# Patient Record
Sex: FEMALE | Race: WHITE | NOT HISPANIC OR LATINO | Employment: PART TIME | URBAN - METROPOLITAN AREA
[De-identification: names, ages, dates, MRNs, and addresses within clinical notes are randomized per-mention and may not be internally consistent; named-entity substitution may affect disease eponyms.]

---
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
## 2023-01-11 ENCOUNTER — VIRTUAL VISIT (OUTPATIENT)
Dept: BEHAVIORAL HEALTH | Facility: CLINIC | Age: 18
End: 2023-01-11
Payer: COMMERCIAL

## 2023-01-11 DIAGNOSIS — F41.9 ANXIETY DISORDER, UNSPECIFIED TYPE: Primary | ICD-10-CM

## 2023-01-11 DIAGNOSIS — F50.819 BINGE EATING DISORDER: ICD-10-CM

## 2023-01-11 DIAGNOSIS — F33.40 RECURRENT MAJOR DEPRESSIVE DISORDER, IN REMISSION (H): ICD-10-CM

## 2023-01-11 PROCEDURE — 90834 PSYTX W PT 45 MINUTES: CPT | Mod: GT | Performed by: SOCIAL WORKER

## 2023-01-11 NOTE — PROGRESS NOTES
[facility] Primary Care: Integrated Behavioral Health  January 11, 2023        Behavioral Health Clinician Progress Note    Patient Name: Mirna Carl           Service Type:  Individual      Service Location:   Face to Face in Clinic     Session Start Time: 12:31p  Session End Time: 1:15p      Session Length: 38 - 52      Attendees: Client     Service Modality:  Video Visit:      Provider verified identity through the following two step process.  Patient provided:  Patient is known previously to provider    Telemedicine Visit: The patient's condition can be safely assessed and treated via synchronous audio and visual telemedicine encounter.      Reason for Telemedicine Visit: Patient has requested telehealth visit    Originating Site (Patient Location): Patient's home    Distant Site (Provider Location): Provider Remote Setting- Home Office    Consent:  The patient/guardian has verbally consented to: the potential risks and benefits of telemedicine (video visit) versus in person care; bill my insurance or make self-payment for services provided; and responsibility for payment of non-covered services.     Patient would like the video invitation sent by:  My Chart    Mode of Communication:  Video Conference via [employer]well    Distant Location (Provider):  Off-site    As the provider I attest to compliance with applicable laws and regulations related to telemedicine.    Visit Activities (Refresh list every visit): [facility] Only    Diagnostic Assessment Date: 12/14/22  Treatment Plan Review Date: 4/11/23  See Flowsheets for today's PHQ-9 and MATT-7 results  Previous PHQ-9:   PHQ-9 SCORE 10/28/2022 11/29/2022 12/14/2022   PHQ-9 Total Score MyChart - 0 0   PHQ-9 Total Score 13 0 0     Previous MATT-7:   MATT-7 SCORE 10/28/2022 12/14/2022   Total Score - 0 (minimal anxiety)   Total Score 13 0       NIRAV LEVEL:  No flowsheet data found.    DATA  Extended Session (60+ minutes): No  Interactive  Complexity: No  Crisis: No  [facility] Patient: No    Treatment Objective(s) Addressed in This Session:  Target Behavior(s): anxiety    Anxiety: will experience a reduction in anxiety    Current Stressors / Issues:  Is on J term currently and starts her next semester in 2 weeks.    Is working quite a bit over break.  Finances are a bit tight with spring semester coming next month.  Has worked through a budget plan and is feeling better and has financial support from family if needed.  Had interview for the summer position and reports that it went really well although it came with some situational anxiety.   Likes being on vyvanse and feels it has been helpful with curbing her appetite.  Has been using the [employer] equipment after her work shifts as she prefers working out without so many people.          Progress on Treatment Objective(s) / Homework:  Stable - ACTION (Actively working towards change); Intervened by reinforcing change plan / affirming steps taken    Motivational Interviewing    MI Intervention: Expressed Empathy/Understanding, Permission to raise concern or advise, Open-ended questions and Reflections: simple and complex     Change Talk Expressed by the Patient: Desire to change Committment to change    Provider Response to Change Talk: E - Evoked more info from patient about behavior change, A - Affirmed patient's thoughts, decisions, or attempts at behavior change, R - Reflected patient's change talk and S - Summarized patient's change talk statements      Care Plan review completed: No    Medication Review:  Changes to psychiatric medications, see updated Medication List in EPIC.  Recently started back on vyvanse.     Medication Compliance:  Yes    Changes in Health Issues:   None reported    Chemical Use Review:   Substance Use: Chemical use reviewed, no active concerns identified      Tobacco Use: No current tobacco use.      Assessment: Current Emotional / Mental Status (status of significant  symptoms):  Risk status (Self / Other harm or suicidal ideation)  Patient denies a history of suicidal ideation, suicide attempts, self-injurious behavior, homicidal ideation, homicidal behavior and and other safety concerns  Patient denies current fears or concerns for personal safety.  Patient denies current or recent suicidal ideation or behaviors.  Patient denies current or recent homicidal ideation or behaviors.  Patient denies current or recent self injurious behavior or ideation.  Patient denies other safety concerns.  A safety and risk management plan has not been developed at this time, however patient was encouraged to call [place] [phone] should there be a change in any of these risk factors.    Appearance:   Appropriate   Eye Contact:   Good   Psychomotor Behavior: Normal   Attitude:   Cooperative   Orientation:   All  Speech   Rate / Production: Normal/ Responsive Normal    Volume:  Normal   Mood:    Normal  Affect:    Appropriate   Thought Content:  Clear   Thought Form:  Coherent  Logical   Insight:    Good     Diagnoses:  1. Anxiety disorder, unspecified type    2. Recurrent major depressive disorder, in remission (H)    3. Binge eating disorder        Collateral Reports Completed:  Not Applicable    Plan: (Homework, other):  Patient was given information about behavioral services and encouraged to schedule a follow up appointment with the clinic [facility] in 3 weeks.  She was also given information about mental health symptoms and treatment options .  CD Recommendations: No indications of CD issues.  Ligia Garcia, [facility], [facility]                                              Individual Treatment Plan    Patient's Name: Mirna Carl  YOB: 2005    Date of Creation: 1/11/23   Date Treatment Plan Last Reviewed/Revised: 1/11/23     DSM5 Diagnoses: 296.30 (F33.9) Major Depressive Disorder, Recurrent Episode, Unspecified _ and With anxious distress or 300.00 (F41.9) Unspecified Anxiety  Disorder  Psychosocial / Contextual Factors: school stress,  PROMIS (reviewed every 90 days): 40    Referral / Collaboration:  Referral to another professional/service is not indicated at this time..    Anticipated number of session for this episode of care: 6-9 sessions  Anticipation frequency of session: Monthly  Anticipated Duration of each session: 38-52 minutes  Treatment plan will be reviewed in 90 days or when goals have been changed.       MeasurableTreatment Goal(s) related to diagnosis / functional impairment(s)  Goal 1: Patient will experience a reduction in anxious symptoms, along with a corresponding increase in relaxed emotional state.    I will know I've met my goal when anxious worries/fears decrease.      Objective #A (Patient Action)    Patient will use cognitive strategies identified in therapy to challenge anxious thoughts.  Status: New - Date: 1/11/23     Intervention(s)  Therapist will utilize CBT and ACT to help pt challenge anxious thoughts and reduce intensity/duration of anxious distress.      Patient has reviewed and agreed to the above plan.        Ligia Garcia, ESVIN, LICSW, [facility]  January 11, 2023

## 2023-02-01 ENCOUNTER — VIRTUAL VISIT (OUTPATIENT)
Dept: BEHAVIORAL HEALTH | Facility: CLINIC | Age: 18
End: 2023-02-01
Payer: COMMERCIAL

## 2023-02-01 DIAGNOSIS — F33.40 RECURRENT MAJOR DEPRESSIVE DISORDER, IN REMISSION (H): ICD-10-CM

## 2023-02-01 DIAGNOSIS — F50.819 BINGE EATING DISORDER: ICD-10-CM

## 2023-02-01 DIAGNOSIS — F41.9 ANXIETY DISORDER, UNSPECIFIED TYPE: Primary | ICD-10-CM

## 2023-02-01 PROCEDURE — 90834 PSYTX W PT 45 MINUTES: CPT | Mod: GT | Performed by: SOCIAL WORKER

## 2023-02-01 ASSESSMENT — PATIENT HEALTH QUESTIONNAIRE - PHQ9
SUM OF ALL RESPONSES TO PHQ QUESTIONS 1-9: 0
10. IF YOU CHECKED OFF ANY PROBLEMS, HOW DIFFICULT HAVE THESE PROBLEMS MADE IT FOR YOU TO DO YOUR WORK, TAKE CARE OF THINGS AT HOME, OR GET ALONG WITH OTHER PEOPLE: NOT DIFFICULT AT ALL
SUM OF ALL RESPONSES TO PHQ QUESTIONS 1-9: 0

## 2023-02-01 NOTE — PROGRESS NOTES
[facility] Primary Care: Integrated Behavioral Health  February 1, 2023      Behavioral Health Clinician Progress Note    Patient Name: Mirna Carl           Service Type:  Individual      Service Location:   Face to Face in Clinic     Session Start Time: 12:31p  Session End Time: 1:15p      Session Length: 38 - 52      Attendees: Client     Service Modality:  Video Visit:      Provider verified identity through the following two step process.  Patient provided:  Patient is known previously to provider    Telemedicine Visit: The patient's condition can be safely assessed and treated via synchronous audio and visual telemedicine encounter.      Reason for Telemedicine Visit: Patient has requested telehealth visit    Originating Site (Patient Location): Patient's home    Distant Site (Provider Location): Provider Remote Setting- Home Office    Consent:  The patient/guardian has verbally consented to: the potential risks and benefits of telemedicine (video visit) versus in person care; bill my insurance or make self-payment for services provided; and responsibility for payment of non-covered services.     Patient would like the video invitation sent by:  My Chart    Mode of Communication:  Video Conference via Amwell    Distant Location (Provider):  Off-site    As the provider I attest to compliance with applicable laws and regulations related to telemedicine.    Visit Activities (Refresh list every visit): [facility] Only    Diagnostic Assessment Date: 12/14/22  Treatment Plan Review Date: 4/11/23  See Flowsheets for today's PHQ-9 and MATT-7 results  Previous PHQ-9:   PHQ-9 SCORE 11/29/2022 12/14/2022 2/1/2023   PHQ-9 Total Score MyChart 0 0 0   PHQ-9 Total Score 0 0 0     Previous MATT-7:   MATT-7 SCORE 10/28/2022 12/14/2022   Total Score - 0 (minimal anxiety)   Total Score 13 0       NIRAV LEVEL:  No flowsheet data found.    DATA  Extended Session (60+ minutes): No  Interactive Complexity:  No  Crisis: No  [facility] Patient: No    Treatment Objective(s) Addressed in This Session:  Target Behavior(s): anxiety    Anxiety: will experience a reduction in anxiety    Current Stressors / Issues:  Pt shares that 2nd semester of school started last week and is going well so far.    Is moving out of her parents house next week and will live at [facility] housing for [employer] employees. Pt reports that she is excited about moving out and that this will likely be temporary until Summer and will then decide if she wants to move back home or find other options.  Reports that she has been feeling ready to move out for a while.  Biggest stressor at home is that her father projects his anger/frustration towards family and pt feels especially on her.  Has been working out doing [employer] videos from home.  Feels good about her increased activity.        Progress on Treatment Objective(s) / Homework:  Stable - ACTION (Actively working towards change); Intervened by reinforcing change plan / affirming steps taken    Motivational Interviewing    MI Intervention: Expressed Empathy/Understanding, Permission to raise concern or advise, Open-ended questions and Reflections: simple and complex     Change Talk Expressed by the Patient: Desire to change Committment to change    Provider Response to Change Talk: E - Evoked more info from patient about behavior change, A - Affirmed patient's thoughts, decisions, or attempts at behavior change, R - Reflected patient's change talk and S - Summarized patient's change talk statements      Care Plan review completed: No    Medication Review:  Changes to psychiatric medications, see updated Medication List in EPIC.  Recently started back on vyvanse.     Medication Compliance:  Yes    Changes in Health Issues:   None reported    Chemical Use Review:   Substance Use: Chemical use reviewed, no active concerns identified      Tobacco Use: No current tobacco use.      Assessment: Current Emotional /  Mental Status (status of significant symptoms):  Risk status (Self / Other harm or suicidal ideation)  Patient denies a history of suicidal ideation, suicide attempts, self-injurious behavior, homicidal ideation, homicidal behavior and and other safety concerns  Patient denies current fears or concerns for personal safety.  Patient denies current or recent suicidal ideation or behaviors.  Patient denies current or recent homicidal ideation or behaviors.  Patient denies current or recent self injurious behavior or ideation.  Patient denies other safety concerns.  A safety and risk management plan has not been developed at this time, however patient was encouraged to call [place] [phone] should there be a change in any of these risk factors.    Appearance:   Appropriate   Eye Contact:   Good   Psychomotor Behavior: Normal   Attitude:   Cooperative   Orientation:   All  Speech   Rate / Production: Normal/ Responsive Normal    Volume:  Normal   Mood:    Normal  Affect:    Appropriate   Thought Content:  Clear   Thought Form:  Coherent  Logical   Insight:    Good     Diagnoses:  1. Anxiety disorder, unspecified type    2. Recurrent major depressive disorder, in remission (H)    3. Binge eating disorder        Collateral Reports Completed:  Not Applicable    Plan: (Homework, other):  Patient was given information about behavioral services and encouraged to schedule a follow up appointment with the clinic [facility] in 2 weeks.  She was also given information about mental health symptoms and treatment options .  CD Recommendations: No indications of CD issues.  KVNG Torres, [facility]                                              Individual Treatment Plan    Patient's Name: Mirna Carl  YOB: 2005    Date of Creation: 1/11/23   Date Treatment Plan Last Reviewed/Revised: 1/11/23     DSM5 Diagnoses: 296.30 (F33.9) Major Depressive Disorder, Recurrent Episode, Unspecified _ and With anxious distress  or 300.00 (F41.9) Unspecified Anxiety Disorder  Psychosocial / Contextual Factors: school stress, moving out  PROMIS (reviewed every 90 days): 40    Referral / Collaboration:  Referral to another professional/service is not indicated at this time.    Anticipated number of session for this episode of care: 6-9 sessions  Anticipation frequency of session: Monthly  Anticipated Duration of each session: 38-52 minutes  Treatment plan will be reviewed in 90 days or when goals have been changed.       MeasurableTreatment Goal(s) related to diagnosis / functional impairment(s)  Goal 1: Patient will experience a reduction in anxious symptoms, along with a corresponding increase in relaxed emotional state.    I will know I've met my goal when anxious worries/fears decrease.      Objective #A (Patient Action)    Patient will use cognitive strategies identified in therapy to challenge anxious thoughts.  Status: New - Date: 1/11/23     Intervention(s)  Therapist will utilize CBT and ACT to help pt challenge anxious thoughts and reduce intensity/duration of anxious distress.      Patient has reviewed and agreed to the above plan.        Ligia Garcia, ESVIN, LICSW, [facility], [place]  February 1, 2023

## 2023-02-10 ENCOUNTER — VIRTUAL VISIT (OUTPATIENT)
Dept: SURGERY | Facility: CLINIC | Age: 18
End: 2023-02-10
Payer: COMMERCIAL

## 2023-02-10 DIAGNOSIS — F50.819 BINGE EATING DISORDER: ICD-10-CM

## 2023-02-10 DIAGNOSIS — E66.01 CLASS 3 SEVERE OBESITY DUE TO EXCESS CALORIES WITHOUT SERIOUS COMORBIDITY WITH BODY MASS INDEX (BMI) OF 40.0 TO 44.9 IN ADULT (H): Primary | ICD-10-CM

## 2023-02-10 DIAGNOSIS — E66.813 CLASS 3 SEVERE OBESITY DUE TO EXCESS CALORIES WITHOUT SERIOUS COMORBIDITY WITH BODY MASS INDEX (BMI) OF 40.0 TO 44.9 IN ADULT (H): Primary | ICD-10-CM

## 2023-02-10 DIAGNOSIS — Z71.3 NUTRITIONAL COUNSELING: ICD-10-CM

## 2023-02-10 PROCEDURE — 97803 MED NUTRITION INDIV SUBSEQ: CPT | Mod: VID | Performed by: DIETITIAN, REGISTERED

## 2023-02-10 NOTE — PROGRESS NOTES
Mirna Carl is a 17 year old who is being evaluated via a billable video visit.      How would you like to obtain your AVS? MyChart  If the video visit is dropped, the invitation should be resent by: Send to e-mail at: [email or web address]  Will anyone else be joining your video visit? No        Medical  Weight Loss Follow-Up Diet Evaluation  Assessment:  Mirna is presenting today for a follow up weight management nutrition consultation. Pt has had an initial appointment with Dr. Crowe.  Weight loss medication: Vyvanse.   Pt's weight is 223 lbs  Initial weight: 236.6 lbs   Weight change: 13.6 lbs (down)    No flowsheet data found.  BMI: There is no height or weight on file to calculate BMI.  Patient weight not recorded    Estimated RMR (Oxford-St Jeor equation):   1725 kcals x 1.3 (light active) = 2243 kcals (for weight maintenance)     Recommended Protein Intake: 60-80 grams of protein/day  Patient Active Problem List:  Patient Active Problem List   Diagnosis     Anxiety disorder     Hashimoto's thyroiditis     Moderate episode of recurrent major depressive disorder (H)     Class 3 severe obesity due to excess calories without serious comorbidity with body mass index (BMI) of 40.0 to 44.9 in adult (H)     Seasonal allergic rhinitis     Binge eating disorder     Progress on goals from last visit: Has been doing more Dima Bars for a grab and go meal option.  Patient reports that she continues to work on incorporating the carrots and broccoli that is raw into her diet and feels that it is going well.     Dietary Recall:  Breakfast: [place] Oatmeal or Scrambled Eggs  Lunch:Dima Bar   Dinner:Chicken or Beef with with raw carrots or raw broccoli  Typical snacks: Kiwi Fruit  Beverages: water-feels that it is going well   Exercise: Pilates-most days of the week, Swimming 3 times/week    Nutrition Diagnosis:    Overweight/Obesity (NC 3.3) related to overeating and poor lifestyle habits as evidenced by  patient's subjective statements (h/o excessive energy intake) and BMI of 42.3 kg/m2.     Intervention:  1. Food and/or nutrient delivery: balanced meals, adequate protein  2. Nutrition education: none  3. Nutrition counseling: provided support and encouragement    Monitoring/Evaluation:    Goals:  1. Continue to focus on protein first at each meal, aiming for 60-80 grams of protein/day.   2. Continue to work on increased vegetable consumption at meal times.   3. Continue to exercise most days of the week.     Patient to follow up in 2 month(s) with [facility]    Video-Visit Details    Type of service:  Video Visit    Video Start Time (time video started): 1:28 pm    Video End Time (time video stopped): 1:36 pm    Originating Location (pt. Location): Home        Distant Location (provider location):  Off-site    Mode of Communication:  Video Conference via [facility]    Physician has received verbal consent for a Video Visit from the patient? Yes      Lucero Cannon RD

## 2023-02-10 NOTE — LETTER
2/10/2023         RE: Mirna Carl  [address] [address]        Dear Colleague,    Thank you for referring your patient, Mirna Carl, to the [facility] [place]. Please see a copy of my visit note below.    Mirna Carl is a 17 year old who is being evaluated via a billable video visit.      How would you like to obtain your AVS? MyChart  If the video visit is dropped, the invitation should be resent by: Send to e-mail at: [email or web address]  Will anyone else be joining your video visit? No        Medical  Weight Loss Follow-Up Diet Evaluation  Assessment:  Mirna is presenting today for a follow up weight management nutrition consultation. Pt has had an initial appointment with Dr. Crowe.  Weight loss medication: Vyvanse.   Pt's weight is 223 lbs  Initial weight: 236.6 lbs   Weight change: 13.6 lbs (down)    No flowsheet data found.  BMI: There is no height or weight on file to calculate BMI.  Patient weight not recorded    Estimated RMR ([place]-St Jeor equation):   1725 kcals x 1.3 (light active) = 2243 kcals (for weight maintenance)     Recommended Protein Intake: 60-80 grams of protein/day  Patient Active Problem List:  Patient Active Problem List   Diagnosis     Anxiety disorder     Hashimoto's thyroiditis     Moderate episode of recurrent major depressive disorder (H)     Class 3 severe obesity due to excess calories without serious comorbidity with body mass index (BMI) of 40.0 to 44.9 in adult (H)     Seasonal allergic rhinitis     Binge eating disorder     Progress on goals from last visit: Has been doing more Dima Bars for a grab and go meal option.  Patient reports that she continues to work on incorporating the carrots and broccoli that is raw into her diet and feels that it is going well.     Dietary Recall:  Breakfast: [place] Oatmeal or Scrambled Eggs  Lunch:Dima Bar   Dinner:Chicken or Beef with  Documentation was been inputted in patient's chart.     with raw carrots or raw broccoli  Typical snacks: Kiwi Fruit  Beverages: water-feels that it is going well   Exercise: Pilates-most days of the week, Swimming 3 times/week    Nutrition Diagnosis:    Overweight/Obesity (NC 3.3) related to overeating and poor lifestyle habits as evidenced by patient's subjective statements (h/o excessive energy intake) and BMI of 42.3 kg/m2.     Intervention:  1. Food and/or nutrient delivery: balanced meals, adequate protein  2. Nutrition education: none  3. Nutrition counseling: provided support and encouragement    Monitoring/Evaluation:    Goals:  1. Continue to focus on protein first at each meal, aiming for 60-80 grams of protein/day.   2. Continue to work on increased vegetable consumption at meal times.   3. Continue to exercise most days of the week.     Patient to follow up in 2 month(s) with [facility]O    Video-Visit Details    Type of service:  Video Visit    Video Start Time (time video started): 1:28 pm    Video End Time (time video stopped): 1:36 pm    Originating Location (pt. Location): Home        Distant Location (provider location):  Off-site    Mode of Communication:  Video Conference via [facility]    Physician has received verbal consent for a Video Visit from the patient? Yes      Lucero Cannon RD            Again, thank you for allowing me to participate in the care of your patient.        Sincerely,        Lucero Cannon RD

## 2023-02-14 ENCOUNTER — VIRTUAL VISIT (OUTPATIENT)
Dept: BEHAVIORAL HEALTH | Facility: CLINIC | Age: 18
End: 2023-02-14
Payer: COMMERCIAL

## 2023-02-14 DIAGNOSIS — F41.9 ANXIETY DISORDER, UNSPECIFIED TYPE: Primary | ICD-10-CM

## 2023-02-14 DIAGNOSIS — F33.40 RECURRENT MAJOR DEPRESSIVE DISORDER, IN REMISSION (H): ICD-10-CM

## 2023-02-14 PROCEDURE — 90832 PSYTX W PT 30 MINUTES: CPT | Mod: VID | Performed by: SOCIAL WORKER

## 2023-02-14 ASSESSMENT — ENCOUNTER SYMPTOMS
PARESTHESIAS: 0
CONSTIPATION: 0
SORE THROAT: 0
ARTHRALGIAS: 0
EYE PAIN: 0
HEADACHES: 0
DYSURIA: 0
COUGH: 0
FREQUENCY: 0
HEARTBURN: 0
NERVOUS/ANXIOUS: 0
MYALGIAS: 0
PALPITATIONS: 0
NAUSEA: 0
ABDOMINAL PAIN: 0
FEVER: 0
DIARRHEA: 0
HEMATOCHEZIA: 0
HEMATURIA: 0
BREAST MASS: 0
JOINT SWELLING: 0
SHORTNESS OF BREATH: 0
CHILLS: 0
WEAKNESS: 0
DIZZINESS: 0

## 2023-02-14 NOTE — PROGRESS NOTES
[facility] Primary Care: Integrated Behavioral Health  February 14, 2023      Behavioral Health Clinician Progress Note    Patient Name: Mirna Carl           Service Type:  Individual      Service Location:   [facility] / Email (patient reached)     Session Start Time: 1:00p  Session End Time: 1:34p      Session Length: 16 - 37      Attendees: Client     Service Modality:  Video Visit:      Provider verified identity through the following two step process.  Patient provided:  Patient is known previously to provider    Telemedicine Visit: The patient's condition can be safely assessed and treated via synchronous audio and visual telemedicine encounter.      Reason for Telemedicine Visit: Patient has requested telehealth visit    Originating Site (Patient Location): Patient's home    Distant Site (Provider Location): Provider Remote Setting- Home Office    Consent:  The patient/guardian has verbally consented to: the potential risks and benefits of telemedicine (video visit) versus in person care; bill my insurance or make self-payment for services provided; and responsibility for payment of non-covered services.     Patient would like the video invitation sent by:  My Chart    Mode of Communication:  Video Conference via [facility]    Distant Location (Provider):  On-site    As the provider I attest to compliance with applicable laws and regulations related to telemedicine.    Visit Activities (Refresh list every visit): [facility] Only    Diagnostic Assessment Date: 12/14/22  Treatment Plan Review Date: 4/11/23  See Flowsheets for today's PHQ-9 and MATT-7 results  Previous PHQ-9:   PHQ-9 SCORE 11/29/2022 12/14/2022 2/1/2023   PHQ-9 Total Score MyChart 0 0 0   PHQ-9 Total Score 0 0 0     Previous MATT-7:   MATT-7 SCORE 10/28/2022 12/14/2022   Total Score - 0 (minimal anxiety)   Total Score 13 0       NIRAV LEVEL:  No flowsheet data found.    DATA  Extended Session (60+ minutes): No  Interactive  Complexity: No  Crisis: No  [facility] Patient: No    Treatment Objective(s) Addressed in This Session:  Target Behavior(s): anxiety    Anxiety: will experience a reduction in anxiety    Current Stressors / Issues:  Pt shares that she has been living with roommates.  Currently doesn't have WIFI at her place but they are working on getting it set up. For her on-line classes has been coming to her parents house to use WIFI or is going the [employer].    Finds self getting more comfortable   Meets with a dietician regularly to discuss her diet and some food issues she has.    Needing to get caught up in school with the move.  A little behind in reading for some classes, isn't worried about getting caught up. Feels she has a good organization system in place.    Had some discussions about a class she is really enjoying.       Progress on Treatment Objective(s) / Homework:  Stable - ACTION (Actively working towards change); Intervened by reinforcing change plan / affirming steps taken    Motivational Interviewing    MI Intervention: Expressed Empathy/Understanding, Permission to raise concern or advise, Open-ended questions and Reflections: simple and complex     Change Talk Expressed by the Patient: Desire to change Committment to change    Provider Response to Change Talk: E - Evoked more info from patient about behavior change, A - Affirmed patient's thoughts, decisions, or attempts at behavior change, R - Reflected patient's change talk and S - Summarized patient's change talk statements      Care Plan review completed: No    Medication Review:  Changes to psychiatric medications, see updated Medication List in EPIC.      Medication Compliance:  Yes    Changes in Health Issues:   None reported    Chemical Use Review:   Substance Use: Chemical use reviewed, no active concerns identified      Tobacco Use: No current tobacco use.      Assessment: Current Emotional / Mental Status (status of significant  symptoms):  Risk status (Self / Other harm or suicidal ideation)  Patient denies a history of suicidal ideation, suicide attempts, self-injurious behavior, homicidal ideation, homicidal behavior and and other safety concerns  Patient denies current fears or concerns for personal safety.  Patient denies current or recent suicidal ideation or behaviors.  Patient denies current or recent homicidal ideation or behaviors.  Patient denies current or recent self injurious behavior or ideation.  Patient denies other safety concerns.  A safety and risk management plan has not been developed at this time, however patient was encouraged to call [place] [phone] should there be a change in any of these risk factors.    Appearance:   Appropriate   Eye Contact:   Good   Psychomotor Behavior: Normal   Attitude:   Cooperative   Orientation:   All  Speech   Rate / Production: Normal/ Responsive Normal    Volume:  Normal   Mood:    Normal  Affect:    Appropriate   Thought Content:  Clear   Thought Form:  Coherent  Logical   Insight:    Good     Diagnoses:  1. Anxiety disorder, unspecified type    2. Recurrent major depressive disorder, in remission (H)        Collateral Reports Completed:  Not Applicable    Plan: (Homework, other):  Patient was given information about behavioral services and encouraged to schedule a follow up appointment with the clinic [facility] [place] in 3 weeks.  She was also given information about mental health symptoms and treatment options .  CD Recommendations: No indications of CD issues.  Ligia Garcia, [facility], [facility] [place]                                              Individual Treatment Plan    Patient's Name: Mirna Carl  YOB: 2005    Date of Creation: 1/11/23   Date Treatment Plan Last Reviewed/Revised: 1/11/23     DSM5 Diagnoses: 296.30 (F33.9) Major Depressive Disorder, Recurrent Episode, Unspecified _ and With anxious distress or 300.00 (F41.9) Unspecified Anxiety Disorder  Psychosocial /  Contextual Factors: school stress, moving out  PROMIS (reviewed every 90 days): 40    Referral / Collaboration:  Referral to another professional/service is not indicated at this time.    Anticipated number of session for this episode of care: 6-9 sessions  Anticipation frequency of session: Monthly  Anticipated Duration of each session: 38-52 minutes  Treatment plan will be reviewed in 90 days or when goals have been changed.       MeasurableTreatment Goal(s) related to diagnosis / functional impairment(s)  Goal 1: Patient will experience a reduction in anxious symptoms, along with a corresponding increase in relaxed emotional state.    I will know I've met my goal when anxious worries/fears decrease.      Objective #A (Patient Action)    Patient will use cognitive strategies identified in therapy to challenge anxious thoughts.  Status: New - Date: 1/11/23     Intervention(s)  Therapist will utilize CBT and ACT to help pt challenge anxious thoughts and reduce intensity/duration of anxious distress.      Patient has reviewed and agreed to the above plan.        Ligia Garcia, ESVIN, LICSW, [facility]  February 14, 2023

## 2023-02-21 ENCOUNTER — OFFICE VISIT (OUTPATIENT)
Dept: FAMILY MEDICINE | Facility: CLINIC | Age: 18
End: 2023-02-21
Payer: COMMERCIAL

## 2023-02-21 VITALS
RESPIRATION RATE: 16 BRPM | SYSTOLIC BLOOD PRESSURE: 104 MMHG | HEART RATE: 83 BPM | DIASTOLIC BLOOD PRESSURE: 70 MMHG | OXYGEN SATURATION: 98 % | HEIGHT: 62 IN | WEIGHT: 217 LBS | BODY MASS INDEX: 39.93 KG/M2

## 2023-02-21 DIAGNOSIS — Z00.00 ROUTINE GENERAL MEDICAL EXAMINATION AT A HEALTH CARE FACILITY: ICD-10-CM

## 2023-02-21 DIAGNOSIS — Z11.3 SCREENING FOR STDS (SEXUALLY TRANSMITTED DISEASES): ICD-10-CM

## 2023-02-21 DIAGNOSIS — Z13.1 SCREENING FOR DIABETES MELLITUS: ICD-10-CM

## 2023-02-21 DIAGNOSIS — F50.819 BINGE EATING DISORDER: ICD-10-CM

## 2023-02-21 DIAGNOSIS — F41.1 GENERALIZED ANXIETY DISORDER: ICD-10-CM

## 2023-02-21 DIAGNOSIS — Z11.4 SCREENING FOR HIV (HUMAN IMMUNODEFICIENCY VIRUS): ICD-10-CM

## 2023-02-21 DIAGNOSIS — F90.0 ATTENTION DEFICIT HYPERACTIVITY DISORDER (ADHD), PREDOMINANTLY INATTENTIVE TYPE: ICD-10-CM

## 2023-02-21 DIAGNOSIS — Z11.59 NEED FOR HEPATITIS C SCREENING TEST: ICD-10-CM

## 2023-02-21 DIAGNOSIS — F33.1 MODERATE EPISODE OF RECURRENT MAJOR DEPRESSIVE DISORDER (H): ICD-10-CM

## 2023-02-21 DIAGNOSIS — E06.3 HASHIMOTO'S THYROIDITIS: ICD-10-CM

## 2023-02-21 DIAGNOSIS — E78.5 HYPERLIPIDEMIA LDL GOAL <100: Primary | ICD-10-CM

## 2023-02-21 PROBLEM — E66.813 CLASS 3 SEVERE OBESITY DUE TO EXCESS CALORIES WITHOUT SERIOUS COMORBIDITY WITH BODY MASS INDEX (BMI) OF 40.0 TO 44.9 IN ADULT (H): Status: RESOLVED | Noted: 2022-03-28 | Resolved: 2023-02-21

## 2023-02-21 PROBLEM — E66.01 CLASS 3 SEVERE OBESITY DUE TO EXCESS CALORIES WITHOUT SERIOUS COMORBIDITY WITH BODY MASS INDEX (BMI) OF 40.0 TO 44.9 IN ADULT (H): Status: RESOLVED | Noted: 2022-03-28 | Resolved: 2023-02-21

## 2023-02-21 LAB
CHOLEST SERPL-MCNC: 325 MG/DL
HBA1C MFR BLD: 5.2 % (ref 0–5.6)
HDLC SERPL-MCNC: 43 MG/DL
LDLC SERPL CALC-MCNC: 269 MG/DL
NONHDLC SERPL-MCNC: 282 MG/DL
TRIGL SERPL-MCNC: 65 MG/DL
TSH SERPL DL<=0.005 MIU/L-ACNC: 1.45 UIU/ML (ref 0.5–4.3)

## 2023-02-21 PROCEDURE — 83036 HEMOGLOBIN GLYCOSYLATED A1C: CPT | Performed by: FAMILY MEDICINE

## 2023-02-21 PROCEDURE — 87591 N.GONORRHOEAE DNA AMP PROB: CPT | Performed by: FAMILY MEDICINE

## 2023-02-21 PROCEDURE — 87389 HIV-1 AG W/HIV-1&-2 AB AG IA: CPT | Performed by: FAMILY MEDICINE

## 2023-02-21 PROCEDURE — 99214 OFFICE O/P EST MOD 30 MIN: CPT | Mod: 25 | Performed by: FAMILY MEDICINE

## 2023-02-21 PROCEDURE — 86800 THYROGLOBULIN ANTIBODY: CPT | Performed by: FAMILY MEDICINE

## 2023-02-21 PROCEDURE — 84432 ASSAY OF THYROGLOBULIN: CPT | Mod: 59 | Performed by: FAMILY MEDICINE

## 2023-02-21 PROCEDURE — 86376 MICROSOMAL ANTIBODY EACH: CPT | Performed by: FAMILY MEDICINE

## 2023-02-21 PROCEDURE — 84443 ASSAY THYROID STIM HORMONE: CPT | Performed by: FAMILY MEDICINE

## 2023-02-21 PROCEDURE — 99395 PREV VISIT EST AGE 18-39: CPT | Performed by: FAMILY MEDICINE

## 2023-02-21 PROCEDURE — 86803 HEPATITIS C AB TEST: CPT | Performed by: FAMILY MEDICINE

## 2023-02-21 PROCEDURE — 80061 LIPID PANEL: CPT | Performed by: FAMILY MEDICINE

## 2023-02-21 PROCEDURE — 87491 CHLMYD TRACH DNA AMP PROBE: CPT | Performed by: FAMILY MEDICINE

## 2023-02-21 PROCEDURE — 36415 COLL VENOUS BLD VENIPUNCTURE: CPT | Performed by: FAMILY MEDICINE

## 2023-02-21 RX ORDER — FLUOXETINE 20 MG/1
40 TABLET, FILM COATED ORAL DAILY
Qty: 180 TABLET | Refills: 1 | Status: SHIPPED | OUTPATIENT
Start: 2023-02-21 | End: 2023-04-17

## 2023-02-21 RX ORDER — LISDEXAMFETAMINE DIMESYLATE 40 MG/1
40 CAPSULE ORAL EVERY MORNING
Qty: 90 CAPSULE | Refills: 0 | Status: SHIPPED | OUTPATIENT
Start: 2023-02-21 | End: 2023-03-17

## 2023-02-21 ASSESSMENT — ENCOUNTER SYMPTOMS
FEVER: 0
DIARRHEA: 0
HEARTBURN: 0
WEAKNESS: 0
JOINT SWELLING: 0
NAUSEA: 0
PALPITATIONS: 0
CHILLS: 0
FREQUENCY: 0
ARTHRALGIAS: 0
COUGH: 0
HEMATOCHEZIA: 0
DIZZINESS: 0
SORE THROAT: 0
HEMATURIA: 0
EYE PAIN: 0
BREAST MASS: 0
ABDOMINAL PAIN: 0
PARESTHESIAS: 0
SHORTNESS OF BREATH: 0
NERVOUS/ANXIOUS: 0
DYSURIA: 0
CONSTIPATION: 0
HEADACHES: 0
MYALGIAS: 0

## 2023-02-21 NOTE — LETTER
My Depression Action Plan  Name: Mirna Carl   Date of Birth 2005  Date: 2/21/2023    My doctor: Kaylynn Ferraro   My clinic: [address] [address] [phone]          GREEN    ZONE   Good Control    What it looks like:     Things are going generally well. You have normal ups and downs. You may even feel depressed from time to time, but bad moods usually last less than a day.   What you need to do:  1. Continue to care for yourself (see self care plan)  2. Check your depression survival kit and update it as needed  3. Follow your physician s recommendations including any medication.  4. Do not stop taking medication unless you consult with your physician first.           YELLOW         ZONE Getting Worse    What it looks like:     Depression is starting to interfere with your life.     It may be hard to get out of bed; you may be starting to isolate yourself from others.    Symptoms of depression are starting to last most all day and this has happened for several days.     You may have suicidal thoughts but they are not constant.   What you need to do:     1. Call your care team. Your response to treatment will improve if you keep your care team informed of your progress. Yellow periods are signs an adjustment may need to be made.     2. Continue your self-care.  Just get dressed and ready for the day.  Don't give yourself time to talk yourself out of it.    3. Talk to someone in your support network.    4. Open up your Depression Self-Care Plan/Wellness Kit.           RED    ZONE Medical Alert - Get Help    What it looks like:     Depression is seriously interfering with your life.     You may experience these or other symptoms: You can t get out of bed most days, can t work or engage in other necessary activities, you have trouble taking care of basic hygiene, or basic responsibilities, thoughts of suicide or death  that will not go away, self-injurious behavior.     What you need to do:  1. Call your care team and request a same-day appointment. If they are not available (weekends or after hours) call your local crisis line, emergency room or 911.          Depression Self-Care Plan / Wellness Kit    Many people find that medication and therapy are helpful treatments for managing depression. In addition, making small changes to your everyday life can help to boost your mood and improve your wellbeing. Below are some tips for you to consider. Be sure to talk with your medical provider and/or behavioral health consultant if your symptoms are worsening or not improving.     Sleep   Sleep hygiene  means all of the habits that support good, restful sleep. It includes maintaining a consistent bedtime and wake time, using your bedroom only for sleeping or sex, and keeping the bedroom dark and free of distractions like a computer, smartphone, or television.     Develop a Healthy Routine  Maintain good hygiene. Get out of bed in the morning, make your bed, brush your teeth, take a shower, and get dressed. Don t spend too much time viewing media that makes you feel stressed. Find time to relax each day.    Exercise  Get some form of exercise every day. This will help reduce pain and release endorphins, the  feel good  chemicals in your brain. It can be as simple as just going for a walk or doing some gardening, anything that will get you moving.      Diet  Strive to eat healthy foods, including fruits and vegetables. Drink plenty of water. Avoid excessive sugar, caffeine, alcohol, and other mood-altering substances.     Stay Connected with Others  Stay in touch with friends and family members.    Manage Your Mood  Try deep breathing, massage therapy, biofeedback, or meditation. Take part in fun activities when you can. Try to find something to smile about each day.     Psychotherapy  Be open to working with a therapist if your provider  recommends it.     Medication  Be sure to take your medication as prescribed. Most anti-depressants need to be taken every day. It usually takes several weeks for medications to work. Not all medicines work for all people. It is important to follow-up with your provider to make sure you have a treatment plan that is working for you. Do not stop your medication abruptly without first discussing it with your provider.    Crisis Resources   These hotlines are for both adults and children. They and are open 24 hours a day, 7 days a week unless noted otherwise.      National Suicide Prevention Lifeline   988 or 4-003-339-AYJW (4908)      Crisis Text Line    www.crisistextline.org  Text HOME to 413461 from anywhere in the [place], anytime, about any type of crisis. A live, trained crisis counselor will receive the text and respond quickly.      [place] Lifeline for LGBTQ Youth  A national crisis intervention and suicide lifeline for LGBTQ youth under 25. Provides a safe place to talk without judgement. Call 1-985.486.6023; text START to 602844 or visit www.thetrevorproject.org to talk to a trained counselor.      For [facility] crisis numbers, visit the [facility] website at:  https://[place].gov/dhs/people-we-serve/adults/health-care/mental-health/resources/crisis-contacts.jsp

## 2023-02-21 NOTE — PROGRESS NOTES
SUBJECTIVE:   CC: Paolo is an 17 year old who presents for preventive health visit.     Patient has been advised of split billing requirements and indicates understanding: Yes  Healthy Habits:     Getting at least 3 servings of Calcium per day:  Yes    Bi-annual eye exam:  Yes    Dental care twice a year:  Yes    Sleep apnea or symptoms of sleep apnea:  None    Diet:  Regular (no restrictions)    Frequency of exercise:  4-5 days/week    Duration of exercise:  30-45 minutes    Taking medications regularly:  Yes    Medication side effects:  None    PHQ-2 Total Score: 0    Additional concerns today:  No              Today's PHQ-2 Score:   PHQ-2 ( 1999 Pfizer) 2/14/2023   Q1: Little interest or pleasure in doing things 0   Q2: Feeling down, depressed or hopeless 0   PHQ-2 Score 0   Q1: Little interest or pleasure in doing things Not at all   Q2: Feeling down, depressed or hopeless Not at all   PHQ-2 Score 0           Social History     Tobacco Use     Smoking status: Never     Smokeless tobacco: Never   Substance Use Topics     Alcohol use: Never     If you drink alcohol do you typically have >3 drinks per day or >7 drinks per week? No    Alcohol Use 2/21/2023   Prescreen: >3 drinks/day or >7 drinks/week? -   Prescreen: >3 drinks/day or >7 drinks/week? No       Reviewed orders with patient.  Reviewed health maintenance and updated orders accordingly - Yes      Breast Cancer Screening:        History of abnormal Pap smear: NO - under age 21, PAP not appropriate for age     Reviewed and updated as needed this visit by clinical staff   Tobacco  Allergies  Meds              Reviewed and updated as needed this visit by Provider                 Past Medical History:   Diagnosis Date     Anxiety disorder 03/28/2022     Depressive disorder      Hashimoto's thyroiditis 03/28/2022     Moderate episode of recurrent major depressive disorder (H) 03/28/2022     Seasonal allergic rhinitis 03/28/2022     Uncomplicated asthma      "  No past surgical history on file.    Review of Systems   Constitutional: Negative for chills and fever.   HENT: Negative for congestion, ear pain, hearing loss and sore throat.    Eyes: Negative for pain and visual disturbance.   Respiratory: Negative for cough and shortness of breath.    Cardiovascular: Negative for chest pain, palpitations and peripheral edema.   Gastrointestinal: Negative for abdominal pain, constipation, diarrhea, heartburn, hematochezia and nausea.   Breasts:  Negative for tenderness, breast mass and discharge.   Genitourinary: Negative for dysuria, frequency, genital sores, hematuria, pelvic pain, urgency, vaginal bleeding and vaginal discharge.   Musculoskeletal: Negative for arthralgias, joint swelling and myalgias.   Skin: Negative for rash.   Neurological: Negative for dizziness, weakness, headaches and paresthesias.   Psychiatric/Behavioral: Negative for mood changes. The patient is not nervous/anxious.           OBJECTIVE:   /70 (BP Location: Right arm, Patient Position: Sitting)   Pulse 83   Resp 16   Ht 1.575 m (5' 2\")   Wt 98.4 kg (217 lb)   LMP 02/21/2023 (Exact Date)   SpO2 98%   BMI 39.69 kg/m    Physical Exam    General: alert and oriented ×3 no acute distress.    HEENT: pupils are equal round and reactive to light extraocular motion is intact. Normocephalic and atraumatic.     Hearing is grossly normal and there is no otorrhea.     Chest: has bilateral rise with no increased work of breathing.    Cardiovascular: normal perfusion and brisk capillary refill.    Musculoskeletal: no gross focal abnormalities and normal gait.    Neuro: no gross focal abnormalities and memory seems intact.    Psychiatric: speech is clear and coherent and fluent. Patient dressed appropriately for the weather. Mood is appropriate and affect is full.        ASSESSMENT/PLAN:       ICD-10-CM    1. Hyperlipidemia LDL goal <100  E78.5 Lipid panel reflex to direct LDL Fasting      2. Screening " for STDs (sexually transmitted diseases)  Z11.3 NEISSERIA GONORRHOEA PCR     CHLAMYDIA TRACHOMATIS PCR      3. Screening for HIV (human immunodeficiency virus)  Z11.4 HIV Antigen Antibody Combo      4. Need for hepatitis C screening test  Z11.59 Hepatitis C Screen Reflex to HCV RNA Quant and Genotype      5. Screening for diabetes mellitus  Z13.1 Hemoglobin A1c      6. Hashimoto's thyroiditis  E06.3 TSH with free T4 reflex     Thyroglobulin and Antibody Reflex     Thyroid peroxidase antibody      7. Routine general medical examination at a health care facility  Z00.00       8. Moderate episode of recurrent major depressive disorder (H)  F33.1 FLUoxetine 20 MG tablet      9. Generalized anxiety disorder  F41.1 FLUoxetine 20 MG tablet        Patient has had major depression and generalized anxiety disorder that seem to be in remission at this time.  She has never had an opportunity to come off of her fluoxetine 40 mg daily to see if she can have success with a lower dose or coming off of the medication.  She is going to plan to finish up her 40 mg capsules and then switch to 220 mg tablets.  She can then try 20 mg and if she finds that this works as well for her that is great we can update her med list if she finds that she needs to go back up to the 40 mg that is okay to she will have enough medications.  We will plan to follow-up with her in 3 months so that we can make sure that her mood is still well controlled.    Hashimoto's patient has an appointment coming up with endocrinology.  Will order some labs now so that they can review these.    Hyperlipidemia with a family history of hyperlipidemia patient can find out from her mom if she is gotten any coronary artery disease if so then patient may have familial hyperlipidemia.  In the meantime she has been working on therapeutic lifestyle changes and we will get an updated lipid panel today.    ADHD and binge eating disorder patient has been on Vyvanse 40 mg daily.   "She feels this has been an effective dose.  She would like to change her prescriber to our office because of the difficulty getting over to [place] for follow-up.  No red flags found on PDMP.  Looks like she was able to get a 90-day supply filled so we will send a prescription for 90 days.  She will let me know if she is only able to  a partial refill.             COUNSELING:  Reviewed preventive health counseling, as reflected in patient instructions      BMI:   Estimated body mass index is 39.69 kg/m  as calculated from the following:    Height as of this encounter: 1.575 m (5' 2\").    Weight as of this encounter: 98.4 kg (217 lb).   Weight management plan: Being treated for binge eating disorder and her thyroid.      She reports that she has never smoked. She has never used smokeless tobacco.          Tanya Barrios MD  [facility]  "

## 2023-02-21 NOTE — PATIENT INSTRUCTIONS
Overcoming Binge Eating   Preventive Health Recommendations  Female Ages 18 to 20     Yearly exam:     See your health care provider every year in order to  o Review health changes.   o Discuss preventive care.    o Review your medicines if your doctor has prescribed any.      You should be tested each year for STDs (sexually transmitted diseases).       After age 20, talk to your provider about how often you should have cholesterol testing.      If you are at risk for diabetes, you should have a diabetes test (fasting glucose).     Shots:     Get a flu shot each year.     Get a tetanus shot every 10 years.     Consider getting the shot (vaccine) that prevents cervical cancer (Gardasil).    Nutrition:     Eat at least 5 servings of fruits and vegetables each day.    Eat whole-grain bread, whole-wheat pasta and brown rice instead of white grains and rice.    Get adequate Calcium and Vitamin D.     Lifestyle    Exercise at least 150 minutes a week each week (30 minutes a day, 5 days a week). This will help you control your weight and prevent disease.    No smoking.     Wear sunscreen to prevent skin cancer.    See your dentist every six months for an exam and cleaning.

## 2023-02-22 LAB
C TRACH DNA SPEC QL NAA+PROBE: NEGATIVE
HCV AB SERPL QL IA: NONREACTIVE
HIV 1+2 AB+HIV1 P24 AG SERPL QL IA: NONREACTIVE
N GONORRHOEA DNA SPEC QL NAA+PROBE: NEGATIVE
THYROGLOB AB SERPL IA-ACNC: <20 IU/ML
THYROPEROXIDASE AB SERPL-ACNC: 27 IU/ML

## 2023-02-23 LAB — THYROGLOB SERPL-MCNC: 59 NG/ML

## 2023-03-02 DIAGNOSIS — J30.2 SEASONAL ALLERGIC RHINITIS, UNSPECIFIED TRIGGER: Primary | ICD-10-CM

## 2023-03-06 RX ORDER — CETIRIZINE HYDROCHLORIDE 10 MG/1
10 TABLET ORAL DAILY
Qty: 90 TABLET | Refills: 0 | Status: SHIPPED | OUTPATIENT
Start: 2023-03-06 | End: 2023-05-30

## 2023-03-06 NOTE — TELEPHONE ENCOUNTER
"    Last office visit: 2/21/2023     Future Appointments 3/6/2023 - 9/2/2023      Date Visit Type Length Department Provider     3/7/2023 12:30 PM C RETURN 60 min [facility] Ligia Garcia MSW    Location Instructions:     [address] [place] [id number]              3/17/2023 11:30 AM RETURN ENDOCRINE 30 min [facility] Kay Crawley MD    Location Instructions:     We are located in the [facility] at [address] [place] [address], [address].&nbsp; Our building is located across the street from [facility] and offers free parking in our on-site lot. Please take the stairs or elevator to the second floor of the [facility] and check in at the  located in the Specialty Clinic, [address].              4/7/2023  2:00 PM VIDEO VISIT RETURN 30 min [facility] Luc[address]    Location Instructions:     Our clinic is located in the [facility] located at [address] [place] [address], [address], [place], across the street from [facility].                   Requested Prescriptions   Pending Prescriptions Disp Refills     cetirizine (ZYRTEC) 10 MG tablet 90 tablet 0     Sig: Take 1 tablet (10 mg) by mouth daily       Antihistamines Protocol Passed - 3/6/2023 11:36 AM        Passed - Patient is 3-64 years of age     Apply weight-based dosing for peds patients age 3 - 12 years of age.    Forward request to provider for patients under the age of 3 or over the age of 64.          Passed - Recent (12 mo) or future (30 days) visit within the authorizing provider's specialty     Patient has had an office visit with the authorizing provider or a provider within the authorizing providers department within the previous 12 mos or has a future within next 30 days. See \"Patient Info\" tab in inbasket, or \"Choose Columns\" in Meds & Orders section of the " refill encounter.              Passed - Medication is active on med list

## 2023-03-06 NOTE — TELEPHONE ENCOUNTER
3-6-23    General Call    Reason for Call:  Checking status on meds    What are your questions or concerns:   Pt called checking status on meds, pt states shes out  andreas

## 2023-03-07 ENCOUNTER — VIRTUAL VISIT (OUTPATIENT)
Dept: BEHAVIORAL HEALTH | Facility: CLINIC | Age: 18
End: 2023-03-07
Payer: COMMERCIAL

## 2023-03-07 DIAGNOSIS — F41.9 ANXIETY DISORDER, UNSPECIFIED TYPE: Primary | ICD-10-CM

## 2023-03-07 DIAGNOSIS — F50.819 BINGE EATING DISORDER: ICD-10-CM

## 2023-03-07 DIAGNOSIS — F33.40 RECURRENT MAJOR DEPRESSIVE DISORDER, IN REMISSION (H): ICD-10-CM

## 2023-03-07 PROCEDURE — 90834 PSYTX W PT 45 MINUTES: CPT | Mod: VID | Performed by: SOCIAL WORKER

## 2023-03-07 ASSESSMENT — PATIENT HEALTH QUESTIONNAIRE - PHQ9
10. IF YOU CHECKED OFF ANY PROBLEMS, HOW DIFFICULT HAVE THESE PROBLEMS MADE IT FOR YOU TO DO YOUR WORK, TAKE CARE OF THINGS AT HOME, OR GET ALONG WITH OTHER PEOPLE: NOT DIFFICULT AT ALL
SUM OF ALL RESPONSES TO PHQ QUESTIONS 1-9: 0
SUM OF ALL RESPONSES TO PHQ QUESTIONS 1-9: 0

## 2023-03-07 NOTE — PROGRESS NOTES
[facility]: Integrated Behavioral Health  March 7, 2023        Behavioral Health Clinician Progress Note    Patient Name: Mirna Carl           Service Type:  Individual      Service Location:   [facility] / Email (patient reached)     Session Start Time: 12:30p  Session End Time: 1:17p      Session Length: 38 - 52      Attendees: Client     Service Modality:  Video Visit:      Provider verified identity through the following two step process.  Patient provided:  Patient is known previously to provider    Telemedicine Visit: The patient's condition can be safely assessed and treated via synchronous audio and visual telemedicine encounter.      Reason for Telemedicine Visit: Patient has requested telehealth visit    Originating Site (Patient Location): Patient's home    Distant Site (Provider Location): Provider Remote Setting- Home Office    Consent:  The patient/guardian has verbally consented to: the potential risks and benefits of telemedicine (video visit) versus in person care; bill my insurance or make self-payment for services provided; and responsibility for payment of non-covered services.     Patient would like the video invitation sent by:  My Chart    Mode of Communication:  Video Conference via Amwell    Distant Location (Provider):  On-site    As the provider I attest to compliance with applicable laws and regulations related to telemedicine.    Visit Activities (Refresh list every visit): [facility] Only    Diagnostic Assessment Date: 12/14/22  Treatment Plan Review Date: 4/11/23  See Flowsheets for today's PHQ-9 and MATT-7 results  Previous PHQ-9:   PHQ-9 SCORE 12/14/2022 2/1/2023 3/7/2023   PHQ-9 Total Score MyChart 0 0 0   PHQ-9 Total Score 0 0 0     Previous MATT-7:   MATT-7 SCORE 10/28/2022 12/14/2022   Total Score - 0 (minimal anxiety)   Total Score 13 0       NIRAV LEVEL:  No flowsheet data found.    DATA  Extended Session (60+ minutes): No  Interactive  Complexity: No  Crisis: No  [facility] Patient: No    Treatment Objective(s) Addressed in This Session:  Target Behavior(s): anxiety    Anxiety: will experience a reduction in anxiety    Current Stressors / Issues:  Last couple of weeks have not been great due to having to put their dog down a few days ago.  Has had this dog in her life since she was 5 years old.  Pt was able to talk about her loss and shares that she feels it was helpful that she was able to make the decision that it was time to put the dog down and that the entire family as well as their other dog was able to be present.  Reports that school and work are going well.  Shares that she is falling behind a little bit in school given having to put her dog down.  Does have Spring Break next week and isn't worried about not being able to get caught up.  Is going to watch their other family dog while family goes on a short spring break getaway.        Progress on Treatment Objective(s) / Homework:  Stable - ACTION (Actively working towards change); Intervened by reinforcing change plan / affirming steps taken    Motivational Interviewing    MI Intervention: Expressed Empathy/Understanding, Permission to raise concern or advise, Open-ended questions and Reflections: simple and complex     Change Talk Expressed by the Patient: Desire to change Committment to change    Provider Response to Change Talk: E - Evoked more info from patient about behavior change, A - Affirmed patient's thoughts, decisions, or attempts at behavior change, R - Reflected patient's change talk and S - Summarized patient's change talk statements      Care Plan review completed: No    Medication Review:  Changes to psychiatric medications, see updated Medication List in EPIC.      Medication Compliance:  Yes    Changes in Health Issues:   None reported    Chemical Use Review:   Substance Use: Chemical use reviewed, no active concerns identified      Tobacco Use: No current tobacco use.       Assessment: Current Emotional / Mental Status (status of significant symptoms):  Risk status (Self / Other harm or suicidal ideation)  Patient denies a history of suicidal ideation, suicide attempts, self-injurious behavior, homicidal ideation, homicidal behavior and and other safety concerns  Patient denies current fears or concerns for personal safety.  Patient denies current or recent suicidal ideation or behaviors.  Patient denies current or recent homicidal ideation or behaviors.  Patient denies current or recent self injurious behavior or ideation.  Patient denies other safety concerns.  A safety and risk management plan has not been developed at this time, however patient was encouraged to call [place] [phone] should there be a change in any of these risk factors.    Appearance:   Appropriate   Eye Contact:   Good   Psychomotor Behavior: Normal   Attitude:   Cooperative   Orientation:   All  Speech   Rate / Production: Normal/ Responsive Normal    Volume:  Normal   Mood:    Normal Sad -sad when talking about dog  Affect:    Appropriate  Tearful- tearful when talking about dog  Thought Content:  Clear   Thought Form:  Coherent  Logical   Insight:    Good     Diagnoses:  1. Anxiety disorder, unspecified type    2. Recurrent major depressive disorder, in remission (H)    3. Binge eating disorder        Collateral Reports Completed:  Not Applicable    Plan: (Homework, other):  Patient was given information about behavioral services and encouraged to schedule a follow up appointment with the clinic [facility] in 3 weeks.  She was also given information about mental health symptoms and treatment options .  CD Recommendations: No indications of CD issues.  KVNG Torres, [facility]                                              Individual Treatment Plan    Patient's Name: Mirna Carl  YOB: 2005    Date of Creation: 1/11/23   Date Treatment Plan Last Reviewed/Revised: 1/11/23     DSM5  Diagnoses: 296.30 (F33.9) Major Depressive Disorder, Recurrent Episode, Unspecified _ and With anxious distress or 300.00 (F41.9) Unspecified Anxiety Disorder  Psychosocial / Contextual Factors: school stress, moving out, dog passed away  PROMIS (reviewed every 90 days): 40    Referral / Collaboration:  Referral to another professional/service is not indicated at this time.    Anticipated number of session for this episode of care: 6-9 sessions  Anticipation frequency of session: Monthly  Anticipated Duration of each session: 38-52 minutes  Treatment plan will be reviewed in 90 days or when goals have been changed.       MeasurableTreatment Goal(s) related to diagnosis / functional impairment(s)  Goal 1: Patient will experience a reduction in anxious symptoms, along with a corresponding increase in relaxed emotional state.    I will know I've met my goal when anxious worries/fears decrease.      Objective #A (Patient Action)    Patient will use cognitive strategies identified in therapy to challenge anxious thoughts.  Status: New - Date: 1/11/23     Intervention(s)  Therapist will utilize CBT and ACT to help pt challenge anxious thoughts and reduce intensity/duration of anxious distress.      Patient has reviewed and agreed to the above plan.        Ligia Garcia, MSTODD, LICSW, [facility]  March 7, 2023

## 2023-03-16 ASSESSMENT — ENCOUNTER SYMPTOMS
STIFFNESS: 1
JAUNDICE: 0
HEARTBURN: 1
NECK PAIN: 1
BACK PAIN: 0
JOINT SWELLING: 0
NAUSEA: 1
MYALGIAS: 0
POOR WOUND HEALING: 0
RECTAL PAIN: 0
ABDOMINAL PAIN: 0
MUSCLE WEAKNESS: 0
SKIN CHANGES: 0
ARTHRALGIAS: 0
MUSCLE CRAMPS: 0
DIARRHEA: 0
CONSTIPATION: 0
BLOOD IN STOOL: 1
VOMITING: 0
BLOATING: 0
BOWEL INCONTINENCE: 0
NAIL CHANGES: 0

## 2023-03-17 ENCOUNTER — OFFICE VISIT (OUTPATIENT)
Dept: ENDOCRINOLOGY | Facility: CLINIC | Age: 18
End: 2023-03-17
Payer: COMMERCIAL

## 2023-03-17 VITALS
SYSTOLIC BLOOD PRESSURE: 114 MMHG | BODY MASS INDEX: 40.06 KG/M2 | WEIGHT: 219 LBS | HEART RATE: 88 BPM | DIASTOLIC BLOOD PRESSURE: 72 MMHG

## 2023-03-17 DIAGNOSIS — E04.2 MULTIPLE THYROID NODULES: ICD-10-CM

## 2023-03-17 DIAGNOSIS — E06.3 HASHIMOTO'S THYROIDITIS: Primary | ICD-10-CM

## 2023-03-17 PROCEDURE — 99214 OFFICE O/P EST MOD 30 MIN: CPT | Performed by: INTERNAL MEDICINE

## 2023-03-17 NOTE — LETTER
3/17/2023         RE: Mirna Carl  [address] [id number]        Dear Colleague,    Thank you for referring your patient, Mirna Carl, to the [facility]. Please see a copy of my visit note below.      ENDOCRINOLOGY FOLLOW-UP        HISTORY OF PRESENT ILLNESS    Mirna (prefers to be called Paolo) SALOMÓN Carl is seen in follow-up.    Has been feeling well since last visit.  Energy is good.  Menstrual cycles are occurring regularly every month.    Only new event is that she has noted trigger fingers: Waking with flexed fifth fingers, right more than left, the past few mornings.    She has not noted a change in the feel of her neck.    Has not followed up with Dr. Crowe in weight management: Has felt that she has been doing fairly well.  Saw RD in 2/2023.    Pertinent endocrine and related history:  1.  Hashimoto's thyroiditis.  Positive thyroid peroxidase antibody in 2020.  Normal thyroid function tests.  2.  History of thyroid nodules.  Discovered on physical exam while she was in eighth grade, for which she was referred to endocrinology. Previous thyroid ultrasounds revealed small nodules and heterogenous echotexture of the thyroid.  -9/28/2018, [facility]: Moderately heterogenous gland. 6 x 6 x 4 mm hypoechoic nodular area in the inferior right lobe, no vascularity or calcifications. 7 x 3 x 4 mm hypoechoic nodular area in the left mid thyroid, no vascularity or calcifications.  -10/16/2020, [facility]: Heterogenous echotexture of thyroid. 6 x 6 x 3 mm right mid thyroid nodule, hypoechoic, solid.  Previously seen left thyroid nodule was not present on this exam.  3.  Obesity.  History of consistent weight gain since early childhood, for which she has been referred to weight management clinic. She notes she had a 15lb weight gain since 01/2022 despite having an active job as a  and making dietary changes. Patient diagnosed with  binge eating disorder and started on Vyvanse in 08/2022, following which she experienced a 4 lb weight loss. She was referred to the [facility] and underwent an intake, and was recommended to join their intensive day program. This is not compatible with her school schedule, so she has not followed up further. Patient has been on combined OCP since age 12 for heavy menstrual cycles. Notes her menstrual cycles have been regular, except one missed period 2 months ago. She had a normal cycle the following month.     Pertinent Social History: Patient is a college student at  [employer]. She works as a  at the [employer]. Patient does not consume alcohol or smoke cigarettes.     PAST MEDICAL HISTORY  Past Medical History:   Diagnosis Date     Anxiety disorder 03/28/2022     Depressive disorder      Hashimoto's thyroiditis 03/28/2022     Moderate episode of recurrent major depressive disorder (H) 03/28/2022     Seasonal allergic rhinitis 03/28/2022     Uncomplicated asthma        MEDICATIONS  Current Outpatient Medications   Medication Sig Dispense Refill     cetirizine (ZYRTEC) 10 MG tablet Take 1 tablet (10 mg) by mouth daily 90 tablet 0     cetirizine (ZYRTEC) 10 MG tablet        ferrous sulfate (FEROSUL) 325 (65 Fe) MG tablet Take 1 tablet (325 mg) by mouth daily (with breakfast) 30 tablet 0     FLUoxetine (PROZAC) 40 MG capsule TAKE 1 CAPSULE(40 MG) BY MOUTH DAILY 90 capsule 1     FLUoxetine 20 MG tablet Take 2 tablets (40 mg) by mouth daily for 180 days 180 tablet 1     levonorgestrel-ethinyl estradiol (AVIANE) 0.1-20 MG-MCG tablet , TAKE 1 TABLET BY MOUTH DAILY 84 tablet 4     lisdexamfetamine (VYVANSE) 40 MG capsule Take 1 capsule (40 mg) by mouth every morning 90 capsule 0     lisdexamfetamine (VYVANSE) 40 MG capsule Take 1 capsule (40 mg) by mouth every morning 90 capsule 0     ondansetron (ZOFRAN ODT) 4 MG ODT tab Take 1 tablet (4 mg) by mouth every 8 hours as needed for nausea 30  tablet 1       Allergies, family, and social history were reviewed and documented as needed in EHR.     REVIEW OF SYSTEMS  A focused ROS was performed, with pertinent positives and negatives as noted in the HPI.      PHYSICAL EXAM  LMP 02/21/2023 (Exact Date)   There is no height or weight on file to calculate BMI.  Constitutional: Vital signs reviewed, as recorded above. Patient is alert, oriented and appears in no acute distress.  Eyes: PER, EOMI, no stare, lid lag, or retraction; no conjunctival injection.  Neck: Neck supple, thyroid is palpable, no nodules on exam but not markedly enlarged perhaps upper limit of normal and left lobe more prominent, no tenderness on exam.  Lymphatic: No cervical or supraclavicular LAD.  Cardiovascular: RRR, normal S1/S2, no audible murmurs, rubs or gallops; No LE edema.  Respiratory: CTAB, without wheezes, crackles or rhonchi; normal chest wall motion and respiratory effort.  GI: Positive bowel sounds, soft, NT/ND.  MSK: No clubbing or cyanosis; normal muscle bulk and tone.  Skin: Normal skin color, temperature, turgor and texture, narrow pink stretch marks visible on lower abdomen.   Neurological: Alert and oriented times 3. Fine tremor.    DATA REVIEW  Each of the following laboratory and/or imaging studies were reviewed.    Component      Latest Ref Rng & Units 9/16/2022 2/21/2023   Calcitonin      0.0 - 5.1 pg/mL <2.0    TSH      0.50 - 4.30 uIU/mL  1.45   Thyroglobulin Antibody      <40 IU/mL  <20   Thyroid Peroxidase Antibody      <35 IU/mL  27   Hemoglobin A1C      0.0 - 5.6 %  5.2   Thyroglobulin by Immunoassay      <55.0 ng/mL  59.0 (H)       ASSESSMENT  1.  Hashimoto's thyroiditis.  Based on positive TPO antibody.  Clinically and biochemically euthyroid.  We discussed recent labs drawn in primary care: Limited utility to checking antithyroid antibodies given known autoimmune disease and expected fluctuation in titers.  Elevated thyroglobulin is likely the result of  autoimmune thyroid disease.  We discussed natural history of autoimmune thyroid disease and potential for higher risk of development of hypothyroidism: Since thyroid function tests checked in 2/2023 were normal, we will anticipate annual monitoring of thyroid function (closer follow-up if symptoms).      2.  History of thyroid nodules.  Normal calcitonin in 9/2022.  I received and reviewed images of prior thyroid ultrasounds from [facility]: Thyroid gland appears heterogenous and there are subcentimeter nodules, stable on the right and no left-sided nodule on follow-up ultrasound completed most recently in 2020.  Left-sided nodule may have been a pseudonodule related to autoimmune thyroid disease.  We will check follow-up ultrasound in the coming weeks.  If stable, anticipate follow-up for exam and check of thyroid function tests in 1 year.    3.  Binge eating disorder and obesity.  Following in weight management clinic.    4.  Trigger fingers.  Recommend follow-up in primary care.    PLAN  -Thyroid ultrasound at next convenience  -If ultrasound findings are stable/acceptable, we will plan for return visit in one year--sooner if new symptoms or concerns  -We will communicate results via [employer], or if needed by phone      I personally spent a total of 38 minutes on the date of encounter reviewing medical records, evaluating the patient, coordinating care and documenting in the EHR, as detailed above.    Bozena Crawley MD   Division of Diabetes, Endocrinology and Metabolism  Department of Medicine      cc: Kaylynn Ferraro MD               Again, thank you for allowing me to participate in the care of your patient.        Sincerely,        BOZENA Crawley MD

## 2023-03-17 NOTE — PATIENT INSTRUCTIONS
-Thyroid ultrasound at next convenience  -If ultrasound findings are stable/acceptable, we will plan for return visit in one year--sooner if new symptoms or concerns  -We will communicate results via [employer], or if needed by phone

## 2023-03-17 NOTE — PROGRESS NOTES
ENDOCRINOLOGY FOLLOW-UP        HISTORY OF PRESENT ILLNESS    Mirna (prefers to be called Paolo) SALOMÓN Carl is seen in follow-up.    Has been feeling well since last visit.  Energy is good.  Menstrual cycles are occurring regularly every month.    Only new event is that she has noted trigger fingers: Waking with flexed fifth fingers, right more than left, the past few mornings.    She has not noted a change in the feel of her neck.    Has not followed up with Dr. Crowe in weight management: Has felt that she has been doing fairly well.  Saw RD in 2/2023.    Pertinent endocrine and related history:  1.  Hashimoto's thyroiditis.  Positive thyroid peroxidase antibody in 2020.  Normal thyroid function tests.  2.  History of thyroid nodules.  Discovered on physical exam while she was in eighth grade, for which she was referred to endocrinology. Previous thyroid ultrasounds revealed small nodules and heterogenous echotexture of the thyroid.  -9/28/2018, [facility]: Moderately heterogenous gland. 6 x 6 x 4 mm hypoechoic nodular area in the inferior right lobe, no vascularity or calcifications. 7 x 3 x 4 mm hypoechoic nodular area in the left mid thyroid, no vascularity or calcifications.  -10/16/2020, [facility]: Heterogenous echotexture of thyroid. 6 x 6 x 3 mm right mid thyroid nodule, hypoechoic, solid.  Previously seen left thyroid nodule was not present on this exam.  3.  Obesity.  History of consistent weight gain since early childhood, for which she has been referred to weight management clinic. She notes she had a 15lb weight gain since 01/2022 despite having an active job as a  and making dietary changes. Patient diagnosed with binge eating disorder and started on Vyvanse in 08/2022, following which she experienced a 4 lb weight loss. She was referred to [facility] and underwent an intake, and was recommended to join their intensive day program. This is not compatible with her  school schedule, so she has not followed up further. Patient has been on combined OCP since age 12 for heavy menstrual cycles. Notes her menstrual cycles have been regular, except one missed period 2 months ago. She had a normal cycle the following month.     Pertinent Social History: Patient is a college student at [employer]. She works as a  at the [employer] and [employer]. Patient does not consume alcohol or smoke cigarettes.     PAST MEDICAL HISTORY  Past Medical History:   Diagnosis Date     Anxiety disorder 03/28/2022     Depressive disorder      Hashimoto's thyroiditis 03/28/2022     Moderate episode of recurrent major depressive disorder (H) 03/28/2022     Seasonal allergic rhinitis 03/28/2022     Uncomplicated asthma        MEDICATIONS  Current Outpatient Medications   Medication Sig Dispense Refill     cetirizine (ZYRTEC) 10 MG tablet Take 1 tablet (10 mg) by mouth daily 90 tablet 0     cetirizine (ZYRTEC) 10 MG tablet        ferrous sulfate (FEROSUL) 325 (65 Fe) MG tablet Take 1 tablet (325 mg) by mouth daily (with breakfast) 30 tablet 0     FLUoxetine (PROZAC) 40 MG capsule TAKE 1 CAPSULE(40 MG) BY MOUTH DAILY 90 capsule 1     FLUoxetine 20 MG tablet Take 2 tablets (40 mg) by mouth daily for 180 days 180 tablet 1     levonorgestrel-ethinyl estradiol (AVIANE) 0.1-20 MG-MCG tablet , TAKE 1 TABLET BY MOUTH DAILY 84 tablet 4     lisdexamfetamine (VYVANSE) 40 MG capsule Take 1 capsule (40 mg) by mouth every morning 90 capsule 0     lisdexamfetamine (VYVANSE) 40 MG capsule Take 1 capsule (40 mg) by mouth every morning 90 capsule 0     ondansetron (ZOFRAN ODT) 4 MG ODT tab Take 1 tablet (4 mg) by mouth every 8 hours as needed for nausea 30 tablet 1       Allergies, family, and social history were reviewed and documented as needed in EHR.     REVIEW OF SYSTEMS  A focused ROS was performed, with pertinent positives and negatives as noted in the HPI.      PHYSICAL EXAM  LMP 02/21/2023 (Exact Date)    There is no height or weight on file to calculate BMI.  Constitutional: Vital signs reviewed, as recorded above. Patient is alert, oriented and appears in no acute distress.  Eyes: PER, EOMI, no stare, lid lag, or retraction; no conjunctival injection.  Neck: Neck supple, thyroid is palpable, no nodules on exam but not markedly enlarged perhaps upper limit of normal and left lobe more prominent, no tenderness on exam.  Lymphatic: No cervical or supraclavicular LAD.  Cardiovascular: RRR, normal S1/S2, no audible murmurs, rubs or gallops; No LE edema.  Respiratory: CTAB, without wheezes, crackles or rhonchi; normal chest wall motion and respiratory effort.  GI: Positive bowel sounds, soft, NT/ND.  MSK: No clubbing or cyanosis; normal muscle bulk and tone.  Skin: Normal skin color, temperature, turgor and texture, narrow pink stretch marks visible on lower abdomen.   Neurological: Alert and oriented times 3. Fine tremor.    DATA REVIEW  Each of the following laboratory and/or imaging studies were reviewed.    Component      Latest Ref Rng & Units 9/16/2022 2/21/2023   Calcitonin      0.0 - 5.1 pg/mL <2.0    TSH      0.50 - 4.30 uIU/mL  1.45   Thyroglobulin Antibody      <40 IU/mL  <20   Thyroid Peroxidase Antibody      <35 IU/mL  27   Hemoglobin A1C      0.0 - 5.6 %  5.2   Thyroglobulin by Immunoassay      <55.0 ng/mL  59.0 (H)       ASSESSMENT  1.  Hashimoto's thyroiditis.  Based on positive TPO antibody.  Clinically and biochemically euthyroid.  We discussed recent labs drawn in primary care: Limited utility to checking antithyroid antibodies given known autoimmune disease and expected fluctuation in titers.  Elevated thyroglobulin is likely the result of autoimmune thyroid disease.  We discussed natural history of autoimmune thyroid disease and potential for higher risk of development of hypothyroidism: Since thyroid function tests checked in 2/2023 were normal, we will anticipate annual monitoring of thyroid  function (closer follow-up if symptoms).      2.  History of thyroid nodules.  Normal calcitonin in 9/2022.  I received and reviewed images of prior thyroid ultrasounds from [facility]: Thyroid gland appears heterogenous and there are subcentimeter nodules, stable on the right and no left-sided nodule on follow-up ultrasound completed most recently in 2020.  Left-sided nodule may have been a pseudonodule related to autoimmune thyroid disease.  We will check follow-up ultrasound in the coming weeks.  If stable, anticipate follow-up for exam and check of thyroid function tests in 1 year.    3.  Binge eating disorder and obesity.  Following in weight management clinic.    4.  Trigger fingers.  Recommend follow-up in primary care.    PLAN  -Thyroid ultrasound at next convenience  -If ultrasound findings are stable/acceptable, we will plan for return visit in one year--sooner if new symptoms or concerns  -We will communicate results via [place], or if needed by phone      I personally spent a total of 38 minutes on the date of encounter reviewing medical records, evaluating the patient, coordinating care and documenting in the EHR, as detailed above.    Kay Crawley MD   Division of Diabetes, Endocrinology and Metabolism  Department of Medicine      cc: Kaylynn Ferraro MD

## 2023-03-28 ENCOUNTER — VIRTUAL VISIT (OUTPATIENT)
Dept: BEHAVIORAL HEALTH | Facility: CLINIC | Age: 18
End: 2023-03-28
Payer: COMMERCIAL

## 2023-03-28 DIAGNOSIS — F41.9 ANXIETY DISORDER, UNSPECIFIED TYPE: Primary | ICD-10-CM

## 2023-03-28 DIAGNOSIS — F33.40 RECURRENT MAJOR DEPRESSIVE DISORDER, IN REMISSION (H): ICD-10-CM

## 2023-03-28 PROCEDURE — 90834 PSYTX W PT 45 MINUTES: CPT | Mod: VID | Performed by: SOCIAL WORKER

## 2023-03-28 NOTE — PROGRESS NOTES
[facility]: Integrated Behavioral Health  March 28, 2023        Behavioral Health Clinician Progress Note    Patient Name: Mirna Carl           Service Type:  Individual      [place] [facility]   [facility] [place] (patient reached)     Session Start Time: 12:32p  Session End Time: 1:14p      Session Length: 38 - 52      Attendees: Client     Service Modality:  Video Visit:      Provider verified identity through the following two step process.  Patient provided:  Patient is known previously to provider    Telemedicine Visit: The patient's condition can be safely assessed and treated via synchronous audio and visual telemedicine encounter.      Reason for Telemedicine Visit: Patient has requested telehealth visit    Originating Site (Patient Location): Patient's home    Distant Site (Provider Location): Provider Remote Setting- Home Office    Consent:  The patient/guardian has verbally consented to: the potential risks and benefits of telemedicine (video visit) versus in person care; bill my insurance or make self-payment for services provided; and responsibility for payment of non-covered services.     Patient would like the video invitation sent by:  My Chart    Mode of Communication:  Video Conference via [facility]    Distant Location (Provider):  On-site    As the provider I attest to compliance with applicable laws and regulations related to telemedicine.    Visit Activities (Refresh list every visit): [facility] Only    Diagnostic Assessment Date: 12/14/22  Treatment Plan Review Date: 4/11/23  See Flowsheets for today's PHQ-9 and MATT-7 results  Previous PHQ-9:   PHQ-9 SCORE 12/14/2022 2/1/2023 3/7/2023   PHQ-9 Total Score MyChart 0 0 0   PHQ-9 Total Score 0 0 0     Previous MATT-7:   MATT-7 SCORE 10/28/2022 12/14/2022   Total Score - 0 (minimal anxiety)   Total Score 13 0       NIRAV LEVEL:  No flowsheet data found.    DATA  Extended Session (60+ minutes): No  Interactive  Complexity: No  Crisis: No  [facility] Patient: No    Treatment Objective(s) Addressed in This Session:  Target Behavior(s): anxiety    Anxiety: will experience a reduction in anxiety    Current Stressors / Issues:  Reports doing well since dog passed.  Shares that their other family dog is thriving which she was happy to see given their remaining dog has never been without the dog that passed.    Feels is caught up a bit better in classes but is struggling in a couple of classes with grades.  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    Living situation continues to be going well.  In summer all roommates will move out and pt will get 1 new one initially and possibly move over summer.  Isn't keeping up on piliates but shares that is active with work and walking around the camp.  Reports has lost some weight recently.      Progress on Treatment Objective(s) / Homework:  Stable - ACTION (Actively working towards change); Intervened by reinforcing change plan / affirming steps taken    Motivational Interviewing    MI Intervention: Expressed Empathy/Understanding, Permission to raise concern or advise, Open-ended questions and Reflections: simple and complex     Change Talk Expressed by the Patient: Desire to change Committment to change    Provider Response to Change Talk: E - Evoked more info from patient about behavior change, A - Affirmed patient's thoughts, decisions, or attempts at behavior change, R - Reflected patient's change talk and S - Summarized patient's change talk statements      Care Plan review completed: No    Medication Review:  Changes to psychiatric medications, see updated Medication List in EPIC.      Medication Compliance:  Yes    Changes in Health Issues:   None reported    Chemical Use Review:   Substance Use: Chemical use reviewed, no active concerns identified      Tobacco Use: No current tobacco use.      Assessment: Current Emotional / Mental Status (status  of significant symptoms):  Risk status (Self / Other harm or suicidal ideation)  Patient denies a history of suicidal ideation, suicide attempts, self-injurious behavior, homicidal ideation, homicidal behavior and and other safety concerns  Patient denies current fears or concerns for personal safety.  Patient denies current or recent suicidal ideation or behaviors.  Patient denies current or recent homicidal ideation or behaviors.  Patient denies current or recent self injurious behavior or ideation.  Patient denies other safety concerns.  A safety and risk management plan has not been developed at this time, however patient was encouraged to call [place] [phone] should there be a change in any of these risk factors.    Appearance:   Appropriate   Eye Contact:   Good   Psychomotor Behavior: Normal   Attitude:   Cooperative   Orientation:   All  Speech   Rate / Production: Normal/ Responsive Normal    Volume:  Normal   Mood:    Normal  Affect:    Appropriate   Thought Content:  Clear   Thought Form:  Coherent  Logical   Insight:    Good     Diagnoses:  1. Anxiety disorder, unspecified type    2. Recurrent major depressive disorder, in remission (H)        Collateral Reports Completed:  Not Applicable    Plan: (Homework, other):  Patient was given information about behavioral services and encouraged to schedule a follow up appointment with the clinic [facility] in 2 weeks.  She was also given information about mental health symptoms and treatment options .  CD Recommendations: No indications of CD issues.  Ligia Garcia, RODRI, [facility]                                              Individual Treatment Plan    Patient's Name: Mirna Carl  YOB: 2005    Date of Creation: 1/11/23   Date Treatment Plan Last Reviewed/Revised: 1/11/23     DSM5 Diagnoses: 296.30 (F33.9) Major Depressive Disorder, Recurrent Episode, Unspecified _ and With anxious distress or 300.00 (F41.9) Unspecified Anxiety  Disorder  Psychosocial / Contextual Factors: school stress, moving out, dog passed away  PROMIS (reviewed every 90 days): 40    Referral / Collaboration:  Referral to another professional/service is not indicated at this time.    Anticipated number of session for this episode of care: 6-9 sessions  Anticipation frequency of session: Monthly  Anticipated Duration of each session: 38-52 minutes  Treatment plan will be reviewed in 90 days or when goals have been changed.       MeasurableTreatment Goal(s) related to diagnosis / functional impairment(s)  Goal 1: Patient will experience a reduction in anxious symptoms, along with a corresponding increase in relaxed emotional state.    I will know I've met my goal when anxious worries/fears decrease.      Objective #A (Patient Action)    Patient will use cognitive strategies identified in therapy to challenge anxious thoughts.  Status: New - Date: 1/11/23     Intervention(s)  Therapist will utilize CBT and ACT to help pt challenge anxious thoughts and reduce intensity/duration of anxious distress.      Patient has reviewed and agreed to the above plan.        Ligia Garcia, ESVIN, LICSW, [facility]  March 28, 2023

## 2023-04-07 ENCOUNTER — VIRTUAL VISIT (OUTPATIENT)
Dept: SURGERY | Facility: CLINIC | Age: 18
End: 2023-04-07
Payer: COMMERCIAL

## 2023-04-07 DIAGNOSIS — E66.09 CLASS 2 OBESITY DUE TO EXCESS CALORIES WITHOUT SERIOUS COMORBIDITY WITH BODY MASS INDEX (BMI) OF 39.0 TO 39.9 IN ADULT: Primary | ICD-10-CM

## 2023-04-07 DIAGNOSIS — Z71.3 NUTRITIONAL COUNSELING: ICD-10-CM

## 2023-04-07 DIAGNOSIS — F50.819 BINGE EATING DISORDER: ICD-10-CM

## 2023-04-07 DIAGNOSIS — E66.812 CLASS 2 OBESITY DUE TO EXCESS CALORIES WITHOUT SERIOUS COMORBIDITY WITH BODY MASS INDEX (BMI) OF 39.0 TO 39.9 IN ADULT: Primary | ICD-10-CM

## 2023-04-07 PROCEDURE — 97803 MED NUTRITION INDIV SUBSEQ: CPT | Mod: VID | Performed by: DIETITIAN, REGISTERED

## 2023-04-07 NOTE — LETTER
4/7/2023         RE: Mirna Carl  [address] [id number]        Dear Colleague,    Thank you for referring your patient, Mirna Carl, to the [facility] [place]. Please see a copy of my visit note below.    Mirna Carl is a 17 year old who is being evaluated via a billable video visit.      How would you like to obtain your AVS? MyChart  If the video visit is dropped, the invitation should be resent by: Send to e-mail at: [email or web address]  Will anyone else be joining your video visit? No        Medical  Weight Loss Follow-Up Diet Evaluation  Assessment:  Mirna is presenting today for a follow up weight management nutrition consultation.  This patient has had an initial appointment and was referred by Dr. Crowe for MNT as treatment for Obesity.    Weight loss medication: Vyvanse.  Pt's weight is 215 lbs  Initial weight: 236.6 lbs  Weight change: 21.6 lbs (down)         View : No data to display.              BMI: There is no height or weight on file to calculate BMI.  Patient height not recorded    Estimated RMR ([place]-St Jeor equation):   1705 kcals x 1.3 (light active) = 2217 kcals (for weight maintenance)     Recommended Protein Intake: 60-80 grams of protein/day  Patient Active Problem List:  Patient Active Problem List   Diagnosis     Anxiety disorder     Hashimoto's thyroiditis     Moderate episode of recurrent major depressive disorder (H)     Seasonal allergic rhinitis     Binge eating disorder       Progress on goals from last visit: Continues to focus on protein first at each meal (I.e. Protein Bar, Eggs, Chicken, Turkey, Cheese, etc...).  Continues to focus on adequate consumption of non starchy vegetables (carrots more so lately) as well as some fruit consumption (I.e. apples).     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     Intervention:  1. Food and/or nutrient delivery: balanced meals, adequate protein   2. Nutrition education: none   3. Nutrition counseling: provided support and encouragement     Monitoring/Evaluation:    Goals:  1. Continue to focus on protein first at each meal, aiming for 60-80 grams of protein/day.   2. Continue to work on increased activity throughout the week, aiming for 150 minutes/week.     Patient to follow up in 3 month(s) with FAUSTO      Video-Visit Details    Type of service:  Video Visit    Video Start Time (time video started): 1:55 pm     Video End Time (time video stopped): 2:08 pm    Originating Location (pt. Location): Home        Distant Location (provider location):  Off-site    Mode of Communication:  Video Conference via [facility]    Physician has received verbal consent for a Video Visit from the patient? Yes      Lucero Cannon RD            Again, thank you for allowing me to participate in the care of your patient.        Sincerely,        Lucero Cannon RD

## 2023-04-07 NOTE — PROGRESS NOTES
Mirna Carl is a 17 year old who is being evaluated via a billable video visit.      How would you like to obtain your AVS? MyChart  If the video visit is dropped, the invitation should be resent by: Send to e-mail at: [email or web address]  Will anyone else be joining your video visit? No        Medical  Weight Loss Follow-Up Diet Evaluation  Assessment:  Mirna is presenting today for a follow up weight management nutrition consultation.  This patient has had an initial appointment and was referred by Dr. Crowe for MNT as treatment for Obesity.    Weight loss medication: Vyvanse.  Pt's weight is 215 lbs  Initial weight: 236.6 lbs  Weight change: 21.6 lbs (down)         View : No data to display.              BMI: There is no height or weight on file to calculate BMI.  Patient height not recorded    Estimated RMR ([place]-St Jeor equation):   1705 kcals x 1.3 (light active) = 2217 kcals (for weight maintenance)     Recommended Protein Intake: 60-80 grams of protein/day  Patient Active Problem List:  Patient Active Problem List   Diagnosis     Anxiety disorder     Hashimoto's thyroiditis     Moderate episode of recurrent major depressive disorder (H)     Seasonal allergic rhinitis     Binge eating disorder       Progress on goals from last visit: Continues to focus on protein first at each meal (I.e. Protein Bar, Eggs, Chicken, Turkey, Cheese, etc...).  Continues to focus on adequate consumption of non starchy vegetables (carrots more so lately) as well as some fruit consumption (I.e. apples).     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     Intervention:  1. Food and/or nutrient delivery: balanced meals,  adequate protein   2. Nutrition education: none   3. Nutrition counseling: provided support and encouragement     Monitoring/Evaluation:    Goals:  1. Continue to focus on protein first at each meal, aiming for 60-80 grams of protein/day.   2. Continue to work on increased activity throughout the week, aiming for 150 minutes/week.     Patient to follow up in 3 month(s) with RD      Video-Visit Details    Type of service:  Video Visit    Video Start Time (time video started): 1:55 pm     Video End Time (time video stopped): 2:08 pm    Originating Location (pt. Location): Home        Distant Location (provider location):  Off-site    Mode of Communication:  Video Conference via [facility]    Physician has received verbal consent for a Video Visit from the patient? Yes      Lucero Cannon, FAUSTO

## 2023-04-12 ENCOUNTER — VIRTUAL VISIT (OUTPATIENT)
Dept: BEHAVIORAL HEALTH | Facility: CLINIC | Age: 18
End: 2023-04-12
Payer: COMMERCIAL

## 2023-04-12 DIAGNOSIS — F41.9 ANXIETY DISORDER, UNSPECIFIED TYPE: Primary | ICD-10-CM

## 2023-04-12 DIAGNOSIS — F33.40 RECURRENT MAJOR DEPRESSIVE DISORDER, IN REMISSION (H): ICD-10-CM

## 2023-04-12 PROCEDURE — 90834 PSYTX W PT 45 MINUTES: CPT | Mod: VID | Performed by: SOCIAL WORKER

## 2023-04-12 NOTE — PROGRESS NOTES
[facility] Primary Care: Integrated Behavioral Health  April 12, 2023          Behavioral Health Clinician Progress Note    Patient Name: Mirna Carl           Service Type:  Individual      Service Location:   [facility] / Email (patient reached)     Session Start Time: 2:09p  Session End Time: 2:52p      Session Length: 38 - 52      Attendees: Client     Service Modality:  Video Visit:      Provider verified identity through the following two step process.  Patient provided:  Patient is known previously to provider    Telemedicine Visit: The patient's condition can be safely assessed and treated via synchronous audio and visual telemedicine encounter.      Reason for Telemedicine Visit: Patient has requested telehealth visit    Originating Site (Patient Location): Patient's home    Distant Site (Provider Location): Provider Remote Setting- Home Office    Consent:  The patient/guardian has verbally consented to: the potential risks and benefits of telemedicine (video visit) versus in person care; bill my insurance or make self-payment for services provided; and responsibility for payment of non-covered services.     Patient would like the video invitation sent by:  My Chart    Mode of Communication:  Video Conference via [facility]    Distant Location (Provider):  Off-site    As the provider I attest to compliance with applicable laws and regulations related to telemedicine.    Visit Activities (Refresh list every visit): [facility] Only    Diagnostic Assessment Date: 12/14/22  Treatment Plan Review Date: 7/12/23  See Flowsheets for today's PHQ-9 and MATT-7 results  Previous PHQ-9:       2/1/2023    11:35 AM 3/7/2023    12:20 PM 4/12/2023     9:55 AM   PHQ-9 SCORE   PHQ-9 Total Score MyChart 0 0 0   PHQ-9 Total Score 0 0 0     Previous MATT-7:       10/28/2022     4:19 PM 12/14/2022    11:09 AM   MATT-7 SCORE   Total Score  0 (minimal anxiety)   Total Score 13 0       NIRAV LEVEL:       View : No  data to display.                DATA  Extended Session (60+ minutes): No  Interactive Complexity: No  Crisis: No  [facility] Patient: No    Treatment Objective(s) Addressed in This Session:  Target Behavior(s): anxiety    Anxiety: will experience a reduction in anxiety    Current Stressors / Issues:  Shares that she is doing well and really enjoying the warm weather.  Has been trying to get her family dog out for walks when able.  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    School is ok although she reports that she continues to struggle a bit with interest in her classes.    Has a friend that wants to get an apartment together this summer.  Pt unsure financially if she can afford it at this time.  Discussed weighing the pros and cons and being open/honest with friend about concerns.    Looking forward to summer camp with work and getting a break with school.    Progress on Treatment Objective(s) / Homework:  Stable - ACTION (Actively working towards change); Intervened by reinforcing change plan / affirming steps taken    Motivational Interviewing    MI Intervention: Expressed Empathy/Understanding, Permission to raise concern or advise, Open-ended questions and Reflections: simple and complex     Change Talk Expressed by the Patient: Desire to change Committment to change    Provider Response to Change Talk: E - Evoked more info from patient about behavior change, A - Affirmed patient's thoughts, decisions, or attempts at behavior change, R - Reflected patient's change talk and S - Summarized patient's change talk statements      Care Plan review completed: No    Medication Review:  Changes to psychiatric medications, see updated Medication List in EPIC.      Medication Compliance:  Yes    Changes in Health Issues:   None reported    Chemical Use Review:   Substance Use: Chemical use reviewed, no active concerns identified      Tobacco Use: No current tobacco use.       Assessment: Current Emotional / Mental Status (status of significant symptoms):  Risk status (Self / Other harm or suicidal ideation)  Patient denies a history of suicidal ideation, suicide attempts, self-injurious behavior, homicidal ideation, homicidal behavior and and other safety concerns  Patient denies current fears or concerns for personal safety.  Patient denies current or recent suicidal ideation or behaviors.  Patient denies current or recent homicidal ideation or behaviors.  Patient denies current or recent self injurious behavior or ideation.  Patient denies other safety concerns.  A safety and risk management plan has not been developed at this time, however patient was encouraged to call [place] [phone] should there be a change in any of these risk factors.    Appearance:   Appropriate   Eye Contact:   Good   Psychomotor Behavior: Normal   Attitude:   Cooperative   Orientation:   All  Speech   Rate / Production: Normal/ Responsive Normal    Volume:  Normal   Mood:    Normal  Affect:    Appropriate   Thought Content:  Clear   Thought Form:  Coherent  Logical   Insight:    Good     Diagnoses:  1. Anxiety disorder, unspecified type    2. Recurrent major depressive disorder, in remission (H)        Collateral Reports Completed:  Not Applicable    Plan: (Homework, other):  Patient was given information about behavioral services and encouraged to schedule a follow up appointment with the clinic [facility] in 2 weeks.  She was also given information about mental health symptoms and treatment options .  CD Recommendations: No indications of CD issues.  KVNG Torres, [facility]                                              Individual Treatment Plan    Patient's Name: Mirna Carl  YOB: 2005    Date of Creation: 1/11/23   Date Treatment Plan Last Reviewed/Revised: 4/12/23    DSM5 Diagnoses: 296.30 (F33.9) Major Depressive Disorder, Recurrent Episode, Unspecified _ and With anxious  distress or 300.00 (F41.9) Unspecified Anxiety Disorder  Psychosocial / Contextual Factors: school stress, moving out, dog passed away  PROMIS (reviewed every 90 days): 40    Referral / Collaboration:  Referral to another professional/service is not indicated at this time.    Anticipated number of session for this episode of care: 6-9 sessions  Anticipation frequency of session: Monthly  Anticipated Duration of each session: 38-52 minutes  Treatment plan will be reviewed in 90 days or when goals have been changed.       MeasurableTreatment Goal(s) related to diagnosis / functional impairment(s)  Goal 1: Patient will experience a reduction in anxious symptoms, along with a corresponding increase in relaxed emotional state.    I will know I've met my goal when anxious worries/fears decrease.      Objective #A (Patient Action)    Patient will use cognitive strategies identified in therapy to challenge anxious thoughts.  Status: Continued - Date(s):4/12/23     Intervention(s)  Therapist will utilize CBT and ACT to help pt challenge anxious thoughts and reduce intensity/duration of anxious distress.      Patient has reviewed and agreed to the above plan.        Ligia Garcia, ESVIN, LICSW, [facility]  April 12, 2023

## 2023-04-16 ENCOUNTER — MYC REFILL (OUTPATIENT)
Dept: FAMILY MEDICINE | Facility: CLINIC | Age: 18
End: 2023-04-16
Payer: COMMERCIAL

## 2023-04-16 DIAGNOSIS — F50.819 BINGE EATING DISORDER: ICD-10-CM

## 2023-04-17 RX ORDER — LISDEXAMFETAMINE DIMESYLATE 40 MG/1
40 CAPSULE ORAL EVERY MORNING
Qty: 30 CAPSULE | Refills: 0 | Status: SHIPPED | OUTPATIENT
Start: 2023-04-17 | End: 2023-05-22

## 2023-04-17 RX ORDER — LISDEXAMFETAMINE DIMESYLATE 40 MG/1
40 CAPSULE ORAL EVERY MORNING
Qty: 90 CAPSULE | Refills: 0 | OUTPATIENT
Start: 2023-04-17

## 2023-04-17 NOTE — TELEPHONE ENCOUNTER
Patient is seeing specialist for weight management who is prescribing this controlled substance- please send request to them.  Thanks.     Kaylynn Ferraro MD on 4/17/2023 at 9:04 AM

## 2023-04-17 NOTE — TELEPHONE ENCOUNTER
Please contact patient and ask about cost of medication.  If prohibitively expensive, she should follow-up with Dr. Crowe (may be due to follow-up with Dr. Crowe regardless).

## 2023-05-03 ENCOUNTER — VIRTUAL VISIT (OUTPATIENT)
Dept: BEHAVIORAL HEALTH | Facility: CLINIC | Age: 18
End: 2023-05-03
Payer: COMMERCIAL

## 2023-05-03 DIAGNOSIS — F33.40 RECURRENT MAJOR DEPRESSIVE DISORDER, IN REMISSION (H): ICD-10-CM

## 2023-05-03 DIAGNOSIS — F41.9 ANXIETY DISORDER, UNSPECIFIED TYPE: Primary | ICD-10-CM

## 2023-05-03 PROCEDURE — 90832 PSYTX W PT 30 MINUTES: CPT | Mod: VID | Performed by: SOCIAL WORKER

## 2023-05-03 NOTE — PROGRESS NOTES
[facility] Primary Care: Integrated Behavioral Health  May 3, 2023      Behavioral Health Clinician Progress Note    Patient Name: Mirna Carl           Service Type:  Individual      Service Location:   [facility] / Email (patient reached)      Session Start Time: 12:02p  Session End Time: 12:35p      Session Length: 16 - 37      Attendees: Client     Service Modality:  Video Visit:      Provider verified identity through the following two step process.  Patient provided:  Patient is known previously to provider    Telemedicine Visit: The patient's condition can be safely assessed and treated via synchronous audio and visual telemedicine encounter.      Reason for Telemedicine Visit: Patient has requested telehealth visit    Originating Site (Patient Location): Patient's home    Distant Site (Provider Location): Provider Remote Setting- Home Office    Consent:  The patient/guardian has verbally consented to: the potential risks and benefits of telemedicine (video visit) versus in person care; bill my insurance or make self-payment for services provided; and responsibility for payment of non-covered services.     Patient would like the video invitation sent by:  My Chart    Mode of Communication:  Video Conference via [facility]    Distant Location (Provider):  Off-site    As the provider I attest to compliance with applicable laws and regulations related to telemedicine.    Visit Activities (Refresh list every visit): [facility] Only    Diagnostic Assessment Date: 12/14/22  Treatment Plan Review Date: 7/12/23  See Flowsheets for today's PHQ-9 and MATT-7 results  Previous PHQ-9:       2/1/2023    11:35 AM 3/7/2023    12:20 PM 4/12/2023     9:55 AM   PHQ-9 SCORE   PHQ-9 Total Score MyChart 0 0 0   PHQ-9 Total Score 0 0 0     Previous MATT-7:       10/28/2022     4:19 PM 12/14/2022    11:09 AM   MATT-7 SCORE   Total Score  0 (minimal anxiety)   Total Score 13 0       DATA  Extended Session (60+  minutes): No  Interactive Complexity: No  Crisis: No  [facility] Patient: No    Treatment Objective(s) Addressed in This Session:  Target Behavior(s): anxiety    Anxiety: will experience a reduction in anxiety    Current Stressors / Issues:  Is working this week at day [employer] and has had groups of teens each day which she reports has been stressful.    Dad is going to go back to school in the fall and won't be working which pt is concerned will be a big financial impact on the family.  Pt shares that she doesn't think moving back home next fall is the best option though feels it is her only option due to finances.  Really wants to continue on school path and get her degree in criminology.  With full time school is limited in how much she can work and in turn won't earn enough to afford rent.  Has thought about taking a break from school and continuing to work for the [employer] full time and living where she is living now with covered rent. Is leaning more towards continuing school and moving home.  Is worried about dad going back to school and having to commute to [employer] everyday which will occupy one of the family's 2 cars and will cause a financial burden on the family and likely increased stress.    Pt was at work and on lunch break during visit so visit was a bit shorter than usual.  [facility] sent pt a My Chart message with options to meet again next week.        Progress on Treatment Objective(s) / Homework:  Stable - ACTION (Actively working towards change); Intervened by reinforcing change plan / affirming steps taken    Motivational Interviewing    MI Intervention: Expressed Empathy/Understanding, Permission to raise concern or advise, Open-ended questions and Reflections: simple and complex     Change Talk Expressed by the Patient: Desire to change Committment to change    Provider Response to Change Talk: E - Evoked more info from patient about behavior change, A - Affirmed patient's thoughts, decisions, or attempts at  behavior change, R - Reflected patient's change talk and S - Summarized patient's change talk statements      Care Plan review completed: No    Medication Review:  Changes to psychiatric medications, see updated Medication List in EPIC.      Medication Compliance:  Yes    Changes in Health Issues:   None reported    Chemical Use Review:   Substance Use: Chemical use reviewed, no active concerns identified      Tobacco Use: No current tobacco use.      Assessment: Current Emotional / Mental Status (status of significant symptoms):  Risk status (Self / Other harm or suicidal ideation)  Patient denies a history of suicidal ideation, suicide attempts, self-injurious behavior, homicidal ideation, homicidal behavior and and other safety concerns  Patient denies current fears or concerns for personal safety.  Patient denies current or recent suicidal ideation or behaviors.  Patient denies current or recent homicidal ideation or behaviors.  Patient denies current or recent self injurious behavior or ideation.  Patient denies other safety concerns.  A safety and risk management plan has not been developed at this time, however patient was encouraged to call [place] [phone] should there be a change in any of these risk factors.    Appearance:   Appropriate   Eye Contact:   Good   Psychomotor Behavior: Normal   Attitude:   Cooperative   Orientation:   All  Speech   Rate / Production: Normal/ Responsive Normal    Volume:  Normal   Mood:    Anxious  Irritable   Affect:    Blunted   Thought Content:  Clear   Thought Form:  Coherent  Logical   Insight:    Good     Diagnoses:  1. Anxiety disorder, unspecified type    2. Recurrent major depressive disorder, in remission (H)        Collateral Reports Completed:  Not Applicable    Plan: (Homework, other):  Patient was given information about behavioral services and encouraged to schedule a follow up appointment with the clinic [facility] as needed.  She was also given information about  mental health symptoms and treatment options .  CD Recommendations: No indications of CD issues.  Ligia Garcia, [facility], [facility]                                              Individual Treatment Plan    Patient's Name: Mirna Carl  YOB: 2005    Date of Creation: 1/11/23   Date Treatment Plan Last Reviewed/Revised: 4/12/23    DSM5 Diagnoses: 296.30 (F33.9) Major Depressive Disorder, Recurrent Episode, Unspecified _ and With anxious distress or 300.00 (F41.9) Unspecified Anxiety Disorder  Psychosocial / Contextual Factors: school stress, moving out, dog passed away  PROMIS (reviewed every 90 days): 40    Referral / Collaboration:  Referral to another professional/service is not indicated at this time.    Anticipated number of session for this episode of care: 6-9 sessions  Anticipation frequency of session: Monthly  Anticipated Duration of each session: 38-52 minutes  Treatment plan will be reviewed in 90 days or when goals have been changed.       MeasurableTreatment Goal(s) related to diagnosis / functional impairment(s)  Goal 1: Patient will experience a reduction in anxious symptoms, along with a corresponding increase in relaxed emotional state.    I will know I've met my goal when anxious worries/fears decrease.      Objective #A (Patient Action)    Patient will use cognitive strategies identified in therapy to challenge anxious thoughts.  Status: Continued - Date(s):4/12/23     Intervention(s)  Therapist will utilize CBT and ACT to help pt challenge anxious thoughts and reduce intensity/duration of anxious distress.      Patient has reviewed and agreed to the above plan.        Ligia Garcia, SEVIN, [facility], [facility]  May 3, 2023

## 2023-05-17 ENCOUNTER — MYC REFILL (OUTPATIENT)
Dept: FAMILY MEDICINE | Facility: CLINIC | Age: 18
End: 2023-05-17
Payer: COMMERCIAL

## 2023-05-17 DIAGNOSIS — F50.819 BINGE EATING DISORDER: ICD-10-CM

## 2023-05-19 RX ORDER — LISDEXAMFETAMINE DIMESYLATE 40 MG/1
40 CAPSULE ORAL EVERY MORNING
Qty: 30 CAPSULE | Refills: 0 | OUTPATIENT
Start: 2023-05-19

## 2023-05-19 NOTE — TELEPHONE ENCOUNTER
Routing refill request to provider for review/approval because:  Drug not on the [facility] refill protocol     Last Written Prescription Date: 4/17/23  Last Fill Quantity: 30,  # refills: 0   Last office visit: 9/22/2022 ; last virtual visit: 12/19/2022 with prescribing provider

## 2023-05-19 NOTE — TELEPHONE ENCOUNTER
Please route to original prescribing provider. She is being followed in weight loss clinic for this medication. Thanks!

## 2023-05-22 RX ORDER — LISDEXAMFETAMINE DIMESYLATE 40 MG/1
40 CAPSULE ORAL EVERY MORNING
Qty: 14 CAPSULE | Refills: 0 | Status: SHIPPED | OUTPATIENT
Start: 2023-05-22 | End: 2023-07-12

## 2023-06-11 ENCOUNTER — MYC MEDICAL ADVICE (OUTPATIENT)
Dept: ENDOCRINOLOGY | Facility: CLINIC | Age: 18
End: 2023-06-11
Payer: COMMERCIAL

## 2023-06-11 DIAGNOSIS — E06.3 HASHIMOTO'S THYROIDITIS: Primary | ICD-10-CM

## 2023-07-10 ENCOUNTER — VIRTUAL VISIT (OUTPATIENT)
Dept: SURGERY | Facility: CLINIC | Age: 18
End: 2023-07-10
Payer: COMMERCIAL

## 2023-07-10 DIAGNOSIS — E66.09 CLASS 2 OBESITY DUE TO EXCESS CALORIES WITHOUT SERIOUS COMORBIDITY WITH BODY MASS INDEX (BMI) OF 39.0 TO 39.9 IN ADULT: Primary | ICD-10-CM

## 2023-07-10 DIAGNOSIS — F50.819 BINGE EATING DISORDER: ICD-10-CM

## 2023-07-10 DIAGNOSIS — E66.812 CLASS 2 OBESITY DUE TO EXCESS CALORIES WITHOUT SERIOUS COMORBIDITY WITH BODY MASS INDEX (BMI) OF 39.0 TO 39.9 IN ADULT: Primary | ICD-10-CM

## 2023-07-10 DIAGNOSIS — Z71.3 NUTRITIONAL COUNSELING: ICD-10-CM

## 2023-07-10 PROCEDURE — 97803 MED NUTRITION INDIV SUBSEQ: CPT | Mod: VID | Performed by: DIETITIAN, REGISTERED

## 2023-07-10 NOTE — PROGRESS NOTES
Mirna Carl is a 18 year old who is being evaluated via a billable video visit.    How would you like to obtain your AVS? MyChart  If the video visit is dropped, the invitation should be resent by: Send to e-mail at: [email or web address]  Will anyone else be joining your video visit? No        Medical  Weight Loss Follow-Up Diet Evaluation  Assessment:  Mirna is presenting today for a follow up weight management nutrition consultation.  This patient has had an initial appointment and was referred by Dr. Crowe for MNT as treatment for Obesity   Weight loss medication: Vvyanse.   Pt's weight is 215 lbs  Initial weight: 236.6 lbs  Weight change: 21.6 lbs (down)          No data to display              BMI: There is no height or weight on file to calculate BMI.  Patient weight not recorded    Estimated RMR ([place]-St Jeor equation):   1705 kcals x 1.3 (light active) = 2217 kcals (for weight maintenance)     Recommended Protein Intake: 60-80 grams of protein/day  Patient Active Problem List:  Patient Active Problem List   Diagnosis     Anxiety disorder     Hashimoto's thyroiditis     Moderate episode of recurrent major depressive disorder (H)     Seasonal allergic rhinitis     Binge eating disorder     Progress on goals from last visit: Has been off of the Vyvanse for last couple of months due to not getting in to renew the prescription, hoping to get the prescription renewed this week after appt with Dr Crowe.     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 Food and/or nutrient delivery: balanced  meals, adequate protein   2. Nutrition education: none   3. Nutrition counseling: provided support and encouragement    Monitoring/Evaluation:    Goals:  1. Continue to focus on protein first, aiming for 60-80 grams of protein/day.    2. Continue to focus on adequate hydration, especially water.     Patient to follow up in 2 day(s) with bariatrician and 1 month(s) with RD        Video-Visit Details    Type of service:  Video Visit    Video Start Time (time video started): 12:23 pm     Video End Time (time video stopped): 12:34 pm     Originating Location (pt. Location): Home        Distant Location (provider location):  Off-site    Mode of Communication:  Video Conference via [facility]    Physician has received verbal consent for a Video Visit from the patient? Yes      Lucero Cannon, FAUSTO

## 2023-07-10 NOTE — LETTER
7/10/2023         RE: Mirna Carl  [address] [id number]        Dear Colleague,    Thank you for referring your patient, Mirna Carl, to the [facility] AND [facility] [place]. Please see a copy of my visit note below.    Mirna Carl is a 18 year old who is being evaluated via a billable video visit.    How would you like to obtain your AVS? MyChart  If the video visit is dropped, the invitation should be resent by: Send to e-mail at: [email or web address]  Will anyone else be joining your video visit? No        Medical  Weight Loss Follow-Up Diet Evaluation  Assessment:  Mirna is presenting today for a follow up weight management nutrition consultation.  This patient has had an initial appointment and was referred by Dr. Crowe for MNT as treatment for Obesity   Weight loss medication: Vvyanse.   Pt's weight is 215 lbs  Initial weight: 236.6 lbs  Weight change: 21.6 lbs (down)          No data to display              BMI: There is no height or weight on file to calculate BMI.  Patient weight not recorded    Estimated RMR ([place]-St Jeor equation):   1705 kcals x 1.3 (light active) = 2217 kcals (for weight maintenance)     Recommended Protein Intake: 60-80 grams of protein/day  Patient Active Problem List:  Patient Active Problem List   Diagnosis     Anxiety disorder     Hashimoto's thyroiditis     Moderate episode of recurrent major depressive disorder (H)     Seasonal allergic rhinitis     Binge eating disorder     Progress on goals from last visit: Has been off of the Vyvanse for last couple of months due to not getting in to renew the prescription, hoping to get the prescription renewed this week after appt with Dr Crowe.     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 Food and/or nutrient delivery: balanced meals, adequate protein   2. Nutrition education: none   3. Nutrition counseling: provided support and encouragement    Monitoring/Evaluation:    Goals:  1. Continue to focus on protein first, aiming for 60-80 grams of protein/day.    2. Continue to focus on adequate hydration, especially water.     Patient to follow up in 2 day(s) with bariatrician and 1 month(s) with RD        Video-Visit Details    Type of service:  Video Visit    Video Start Time (time video started): 12:23 pm     Video End Time (time video stopped): 12:34 pm     Originating Location (pt. Location): Home        Distant Location (provider location):  Off-site    Mode of Communication:  Video Conference via [facility]    Physician has received verbal consent for a Video Visit from the patient? Yes      Lucero Cannon RD            Again, thank you for allowing me to participate in the care of your patient.        Sincerely,        Lucero Cannon RD

## 2023-07-12 ENCOUNTER — OFFICE VISIT (OUTPATIENT)
Dept: FAMILY MEDICINE | Facility: CLINIC | Age: 18
End: 2023-07-12
Payer: COMMERCIAL

## 2023-07-12 ENCOUNTER — VIRTUAL VISIT (OUTPATIENT)
Dept: BEHAVIORAL HEALTH | Facility: CLINIC | Age: 18
End: 2023-07-12
Payer: COMMERCIAL

## 2023-07-12 VITALS
DIASTOLIC BLOOD PRESSURE: 67 MMHG | BODY MASS INDEX: 39.32 KG/M2 | WEIGHT: 215 LBS | HEART RATE: 68 BPM | SYSTOLIC BLOOD PRESSURE: 101 MMHG | OXYGEN SATURATION: 97 %

## 2023-07-12 DIAGNOSIS — F41.9 ANXIETY DISORDER, UNSPECIFIED TYPE: Primary | ICD-10-CM

## 2023-07-12 DIAGNOSIS — F90.2 ATTENTION DEFICIT HYPERACTIVITY DISORDER (ADHD), COMBINED TYPE: ICD-10-CM

## 2023-07-12 DIAGNOSIS — E66.813 CLASS 3 SEVERE OBESITY WITH BODY MASS INDEX (BMI) OF 40.0 TO 44.9 IN ADULT, UNSPECIFIED OBESITY TYPE, UNSPECIFIED WHETHER SERIOUS COMORBIDITY PRESENT (H): Primary | ICD-10-CM

## 2023-07-12 DIAGNOSIS — F33.40 RECURRENT MAJOR DEPRESSIVE DISORDER, IN REMISSION (H): ICD-10-CM

## 2023-07-12 DIAGNOSIS — F50.819 BINGE EATING DISORDER: ICD-10-CM

## 2023-07-12 DIAGNOSIS — E66.01 CLASS 3 SEVERE OBESITY WITH BODY MASS INDEX (BMI) OF 40.0 TO 44.9 IN ADULT, UNSPECIFIED OBESITY TYPE, UNSPECIFIED WHETHER SERIOUS COMORBIDITY PRESENT (H): Primary | ICD-10-CM

## 2023-07-12 PROCEDURE — 90834 PSYTX W PT 45 MINUTES: CPT | Mod: VID | Performed by: SOCIAL WORKER

## 2023-07-12 PROCEDURE — 99213 OFFICE O/P EST LOW 20 MIN: CPT | Performed by: FAMILY MEDICINE

## 2023-07-12 RX ORDER — LISDEXAMFETAMINE DIMESYLATE 20 MG/1
20 CAPSULE ORAL EVERY MORNING
Qty: 30 CAPSULE | Refills: 0 | Status: SHIPPED | OUTPATIENT
Start: 2023-07-12 | End: 2023-09-16

## 2023-07-12 ASSESSMENT — PATIENT HEALTH QUESTIONNAIRE - PHQ9
10. IF YOU CHECKED OFF ANY PROBLEMS, HOW DIFFICULT HAVE THESE PROBLEMS MADE IT FOR YOU TO DO YOUR WORK, TAKE CARE OF THINGS AT HOME, OR GET ALONG WITH OTHER PEOPLE: NOT DIFFICULT AT ALL
SUM OF ALL RESPONSES TO PHQ QUESTIONS 1-9: 0
10. IF YOU CHECKED OFF ANY PROBLEMS, HOW DIFFICULT HAVE THESE PROBLEMS MADE IT FOR YOU TO DO YOUR WORK, TAKE CARE OF THINGS AT HOME, OR GET ALONG WITH OTHER PEOPLE: NOT DIFFICULT AT ALL
SUM OF ALL RESPONSES TO PHQ QUESTIONS 1-9: 0

## 2023-07-12 ASSESSMENT — ANXIETY QUESTIONNAIRES
4. TROUBLE RELAXING: NOT AT ALL
1. FEELING NERVOUS, ANXIOUS, OR ON EDGE: NOT AT ALL
5. BEING SO RESTLESS THAT IT IS HARD TO SIT STILL: NOT AT ALL
2. NOT BEING ABLE TO STOP OR CONTROL WORRYING: NOT AT ALL
GAD7 TOTAL SCORE: 0
6. BECOMING EASILY ANNOYED OR IRRITABLE: NOT AT ALL
GAD7 TOTAL SCORE: 0
3. WORRYING TOO MUCH ABOUT DIFFERENT THINGS: NOT AT ALL
7. FEELING AFRAID AS IF SOMETHING AWFUL MIGHT HAPPEN: NOT AT ALL
IF YOU CHECKED OFF ANY PROBLEMS ON THIS QUESTIONNAIRE, HOW DIFFICULT HAVE THESE PROBLEMS MADE IT FOR YOU TO DO YOUR WORK, TAKE CARE OF THINGS AT HOME, OR GET ALONG WITH OTHER PEOPLE: NOT DIFFICULT AT ALL

## 2023-07-12 NOTE — PROGRESS NOTES
[facility]: Integrated Behavioral Health  July 12, 2023      Behavioral Health Clinician Progress Note    Patient Name: Mirna Carl           Service Type:  Individual      Service Location:   [facility] / Email (patient reached)      Session Start Time: 9:00a  Session End Time:  9:45a      Session Length: 38 - 52      Attendees: Client     Service Modality:  Video Visit:      Provider verified identity through the following two step process.  Patient provided:  Patient is known previously to provider    Telemedicine Visit: The patient's condition can be safely assessed and treated via synchronous audio and visual telemedicine encounter.      Reason for Telemedicine Visit: Patient has requested telehealth visit    Originating Site (Patient Location): Patient's home    Distant Site (Provider Location): Provider Remote Setting- Home Office    Consent:  The patient/guardian has verbally consented to: the potential risks and benefits of telemedicine (video visit) versus in person care; bill my insurance or make self-payment for services provided; and responsibility for payment of non-covered services.     Patient would like the video invitation sent by:  My Chart    Mode of Communication:  Video Conference via [facility]    Distant Location (Provider):  Off-site    As the provider I attest to compliance with applicable laws and regulations related to telemedicine.    Visit Activities (Refresh list every visit): [facility] Only    Diagnostic Assessment Date: 12/14/22  Treatment Plan Review Date: 10/12/23  See Flowsheets for today's PHQ-9 and MATT-7 results  Previous PHQ-9:       3/7/2023    12:20 PM 4/12/2023     9:55 AM 7/12/2023     6:41 AM   PHQ-9 SCORE   PHQ-9 Total Score MyChart 0 0 0   PHQ-9 Total Score 0 0 0    0     Previous MATT-7:       10/28/2022     4:19 PM 12/14/2022    11:09 AM 7/12/2023     6:44 AM   MATT-7 SCORE   Total Score  0 (minimal anxiety) 0 (minimal anxiety)    Total Score 13 0 0       DATA  Extended Session (60+ minutes): No  Interactive Complexity: No  Crisis: No  [facility] Patient: No    Treatment Objective(s) Addressed in This Session:  Target Behavior(s): anxiety    Anxiety: will experience a reduction in anxiety    Current Stressors / Issues:  Summer is going well overall.  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   Got a letter and is on academic probation though isn't surprised or worried.  Plans to retake the classes from last spring that she didn't pass.  Does have an email into her advisor as well.   Hasn't been going home as much other than to do laundry.  Denies any specific stressors with family at this time.  There have been some changes with management at work and is being given more opportunities.   Has asked to be considered for another position but hasn't heard back regarding this.      Progress on Treatment Objective(s) / Homework:  Stable - ACTION (Actively working towards change); Intervened by reinforcing change plan / affirming steps taken    Motivational Interviewing    MI Intervention: Expressed Empathy/Understanding, Permission to raise concern or advise, Open-ended questions and Reflections: simple and complex     Change Talk Expressed by the Patient: Desire to change Committment to change    Provider Response to Change Talk: E - Evoked more info from patient about behavior change, A - Affirmed patient's thoughts, decisions, or attempts at behavior change, R - Reflected patient's change talk and S - Summarized patient's change talk statements      Care Plan review completed: No    Medication Review:  Changes to psychiatric medications, see updated Medication List in EPIC.      Medication Compliance:  Yes    Changes in Health Issues:   None reported    Chemical Use Review:   Substance Use: Chemical use reviewed, no active concerns identified      Tobacco Use: No current tobacco use.       Assessment: Current Emotional / Mental Status (status of significant symptoms):  Risk status (Self / Other harm or suicidal ideation)  Patient denies a history of suicidal ideation, suicide attempts, self-injurious behavior, homicidal ideation, homicidal behavior and and other safety concerns  Patient denies current fears or concerns for personal safety.  Patient denies current or recent suicidal ideation or behaviors.  Patient denies current or recent homicidal ideation or behaviors.  Patient denies current or recent self injurious behavior or ideation.  Patient denies other safety concerns.  A safety and risk management plan has not been developed at this time, however patient was encouraged to call [place] [phone] should there be a change in any of these risk factors.    Appearance:   Appropriate   Eye Contact:   Good   Psychomotor Behavior: Normal   Attitude:   Cooperative   Orientation:   All  Speech   Rate / Production: Normal/ Responsive Normal    Volume:  Normal   Mood:    Normal  Affect:    Appropriate   Thought Content:  Clear   Thought Form:  Coherent  Logical   Insight:    Good     Diagnoses:  1. Anxiety disorder, unspecified type    2. Recurrent major depressive disorder, in remission (H)        Collateral Reports Completed:  Not Applicable    Plan: (Homework, other):  Patient was given information about behavioral services and encouraged to schedule a follow up appointment with the clinic [facility] as needed.  She was also given information about mental health symptoms and treatment options .  CD Recommendations: No indications of CD issues.  KVNG Torres, [facility]                                              Individual Treatment Plan    Patient's Name: Mirna Carl  YOB: 2005    Date of Creation: 1/11/23   Date Treatment Plan Last Reviewed/Revised: 7/12/23    DSM5 Diagnoses: 296.30 (F33.9) Major Depressive Disorder, Recurrent Episode, Unspecified _ and With anxious  distress or 300.00 (F41.9) Unspecified Anxiety Disorder  Psychosocial / Contextual Factors: school stress, moving out, dog passed away, changes at work  PROMIS (reviewed every 90 days): 40    Referral / Collaboration:  Referral to another professional/service is not indicated at this time.    Anticipated number of session for this episode of care: 6-9 sessions  Anticipation frequency of session: Monthly  Anticipated Duration of each session: 38-52 minutes  Treatment plan will be reviewed in 90 days or when goals have been changed.       MeasurableTreatment Goal(s) related to diagnosis / functional impairment(s)  Goal 1: Patient will experience a reduction in anxious symptoms, along with a corresponding increase in relaxed emotional state.    I will know I've met my goal when anxious worries/fears decrease.      Objective #A (Patient Action)    Patient will use cognitive strategies identified in therapy to challenge anxious thoughts.  Status: Continued - Date(s):7/12/23     Intervention(s)  Therapist will utilize CBT and ACT to help pt challenge anxious thoughts and reduce intensity/duration of anxious distress.      Patient has reviewed and agreed to the above plan.        Ligia Garcia, ESVIN, LICSW, [facility]  July 12, 2023

## 2023-07-12 NOTE — PROGRESS NOTES
Assessment & Plan   Problem List Items Addressed This Visit        Behavioral    Binge eating disorder    Relevant Medications    lisdexamfetamine (VYVANSE) 20 MG capsule    liraglutide - Weight Management (SAXENDA) 18 MG/3ML pen    Attention deficit hyperactivity disorder (ADHD), combined type    Relevant Medications    lisdexamfetamine (VYVANSE) 20 MG capsule    liraglutide - Weight Management (SAXENDA) 18 MG/3ML pen   Other Visit Diagnoses     Class 3 severe obesity with body mass index (BMI) of 40.0 to 44.9 in adult, unspecified obesity type, unspecified whether serious comorbidity present (H)    -  Primary    Relevant Medications    lisdexamfetamine (VYVANSE) 20 MG capsule    liraglutide - Weight Management (SAXENDA) 18 MG/3ML pen    insulin pen needle (32G X 4 MM) 32G X 4 MM miscellaneous         The patient wants to restart the medication at a little bit lower dose and see how she does with that.  I prescribed Vyvanse at the 20 mg dose.  She is going to talk to her primary provider on Friday as well as to whether she would like to  prescribing that medication for the indication of the ADHD.  We had talked previously about a GLP-1 agonist as an option but it was going to be too expensive last year.  It does look like it might be covered by insurance this year and so I sent in a prescription for Saxenda and we will see if that is an option for her.  I do think this medication would assist her in achieving a healthier weight with a current BMI just under 40.      KAITLYNN SANTOS MD  [facility] CampusELIZABETH Boone is a 18 year old who presents today for follow-up regarding medical weight management.  The patient has been doing very well and found the Vyvanse to be quite helpful.  She was tolerating the 40 mg dose well and finding that it definitely helped reduce binge eating episodes.  The patient is down 21 pounds since last fall when she was started on the medication.   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  She really has not been having any episodes of binge eating and also feels she has a good amount of energy.  She was in the interim diagnosed with attention deficit disorder as well and believes that the medication is really helping with her ability to organize and focus.  The patient has been out of the medication for the past month.  She has noticed a decrease in energy and a little more difficulty focusing.  However, because of how busy she is she really has not resumed any binge eating behavior.  She is making healthy choices and overall has no specific complaints or concerns.  follow up vyvanse         Silvana data to display              History of Present Illness       Reason for visit:  Med Refill    She eats 0-1 servings of fruits and vegetables daily.She consumes 1 sweetened beverage(s) daily.She exercises with enough effort to increase her heart rate 30 to 60 minutes per day.  She exercises with enough effort to increase her heart rate 5 days per week.   She is taking medications regularly.           Objective    /67 (BP Location: Left arm, Patient Position: Left side, Cuff Size: Adult Large)   Pulse 68   Wt 97.5 kg (215 lb)   LMP 07/09/2023 (Exact Date)   SpO2 97%   BMI 39.32 kg/m    Body mass index is 39.32 kg/m .  Physical Exam   GENERAL: healthy, alert and no distress

## 2023-07-14 ENCOUNTER — OFFICE VISIT (OUTPATIENT)
Dept: FAMILY MEDICINE | Facility: CLINIC | Age: 18
End: 2023-07-14
Payer: COMMERCIAL

## 2023-07-14 VITALS
BODY MASS INDEX: 39.77 KG/M2 | HEIGHT: 62 IN | DIASTOLIC BLOOD PRESSURE: 64 MMHG | TEMPERATURE: 98.2 F | SYSTOLIC BLOOD PRESSURE: 92 MMHG | HEART RATE: 76 BPM | OXYGEN SATURATION: 96 % | WEIGHT: 216.1 LBS

## 2023-07-14 DIAGNOSIS — F90.2 ATTENTION DEFICIT HYPERACTIVITY DISORDER (ADHD), COMBINED TYPE: ICD-10-CM

## 2023-07-14 DIAGNOSIS — J30.2 SEASONAL ALLERGIC RHINITIS, UNSPECIFIED TRIGGER: ICD-10-CM

## 2023-07-14 DIAGNOSIS — F41.1 GENERALIZED ANXIETY DISORDER: ICD-10-CM

## 2023-07-14 DIAGNOSIS — F50.819 BINGE EATING DISORDER: Primary | ICD-10-CM

## 2023-07-14 DIAGNOSIS — R11.0 NAUSEA: ICD-10-CM

## 2023-07-14 PROCEDURE — 99213 OFFICE O/P EST LOW 20 MIN: CPT | Performed by: PEDIATRICS

## 2023-07-14 RX ORDER — LISDEXAMFETAMINE DIMESYLATE 30 MG/1
30 CAPSULE ORAL EVERY MORNING
Qty: 30 CAPSULE | Refills: 0 | Status: SHIPPED | OUTPATIENT
Start: 2023-08-14 | End: 2023-11-02

## 2023-07-14 RX ORDER — FLUOXETINE 40 MG/1
40 CAPSULE ORAL DAILY
Qty: 90 CAPSULE | Refills: 3 | Status: SHIPPED | OUTPATIENT
Start: 2023-07-14 | End: 2024-06-24

## 2023-07-14 RX ORDER — CETIRIZINE HYDROCHLORIDE 10 MG/1
10 TABLET ORAL DAILY
Qty: 90 TABLET | Refills: 3 | Status: SHIPPED | OUTPATIENT
Start: 2023-07-14 | End: 2024-08-19

## 2023-07-14 ASSESSMENT — ANXIETY QUESTIONNAIRES: GAD7 TOTAL SCORE: 0

## 2023-07-14 ASSESSMENT — PATIENT HEALTH QUESTIONNAIRE - PHQ9
SUM OF ALL RESPONSES TO PHQ QUESTIONS 1-9: 0
10. IF YOU CHECKED OFF ANY PROBLEMS, HOW DIFFICULT HAVE THESE PROBLEMS MADE IT FOR YOU TO DO YOUR WORK, TAKE CARE OF THINGS AT HOME, OR GET ALONG WITH OTHER PEOPLE: NOT DIFFICULT AT ALL

## 2023-07-14 ASSESSMENT — ENCOUNTER SYMPTOMS: NERVOUS/ANXIOUS: 1

## 2023-07-14 NOTE — PROGRESS NOTES
Assessment & Plan     (F50.81) Binge eating disorder  (primary encounter diagnosis)    Plan: lisdexamfetamine (VYVANSE) 30 MG capsule            (J30.2) Seasonal allergic rhinitis, unspecified trigger    Plan: cetirizine (ZYRTEC) 10 MG tablet       (F41.1) Generalized anxiety disorder    Plan: FLUoxetine (PROZAC) 40 MG capsule      (F90.2) Attention deficit hyperactivity disorder (ADHD), combined type    Plan: lisdexamfetamine (VYVANSE) 30 MG capsule        Plan:    Continue current fluoxetine 40 mg once daily.  Refills provided.  Will plan to transfer her Vyvanse prescription to our clinic especially since she is now taking for ADHD in addition to weight loss management.  She still plans to follow with Dr. Crowe for weight loss management.  Since she has been off of her medication for a few months Dr. Crowe sent the 20 mg dose and had plans to titrate back up to 40 mg.  Patient is comfortable with increasing to 30 mg next month.  I reviewed clinic policies on controlled substances.  We will plan to have her sign a controlled substance agreement at her next visit.  PDMP reviewed.  Follow-up in clinic in about 4 months for ADHD, sooner if needed.    Kaylynn Ferraro MD on 7/14/2023 at 8:57 AM      Corky Boone is a 18 year old, presenting for the following health issues: here for med check and follow up on anxiety  Recheck Medication and Anxiety        7/14/2023     8:06 AM   Additional Questions   Roomed by brianna selby   Accompanied by self     Anxiety    History of Present Illness       Reason for visit:  Med Refill    She eats 0-1 servings of fruits and vegetables daily.She consumes 1 sweetened beverage(s) daily.She exercises with enough effort to increase her heart rate 30 to 60 minutes per day.  She exercises with enough effort to increase her heart rate 5 days per week.   She is taking medications regularly.    Today's PHQ-9         PHQ-9 Total Score: 0    PHQ-9 Q9 Thoughts of better off dead/self-harm  "past 2 weeks :   Not at all    How difficult have these problems made it for you to do your work, take care of things at home, or get along with other people: Not difficult at all  Today's MATT-7 Score: 0         Here today for follow up.  Is seeing Emma Crowe in [place].  Will restart her Vyvanse.  In October was diagnosed with ADHD as well.  She thinks that the control of the Vyvanse could be switched here.  Is restarting 20 mg and she sent a 30 day supply and was planning to go back to 30 mg after that.  In the past had been a    When she was on the Vyvanse she noticed it was easier to pay attention and getting out of the house to go to classes and motivation was better.  Stayed in library to study.      Will go back in the fall full time.  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  Will think about grad school.  [facility].        Review of Systems   Psychiatric/Behavioral: The patient is nervous/anxious.             Objective    BP 92/64 (BP Location: Right arm, Patient Position: Sitting)   Pulse 76   Temp 98.2  F (36.8  C)   Ht 1.575 m (5' 2\")   Wt 98 kg (216 lb 1.6 oz)   LMP 07/09/2023 (Exact Date)   SpO2 96%   BMI 39.53 kg/m    Body mass index is 39.53 kg/m .     Physical Exam     General:  Alert and oriented, No acute distress.    Respiratory:  Lungs clear to auscultation bilaterally.  Equal air entry.  Symmetrical chest expansion.  No wheezing.    Cardiovascular:  S1 and S2 with regular rate and rhythm.  No murmurs.      Neurologic:  No focal deficits.        10/28/2022     4:19 PM 12/14/2022    11:09 AM 7/12/2023     6:44 AM   MATT-7 SCORE   Total Score  0 (minimal anxiety) 0 (minimal anxiety)   Total Score 13 0 0           3/7/2023    12:20 PM 4/12/2023     9:55 AM 7/12/2023     6:41 AM   PHQ   PHQ-9 Total Score 0 0 0    0   Q9: Thoughts of better off dead/self-harm past 2 weeks Not at all Not at all Not at all    Not at all       "

## 2023-09-18 ENCOUNTER — VIRTUAL VISIT (OUTPATIENT)
Dept: BEHAVIORAL HEALTH | Facility: CLINIC | Age: 18
End: 2023-09-18
Payer: COMMERCIAL

## 2023-09-18 DIAGNOSIS — F33.40 RECURRENT MAJOR DEPRESSIVE DISORDER, IN REMISSION (H): ICD-10-CM

## 2023-09-18 DIAGNOSIS — F41.9 ANXIETY DISORDER, UNSPECIFIED TYPE: Primary | ICD-10-CM

## 2023-09-18 PROCEDURE — 90832 PSYTX W PT 30 MINUTES: CPT | Mod: VID | Performed by: SOCIAL WORKER

## 2023-09-18 NOTE — PROGRESS NOTES
[facility]: Integrated Behavioral Health  September 18, 2023      Behavioral Health Clinician Progress Note    Patient Name: Mirna Carl           Service Type:  Individual      Service Location:   [facility] / Email (patient reached)      Session Start Time: 9:06a  Session End Time:  9:30a      Session Length: 16 - 37      Attendees: Client     Service Modality:  Video Visit:      Provider verified identity through the following two step process.  Patient provided:  Patient is known previously to provider    Telemedicine Visit: The patient's condition can be safely assessed and treated via synchronous audio and visual telemedicine encounter.      Reason for Telemedicine Visit: Patient has requested telehealth visit    Originating Site (Patient Location): Patient's home    Distant Site (Provider Location): Provider Remote Setting- Home Office    Consent:  The patient/guardian has verbally consented to: the potential risks and benefits of telemedicine (video visit) versus in person care; bill my insurance or make self-payment for services provided; and responsibility for payment of non-covered services.     Patient would like the video invitation sent by:  My Chart    Mode of Communication:  Video Conference via [facility]    Distant Location (Provider):  Off-site    As the provider I attest to compliance with applicable laws and regulations related to telemedicine.    Visit Activities (Refresh list every visit): [facility] Only    Diagnostic Assessment Date: 12/14/22  Treatment Plan Review Date: 10/12/23  See Flowsheets for today's PHQ-9 and MATT-7 results  Previous PHQ-9:       4/12/2023     9:55 AM 7/12/2023     6:41 AM 9/18/2023     8:57 AM   PHQ-9 SCORE   PHQ-9 Total Score MyChart 0 0 0   PHQ-9 Total Score 0 0    0 0     Previous MATT-7:       10/28/2022     4:19 PM 12/14/2022    11:09 AM 7/12/2023     6:44 AM   MATT-7 SCORE   Total Score  0 (minimal anxiety) 0 (minimal anxiety)    Total Score 13 0 0       DATA  Extended Session (60+ minutes): No  Interactive Complexity: No  Crisis: No  H Patient: No    Treatment Objective(s) Addressed in This Session:  Target Behavior(s):  anxiety    Anxiety: will experience a reduction in anxiety    Current Stressors / Issues:  Shares that she is doing well.  Moved back home last month after staying in a home in camp for the summer.  Pt shares that so far things are going fine being back home.  Shares that summer camp was very busy.  Continues working part time and is back in school and has a full schedule.  All classes are in-person and is liking it that way.    Pt shares that she has also been trying to balance school and work with doing some fun things with friends.    Reports that overall mood is good and denies current depressive symptoms.  Anxiety is minimal and primarily situational.        Progress on Treatment Objective(s) / Homework:  Stable - ACTION (Actively working towards change); Intervened by reinforcing change plan / affirming steps taken    Motivational Interviewing    MI Intervention: Expressed Empathy/Understanding, Permission to raise concern or advise, Open-ended questions and Reflections: simple and complex     Change Talk Expressed by the Patient: Desire to change Committment to change    Provider Response to Change Talk: E - Evoked more info from patient about behavior change, A - Affirmed patient's thoughts, decisions, or attempts at behavior change, R - Reflected patient's change talk and S - Summarized patient's change talk statements      Care Plan review completed: No    Medication Review:  Changes to psychiatric medications, see updated Medication List in EPIC.      Medication Compliance:  Yes    Changes in Health Issues:   None reported    Chemical Use Review:   Substance Use: Chemical use reviewed, no active concerns identified      Tobacco Use: No current tobacco use.      Assessment: Current Emotional / Mental Status  (status of significant symptoms):  Risk status (Self / Other harm or suicidal ideation)  Patient denies a history of suicidal ideation, suicide attempts, self-injurious behavior, homicidal ideation, homicidal behavior and and other safety concerns  Patient denies current fears or concerns for personal safety.  Patient denies current or recent suicidal ideation or behaviors.  Patient denies current or recent homicidal ideation or behaviors.  Patient denies current or recent self injurious behavior or ideation.  Patient denies other safety concerns.  A safety and risk management plan has not been developed at this time, however patient was encouraged to call [place] [phone] should there be a change in any of these risk factors.    Appearance:   Appropriate   Eye Contact:   Good   Psychomotor Behavior: Normal   Attitude:   Cooperative   Orientation:   All  Speech   Rate / Production: Normal/ Responsive Normal    Volume:  Normal   Mood:    Normal  Affect:    Appropriate   Thought Content:  Clear   Thought Form:  Coherent  Logical   Insight:    Good     Diagnoses:  1. Anxiety disorder, unspecified type    2. Recurrent major depressive disorder, in remission (H)          Collateral Reports Completed:  Not Applicable    Plan: (Homework, other):  Patient was given information about behavioral services and encouraged to schedule a follow up appointment with the clinic [facility] in 2 weeks.  She was also given information about mental health symptoms and treatment options .  CD Recommendations: No indications of CD issues.   Ligia Garcia, [facility], [facility]                                              Individual Treatment Plan    Patient's Name: Mirna Carl  YOB: 2005    Date of Creation: 1/11/23   Date Treatment Plan Last Reviewed/Revised: 7/12/23    DSM5 Diagnoses: 296.30 (F33.9) Major Depressive Disorder, Recurrent Episode, Unspecified _ and With anxious distress or 300.00 (F41.9) Unspecified Anxiety  Disorder  Psychosocial / Contextual Factors: school stress, moving out, dog passed away, changes at work  PROMIS (reviewed every 90 days): 40    Referral / Collaboration:  Referral to another professional/service is not indicated at this time.    Anticipated number of session for this episode of care: 6-9 sessions  Anticipation frequency of session: Monthly  Anticipated Duration of each session: 38-52 minutes  Treatment plan will be reviewed in 90 days or when goals have been changed.       MeasurableTreatment Goal(s) related to diagnosis / functional impairment(s)  Goal 1: Patient will experience a reduction in anxious symptoms, along with a corresponding increase in relaxed emotional state.    I will know I've met my goal when anxious worries/fears decrease.      Objective #A (Patient Action)    Patient will use cognitive strategies identified in therapy to challenge anxious thoughts.  Status: Continued - Date(s):7/12/23     Intervention(s)  Therapist will utilize CBT and ACT to help pt challenge anxious thoughts and reduce intensity/duration of anxious distress.      Patient has reviewed and agreed to the above plan.        Ligia Garcia, ESVIN, LICSW, [facility]  September 18, 2023

## 2023-10-06 ENCOUNTER — VIRTUAL VISIT (OUTPATIENT)
Dept: BEHAVIORAL HEALTH | Facility: CLINIC | Age: 18
End: 2023-10-06
Payer: COMMERCIAL

## 2023-10-06 DIAGNOSIS — F41.9 ANXIETY DISORDER, UNSPECIFIED TYPE: Primary | ICD-10-CM

## 2023-10-06 DIAGNOSIS — F33.40 RECURRENT MAJOR DEPRESSIVE DISORDER, IN REMISSION (H): ICD-10-CM

## 2023-10-06 PROCEDURE — 90832 PSYTX W PT 30 MINUTES: CPT | Mod: VID | Performed by: SOCIAL WORKER

## 2023-10-06 NOTE — PROGRESS NOTES
[facility] Primary Care: Integrated Behavioral Health  October 6, 2023        Behavioral Health Clinician Progress Note    Patient Name: Mirna Carl           Service Type:  Individual      Service Location:   [facility] / Email (patient reached)      Session Start Time: 8:05a  Session End Time:  8:39a      Session Length: 16 - 37      Attendees: Client     Service Modality:  Video Visit:      Provider verified identity through the following two step process.  Patient provided:  Patient is known previously to provider    Telemedicine Visit: The patient's condition can be safely assessed and treated via synchronous audio and visual telemedicine encounter.      Reason for Telemedicine Visit: Patient has requested telehealth visit    Originating Site (Patient Location): Patient's home    Distant Site (Provider Location): Provider Remote Setting- Home Office    Consent:  The patient/guardian has verbally consented to: the potential risks and benefits of telemedicine (video visit) versus in person care; bill my insurance or make self-payment for services provided; and responsibility for payment of non-covered services.     Patient would like the video invitation sent by:  My Chart    Mode of Communication:  Video Conference via [facility]    Distant Location (Provider):  Off-site    As the provider I attest to compliance with applicable laws and regulations related to telemedicine.    Visit Activities (Refresh list every visit): [facility] Only    Diagnostic Assessment Date: 12/14/22  Treatment Plan Review Date: 10/12/23  See Flowsheets for today's PHQ-9 and MATT-7 results  Previous PHQ-9:       4/12/2023     9:55 AM 7/12/2023     6:41 AM 9/18/2023     8:57 AM   PHQ-9 SCORE   PHQ-9 Total Score MyChart 0 0 0   PHQ-9 Total Score 0 0    0 0     Previous MATT-7:       10/28/2022     4:19 PM 12/14/2022    11:09 AM 7/12/2023     6:44 AM   MATT-7 SCORE   Total Score  0 (minimal anxiety) 0 (minimal anxiety)    Total Score 13 0 0       DATA  Extended Session (60+ minutes): No  Interactive Complexity: No  Crisis: No  [facility] Patient: No    Treatment Objective(s) Addressed in This Session:  Target Behavior(s):  anxiety    Anxiety: will experience a reduction in anxiety    Current Stressors / Issues:  School is going well.  Enjoying classes and having in-person classes.    Work is fine.  Hours vary a bit week by week.    Things at home are fine.  Family is talking about possibly getting a new puppy.  Pt shares that everyone is on board with it though they are not rushing into it.  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  Would love to get into the [facility] [employer] office if possible.      Progress on Treatment Objective(s) / Homework:  Stable - ACTION (Actively working towards change); Intervened by reinforcing change plan / affirming steps taken    Motivational Interviewing    MI Intervention: Expressed Empathy/Understanding, Permission to raise concern or advise, Open-ended questions and Reflections: simple and complex     Change Talk Expressed by the Patient: Desire to change Committment to change    Provider Response to Change Talk: E - Evoked more info from patient about behavior change, A - Affirmed patient's thoughts, decisions, or attempts at behavior change, R - Reflected patient's change talk and S - Summarized patient's change talk statements      Care Plan review completed: No    Medication Review:  Changes to psychiatric medications, see updated Medication List in EPIC.      Medication Compliance:  Yes    Changes in Health Issues:   None reported    Chemical Use Review:   Substance Use: Chemical use reviewed, no active concerns identified      Tobacco Use: No current tobacco use.      Assessment: Current Emotional / Mental Status (status of significant symptoms):  Risk status (Self / Other harm or suicidal ideation)  Patient denies a history of suicidal ideation, suicide attempts,  self-injurious behavior, homicidal ideation, homicidal behavior and and other safety concerns  Patient denies current fears or concerns for personal safety.  Patient denies current or recent suicidal ideation or behaviors.  Patient denies current or recent homicidal ideation or behaviors.  Patient denies current or recent self injurious behavior or ideation.  Patient denies other safety concerns.  A safety and risk management plan has not been developed at this time, however patient was encouraged to call [place] [phone] should there be a change in any of these risk factors.    Appearance:   Appropriate   Eye Contact:   Good   Psychomotor Behavior: Normal   Attitude:   Cooperative   Orientation:   All  Speech   Rate / Production: Normal/ Responsive Normal    Volume:  Normal   Mood:    Normal  Affect:    Appropriate   Thought Content:  Clear   Thought Form:  Coherent  Logical   Insight:    Good     Diagnoses:  1. Anxiety disorder, unspecified type    2. Recurrent major depressive disorder, in remission (H)          Collateral Reports Completed:  Not Applicable    Plan: (Homework, other):  Patient was given information about behavioral services and encouraged to schedule a follow up appointment with the clinic [facility] in 2 weeks.  She was also given information about mental health symptoms and treatment options .  CD Recommendations: No indications of CD issues.   Ligia Garcia, [facility], [facility]                                              Individual Treatment Plan    Patient's Name: Mirna Carl  YOB: 2005    Date of Creation: 1/11/23   Date Treatment Plan Last Reviewed/Revised: 7/12/23    DSM5 Diagnoses: 296.30 (F33.9) Major Depressive Disorder, Recurrent Episode, Unspecified _ and With anxious distress or 300.00 (F41.9) Unspecified Anxiety Disorder  Psychosocial / Contextual Factors: school stress, dog passed away, changes at work  PROMIS (reviewed every 90 days):   PROMIS-10 Scores         3/22/2023    11:08 AM 7/10/2023     7:56 PM 9/18/2023     9:03 AM   PROMIS-10 Total Score w/o Sub Scores   PROMIS TOTAL - SUBSCORES 32 31 38        Referral / Collaboration:  Referral to another professional/service is not indicated at this time.    Anticipated number of session for this episode of care: 6-9 sessions  Anticipation frequency of session: Monthly  Anticipated Duration of each session: 38-52 minutes  Treatment plan will be reviewed in 90 days or when goals have been changed.       MeasurableTreatment Goal(s) related to diagnosis / functional impairment(s)  Goal 1: Patient will experience a reduction in anxious symptoms, along with a corresponding increase in relaxed emotional state.    I will know I've met my goal when anxious worries/fears decrease.      Objective #A (Patient Action)    Patient will use cognitive strategies identified in therapy to challenge anxious thoughts.  Status: Continued - Date(s):7/12/23     Intervention(s)  Therapist will utilize CBT and ACT to help pt challenge anxious thoughts and reduce intensity/duration of anxious distress.      Patient has reviewed and agreed to the above plan.        Ligia Garcia, ESVIN, LICSW, [facility]  October 9, 2023

## 2023-10-24 ENCOUNTER — VIRTUAL VISIT (OUTPATIENT)
Dept: BEHAVIORAL HEALTH | Facility: CLINIC | Age: 18
End: 2023-10-24
Payer: COMMERCIAL

## 2023-10-24 DIAGNOSIS — F41.9 ANXIETY DISORDER, UNSPECIFIED TYPE: Primary | ICD-10-CM

## 2023-10-24 DIAGNOSIS — F33.40 RECURRENT MAJOR DEPRESSIVE DISORDER, IN REMISSION (H): ICD-10-CM

## 2023-10-24 PROCEDURE — 90834 PSYTX W PT 45 MINUTES: CPT | Mod: VID | Performed by: SOCIAL WORKER

## 2023-10-24 NOTE — PROGRESS NOTES
[facility] Primary Care: Integrated Behavioral Health  October 24, 2023        Behavioral Health Clinician Progress Note    Patient Name: Mirna Carl           Service Type:  Individual      Service Location:   [facility] / Email (patient reached)      Session Start Time: 12:58p  Session End Time:  1:40p      Session Length: 38 - 52      Attendees: Client     Service Modality:  Video Visit:      Provider verified identity through the following two step process.  Patient provided:  Patient is known previously to provider    Telemedicine Visit: The patient's condition can be safely assessed and treated via synchronous audio and visual telemedicine encounter.      Reason for Telemedicine Visit: Patient has requested telehealth visit    Originating Site (Patient Location): Patient's home    Distant Site (Provider Location): Provider Remote Setting- Home Office    Consent:  The patient/guardian has verbally consented to: the potential risks and benefits of telemedicine (video visit) versus in person care; bill my insurance or make self-payment for services provided; and responsibility for payment of non-covered services.     Patient would like the video invitation sent by:  My Chart    Mode of Communication:  Video Conference via [facility]    Distant Location (Provider):  Off-site    As the provider I attest to compliance with applicable laws and regulations related to telemedicine.    Visit Activities (Refresh list every visit): [facility] Only    Diagnostic Assessment Date: 12/14/22  Treatment Plan Review Date: 1/24/24  See Flowsheets for today's PHQ-9 and MATT-7 results  Previous PHQ-9:       7/12/2023     6:41 AM 9/18/2023     8:57 AM 10/23/2023     2:58 PM   PHQ-9 SCORE   PHQ-9 Total Score MyChart 0 0 0   PHQ-9 Total Score 0    0 0 0     Previous MATT-7:       10/28/2022     4:19 PM 12/14/2022    11:09 AM 7/12/2023     6:44 AM   MATT-7 SCORE   Total Score  0 (minimal anxiety) 0 (minimal anxiety)  "  Total Score 13 0 0       DATA  Extended Session (60+ minutes): No  Interactive Complexity: No  Crisis: No  H Patient: No    Treatment Objective(s) Addressed in This Session:  Target Behavior(s):  anxiety    Anxiety: will experience a reduction in anxiety    Current Stressors / Issues:  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 Is excited about starting an internship though says it puts into perspective that school is coming to an end.   Discussed some of the classes she is currently taking.    Had a work situation recently that didn't go well though shares that her supervisor was supportive and talked about what she would do differently next time.    Family planning on going to see grandfather over Thanksgiving though pt has decided not to go.  Prefers to stay home with their dog and feels that if she has the house to herself it is \"a vacation\" for her as well.        Progress on Treatment Objective(s) / Homework:  Stable - ACTION (Actively working towards change); Intervened by reinforcing change plan / affirming steps taken    Motivational Interviewing    MI Intervention: Expressed Empathy/Understanding, Permission to raise concern or advise, Open-ended questions and Reflections: simple and complex     Change Talk Expressed by the Patient: Desire to change Committment to change    Provider Response to Change Talk: E - Evoked more info from patient about behavior change, A - Affirmed patient's thoughts, decisions, or attempts at behavior change, R - Reflected patient's change talk and S - Summarized patient's change talk statements      Care Plan review completed: Yes    Medication Review:  Changes to psychiatric medications, see updated Medication List in EPIC.      Medication Compliance:  Yes    Changes in Health Issues:   None reported    Chemical Use Review:   Substance Use: Chemical use reviewed, no active concerns identified      Tobacco Use: No " current tobacco use.      Assessment: Current Emotional / Mental Status (status of significant symptoms):  Risk status (Self / Other harm or suicidal ideation)  Patient denies a history of suicidal ideation, suicide attempts, self-injurious behavior, homicidal ideation, homicidal behavior and and other safety concerns  Patient denies current fears or concerns for personal safety.  Patient denies current or recent suicidal ideation or behaviors.  Patient denies current or recent homicidal ideation or behaviors.  Patient denies current or recent self injurious behavior or ideation.  Patient denies other safety concerns.  A safety and risk management plan has not been developed at this time, however patient was encouraged to call [place] [phone] should there be a change in any of these risk factors.    Appearance:   Appropriate   Eye Contact:   Good   Psychomotor Behavior: Normal   Attitude:   Cooperative   Orientation:   All  Speech   Rate / Production: Normal/ Responsive Normal    Volume:  Normal   Mood:    Normal  Affect:    Appropriate   Thought Content:  Clear   Thought Form:  Coherent  Logical   Insight:    Good     Diagnoses:  1. Anxiety disorder, unspecified type    2. Recurrent major depressive disorder, in remission (H24)            Collateral Reports Completed:  Not Applicable    Plan: (Homework, other):  Patient was given information about behavioral services and encouraged to schedule a follow up appointment with the clinic [facility] in 3 weeks.  She was also given information about mental health symptoms and treatment options .  CD Recommendations: No indications of CD issues.   KVNG Torres, [facility]                                              Individual Treatment Plan    Patient's Name: Mirna Carl  YOB: 2005    Date of Creation: 1/11/23   Date Treatment Plan Last Reviewed/Revised: 10/24/23    DSM5 Diagnoses: 296.30 (F33.9) Major Depressive Disorder, Recurrent Episode,  Unspecified _ and With anxious distress or 300.00 (F41.9) Unspecified Anxiety Disorder  Psychosocial / Contextual Factors: school stress, finding an internship, dog passed away, changes at work  PROMIS (reviewed every 90 days):   PROMIS-10 Scores        3/22/2023    11:08 AM 7/10/2023     7:56 PM 9/18/2023     9:03 AM   PROMIS-10 Total Score w/o Sub Scores   PROMIS TOTAL - SUBSCORES 32 31 38        Referral / Collaboration:  Referral to another professional/service is not indicated at this time.    Anticipated number of session for this episode of care: 6-9 sessions  Anticipation frequency of session: Monthly  Anticipated Duration of each session: 38-52 minutes  Treatment plan will be reviewed in 90 days or when goals have been changed.       MeasurableTreatment Goal(s) related to diagnosis / functional impairment(s)  Goal 1: Patient will experience a reduction in anxious symptoms, along with a corresponding increase in relaxed emotional state.    I will know I've met my goal when anxious worries/fears decrease.      Objective #A (Patient Action)    Patient will use cognitive strategies identified in therapy to challenge anxious thoughts.  Status: Continued - Date(s):10/24/23     Intervention(s)  Therapist will utilize CBT and ACT to help pt challenge anxious thoughts and reduce intensity/duration of anxious distress.      Patient has reviewed and agreed to the above plan.        Ligia Garcia, ESVIN, [facility], [facility]  October 24, 2023

## 2023-11-01 ASSESSMENT — ANXIETY QUESTIONNAIRES
4. TROUBLE RELAXING: NOT AT ALL
5. BEING SO RESTLESS THAT IT IS HARD TO SIT STILL: NOT AT ALL
2. NOT BEING ABLE TO STOP OR CONTROL WORRYING: NOT AT ALL
3. WORRYING TOO MUCH ABOUT DIFFERENT THINGS: NOT AT ALL
IF YOU CHECKED OFF ANY PROBLEMS ON THIS QUESTIONNAIRE, HOW DIFFICULT HAVE THESE PROBLEMS MADE IT FOR YOU TO DO YOUR WORK, TAKE CARE OF THINGS AT HOME, OR GET ALONG WITH OTHER PEOPLE: NOT DIFFICULT AT ALL
1. FEELING NERVOUS, ANXIOUS, OR ON EDGE: NOT AT ALL
GAD7 TOTAL SCORE: 0
6. BECOMING EASILY ANNOYED OR IRRITABLE: NOT AT ALL
7. FEELING AFRAID AS IF SOMETHING AWFUL MIGHT HAPPEN: NOT AT ALL
GAD7 TOTAL SCORE: 0

## 2023-11-02 ENCOUNTER — OFFICE VISIT (OUTPATIENT)
Dept: FAMILY MEDICINE | Facility: CLINIC | Age: 18
End: 2023-11-02
Payer: COMMERCIAL

## 2023-11-02 VITALS
HEIGHT: 62 IN | HEART RATE: 93 BPM | RESPIRATION RATE: 16 BRPM | OXYGEN SATURATION: 96 % | DIASTOLIC BLOOD PRESSURE: 60 MMHG | TEMPERATURE: 98.7 F | BODY MASS INDEX: 40.12 KG/M2 | SYSTOLIC BLOOD PRESSURE: 104 MMHG | WEIGHT: 218 LBS

## 2023-11-02 DIAGNOSIS — F90.2 ATTENTION DEFICIT HYPERACTIVITY DISORDER (ADHD), COMBINED TYPE: Primary | ICD-10-CM

## 2023-11-02 DIAGNOSIS — E66.09 CLASS 2 OBESITY DUE TO EXCESS CALORIES WITHOUT SERIOUS COMORBIDITY WITH BODY MASS INDEX (BMI) OF 39.0 TO 39.9 IN ADULT: ICD-10-CM

## 2023-11-02 DIAGNOSIS — E66.812 CLASS 2 OBESITY DUE TO EXCESS CALORIES WITHOUT SERIOUS COMORBIDITY WITH BODY MASS INDEX (BMI) OF 39.0 TO 39.9 IN ADULT: ICD-10-CM

## 2023-11-02 DIAGNOSIS — F50.819 BINGE EATING DISORDER: ICD-10-CM

## 2023-11-02 PROCEDURE — 99214 OFFICE O/P EST MOD 30 MIN: CPT | Performed by: PEDIATRICS

## 2023-11-02 RX ORDER — LISDEXAMFETAMINE DIMESYLATE 30 MG/1
30 CAPSULE ORAL DAILY
Qty: 30 CAPSULE | Refills: 0 | Status: SHIPPED | OUTPATIENT
Start: 2023-11-02 | End: 2023-12-02

## 2023-11-02 RX ORDER — LISDEXAMFETAMINE DIMESYLATE 30 MG/1
30 CAPSULE ORAL EVERY MORNING
Qty: 30 CAPSULE | Refills: 0 | Status: CANCELLED | OUTPATIENT
Start: 2023-11-02

## 2023-11-02 RX ORDER — LISDEXAMFETAMINE DIMESYLATE 30 MG/1
30 CAPSULE ORAL DAILY
Qty: 30 CAPSULE | Refills: 0 | Status: SHIPPED | OUTPATIENT
Start: 2023-12-03 | End: 2024-01-02

## 2023-11-02 NOTE — PROGRESS NOTES
Assessment & Plan     Attention deficit hyperactivity disorder (ADHD), combined type    - lisdexamfetamine (VYVANSE) 30 MG capsule; Take 1 capsule (30 mg) by mouth daily for 30 days  - lisdexamfetamine (VYVANSE) 30 MG capsule; Take 1 capsule (30 mg) by mouth daily for 30 days    Binge eating disorder    - lisdexamfetamine (VYVANSE) 30 MG capsule; Take 1 capsule (30 mg) by mouth daily for 30 days  - lisdexamfetamine (VYVANSE) 30 MG capsule; Take 1 capsule (30 mg) by mouth daily for 30 days    Class 2 obesity due to excess calories without serious comorbidity with body mass index (BMI) of 39.0 to 39.9 in adult    - lisdexamfetamine (VYVANSE) 30 MG capsule; Take 1 capsule (30 mg) by mouth daily for 30 days  - lisdexamfetamine (VYVANSE) 30 MG capsule; Take 1 capsule (30 mg) by mouth daily for 30 days  - Comprehensive metabolic panel (BMP + Alb, Alk Phos, ALT, AST, Total. Bili, TP); Future  - Lipid panel reflex to direct LDL Fasting; Future  - TSH with free T4 reflex; Future         Plan:    Continue current Vyvanse 30 mg once daily.  Discussed if she continues having headache issue after being back on it consistently daily may need to consider dropping her dose further or pivoting to a different medication.  Briefly discussed alternative medication options for obesity.  I would like to repeat her labs as last LDL was greater than 200 and she has never had any treatment for this. Patient will schedule lab only and do these fasting when she is feeling better.   Suggested she test at home for COVID.   Return to clinic for recheck in approximately 2 months, sooner if her labs are abnormal.    Kaylynn Ferraro MD on 11/2/2023 at 1:58 PM      Corky Boone is a 18 year old, presenting for the following health issues:  Recheck Medication        11/2/2023     7:50 AM   Additional Questions   Roomed by Katharine Ellis of Present Illness       Reason for visit:  Med follow up    She eats 2-3 servings of fruits and  "vegetables daily.She consumes 1 sweetened beverage(s) daily.She exercises with enough effort to increase her heart rate 10 to 19 minutes per day.  She exercises with enough effort to increase her heart rate 3 or less days per week.   She is taking medications regularly.         Here today for follow up on Vyvnance takes daily.      Hasn't been checkin in on her weigh ta lot. Had prescribed the injectables, but were too expensive for her to trial.  This was the second time they tried doing and is okay with doing this.      Has been helping her attention and focus.  Is holding off on wt management clinic.  Headaches happen more in the afternoon.  If not on the Vyvanse the headaches are better.  Was up to 40 in the past and was getting really bad headaches.  Since on and off of it the headaches are there.      Is living with her parents now.  Eats what they cook for dinner.  Not doing as much exercise right now.  Does walk to classes.  Beginning of January was into [employer].  Has a rower and bench press.  Did make her feel better.      Was also wondering about getting her COVID and flu vaccine.      Has not been feeling great for past several days- cough but no fever.           Objective    /60 (BP Location: Right arm, Patient Position: Sitting)   Pulse 93   Temp 98.7  F (37.1  C) (Tympanic)   Resp 16   Ht 1.575 m (5' 2\")   Wt 98.9 kg (218 lb)   LMP 10/12/2023 (Exact Date)   SpO2 96%   BMI 39.87 kg/m    Body mass index is 39.87 kg/m .    Physical Exam     General:  Alert and oriented, No acute distress.  Ill appearing but not toxic.   Eye:  Pupils are equal, round and reactive to light, Extraocular movements are intact, sclera clear.  HENT:  Tympanic membranes are clear, Oral mucosa is moist, No pharyngeal erythema.  Neck:  No lymphadenopathy.    Respiratory:  Lungs clear to auscultation bilaterally.  Equal air entry.  Symmetrical chest expansion.  No wheezing.    Cardiovascular:  S1 and S2 with regular " rate and rhythm.  No murmurs.    Integumentary:  No rash.    Neurologic:  No focal deficits.

## 2023-11-09 ENCOUNTER — VIRTUAL VISIT (OUTPATIENT)
Dept: BEHAVIORAL HEALTH | Facility: CLINIC | Age: 18
End: 2023-11-09
Payer: COMMERCIAL

## 2023-11-09 DIAGNOSIS — F41.9 ANXIETY DISORDER, UNSPECIFIED TYPE: Primary | ICD-10-CM

## 2023-11-09 DIAGNOSIS — F33.40 RECURRENT MAJOR DEPRESSIVE DISORDER, IN REMISSION (H): ICD-10-CM

## 2023-11-09 PROCEDURE — 90834 PSYTX W PT 45 MINUTES: CPT | Mod: VID | Performed by: SOCIAL WORKER

## 2023-11-09 NOTE — PROGRESS NOTES
[facility] Primary Care: Integrated Behavioral Health  November 9, 2023      Behavioral Health Clinician Progress Note    Patient Name: Mirna Carl           Service Type:  Individual      [facility]   [facility] / Email (patient reached)      Session Start Time: 1:00p  Session End Time:  1:45p      Session Length: 38 - 52      Attendees: Client     Service Modality:  Video Visit:      Provider verified identity through the following two step process.  Patient provided:  Patient is known previously to provider    Telemedicine Visit: The patient's condition can be safely assessed and treated via synchronous audio and visual telemedicine encounter.      Reason for Telemedicine Visit: Patient has requested telehealth visit    Originating Site (Patient Location): Patient's home    Distant Site (Provider Location): Provider Remote Setting- Home Office    Consent:  The patient/guardian has verbally consented to: the potential risks and benefits of telemedicine (video visit) versus in person care; bill my insurance or make self-payment for services provided; and responsibility for payment of non-covered services.     Patient would like the video invitation sent by:  My Chart    Mode of Communication:  Video Conference via [facility]    Distant Location (Provider):  Off-site    As the provider I attest to compliance with applicable laws and regulations related to telemedicine.    Visit Activities (Refresh list every visit): [facility] Only    Diagnostic Assessment Date: 12/14/22  Treatment Plan Review Date: 1/24/24  See Flowsheets for today's PHQ-9 and MATT-7 results  Previous PHQ-9:       9/18/2023     8:57 AM 10/23/2023     2:58 PM 11/1/2023    11:29 AM   PHQ-9 SCORE   PHQ-9 Total Score MyChart 0 0 0   PHQ-9 Total Score 0 0 0     Previous MATT-7:       12/14/2022    11:09 AM 7/12/2023     6:44 AM 11/1/2023    11:29 AM   MATT-7 SCORE   Total Score 0 (minimal anxiety) 0 (minimal anxiety) 0  (minimal anxiety)   Total Score 0 0 0       DATA  Extended Session (60+ minutes): No  Interactive Complexity: No  Crisis: No  [facility] Patient: No    Treatment Objective(s) Addressed in This Session:  Target Behavior(s):  anxiety    Anxiety: will experience a reduction in anxiety    Current Stressors / Issues:  Pt reports that she is doing ok.  Sick with COVID last week so missed 1 week of school and work.  Is able to return to classes now and is feeling a bit better every day.    Shares that [facility] has extended Thanksgiving break and giving students 1 week off due to 3 student suicides and 1 professor death over the past 1 month.  Pt reports that while she didn't know the students or professor personally she can feel sadness on campus.    Pt shares that she is going to be taking a math course over J term and hopes to be able to  some extra work hours as well.    Pt shares that she has been feeling a bit stressed and more irritable in regards to her relationship with her dad.  She feels that he doesn't listen when they do talk.  She admits that she does limit the time she spends with him.  Does feel good support from her mother and sister.      Progress on Treatment Objective(s) / Homework:  Stable - ACTION (Actively working towards change); Intervened by reinforcing change plan / affirming steps taken    Motivational Interviewing    MI Intervention: Expressed Empathy/Understanding, Permission to raise concern or advise, Open-ended questions and Reflections: simple and complex     Change Talk Expressed by the Patient: Desire to change Committment to change    Provider Response to Change Talk: E - Evoked more info from patient about behavior change, A - Affirmed patient's thoughts, decisions, or attempts at behavior change, R - Reflected patient's change talk and S - Summarized patient's change talk statements      Care Plan review completed: Yes    Medication Review:  Changes to psychiatric medications, see updated  Medication List in EPIC.      Medication Compliance:  Yes    Changes in Health Issues:   None reported    Chemical Use Review:   Substance Use: Chemical use reviewed, no active concerns identified      Tobacco Use: No current tobacco use.      Assessment: Current Emotional / Mental Status (status of significant symptoms):  Risk status (Self / Other harm or suicidal ideation)  Patient denies a history of suicidal ideation, suicide attempts, self-injurious behavior, homicidal ideation, homicidal behavior and and other safety concerns  Patient denies current fears or concerns for personal safety.  Patient denies current or recent suicidal ideation or behaviors.  Patient denies current or recent homicidal ideation or behaviors.  Patient denies current or recent self injurious behavior or ideation.  Patient denies other safety concerns.  A safety and risk management plan has not been developed at this time, however patient was encouraged to call [place] [phone] should there be a change in any of these risk factors.    Appearance:   Appropriate   Eye Contact:   Good   Psychomotor Behavior: Normal   Attitude:   Cooperative   Orientation:   All  Speech   Rate / Production: Normal/ Responsive Normal    Volume:  Normal   Mood:    Normal  Affect:    Appropriate   Thought Content:  Clear   Thought Form:  Coherent  Logical   Insight:    Good     Diagnoses:  1. Anxiety disorder, unspecified type    2. Recurrent major depressive disorder, in remission (H24)        Collateral Reports Completed:  Not Applicable    Plan: (Homework, other):  Patient was given information about behavioral services and encouraged to schedule a follow up appointment with the clinic [facility] in 3 weeks.  She was also given information about mental health symptoms and treatment options .  CD Recommendations: No indications of CD issues.   Ligia Garcia, KVNG, [facility]                                              Individual Treatment Plan    Patient's Name:  Mirna Carl  YOB: 2005    Date of Creation: 1/11/23   Date Treatment Plan Last Reviewed/Revised: 10/24/23    DSM5 Diagnoses: 296.30 (F33.9) Major Depressive Disorder, Recurrent Episode, Unspecified _ and With anxious distress or 300.00 (F41.9) Unspecified Anxiety Disorder  Psychosocial / Contextual Factors: school stress, finding an internship, relationship with father  PROMIS (reviewed every 90 days):   PROMIS-10 Scores        3/22/2023    11:08 AM 7/10/2023     7:56 PM 9/18/2023     9:03 AM   PROMIS-10 Total Score w/o Sub Scores   PROMIS TOTAL - SUBSCORES 32 31 38        Referral / Collaboration:  Referral to another professional/service is not indicated at this time.    Anticipated number of session for this episode of care: 6-9 sessions  Anticipation frequency of session: Monthly  Anticipated Duration of each session: 38-52 minutes  Treatment plan will be reviewed in 90 days or when goals have been changed.       MeasurableTreatment Goal(s) related to diagnosis / functional impairment(s)  Goal 1: Patient will experience a reduction in anxious symptoms, along with a corresponding increase in relaxed emotional state.    I will know I've met my goal when anxious worries/fears decrease.      Objective #A (Patient Action)    Patient will use cognitive strategies identified in therapy to challenge anxious thoughts.  Status: Continued - Date(s):10/24/23     Intervention(s)  Therapist will utilize CBT and ACT to help pt challenge anxious thoughts and reduce intensity/duration of anxious distress.      Patient has reviewed and agreed to the above plan.        Ligia Garcia, ESVIN, LICSW, [facility]  November 9, 2023

## 2023-11-14 ENCOUNTER — MYC REFILL (OUTPATIENT)
Dept: FAMILY MEDICINE | Facility: CLINIC | Age: 18
End: 2023-11-14
Payer: COMMERCIAL

## 2023-11-14 DIAGNOSIS — F50.819 BINGE EATING DISORDER: ICD-10-CM

## 2023-11-14 DIAGNOSIS — E66.812 CLASS 2 OBESITY DUE TO EXCESS CALORIES WITHOUT SERIOUS COMORBIDITY WITH BODY MASS INDEX (BMI) OF 39.0 TO 39.9 IN ADULT: ICD-10-CM

## 2023-11-14 DIAGNOSIS — E66.09 CLASS 2 OBESITY DUE TO EXCESS CALORIES WITHOUT SERIOUS COMORBIDITY WITH BODY MASS INDEX (BMI) OF 39.0 TO 39.9 IN ADULT: ICD-10-CM

## 2023-11-14 DIAGNOSIS — F90.2 ATTENTION DEFICIT HYPERACTIVITY DISORDER (ADHD), COMBINED TYPE: ICD-10-CM

## 2023-11-14 RX ORDER — LISDEXAMFETAMINE DIMESYLATE 30 MG/1
30 CAPSULE ORAL DAILY
Qty: 30 CAPSULE | Refills: 0 | Status: CANCELLED | OUTPATIENT
Start: 2023-11-14

## 2023-11-14 NOTE — TELEPHONE ENCOUNTER
Pt stating med did not go through to pharmacy, escript went through on our end. Please call pharmacy and update pt.    Criselda Harper RN

## 2023-12-05 ENCOUNTER — LAB (OUTPATIENT)
Dept: LAB | Facility: CLINIC | Age: 18
End: 2023-12-05
Payer: COMMERCIAL

## 2023-12-05 ENCOUNTER — VIRTUAL VISIT (OUTPATIENT)
Dept: BEHAVIORAL HEALTH | Facility: CLINIC | Age: 18
End: 2023-12-05
Payer: COMMERCIAL

## 2023-12-05 DIAGNOSIS — E66.812 CLASS 2 OBESITY DUE TO EXCESS CALORIES WITHOUT SERIOUS COMORBIDITY WITH BODY MASS INDEX (BMI) OF 39.0 TO 39.9 IN ADULT: ICD-10-CM

## 2023-12-05 DIAGNOSIS — F41.9 ANXIETY DISORDER, UNSPECIFIED TYPE: Primary | ICD-10-CM

## 2023-12-05 DIAGNOSIS — F33.40 RECURRENT MAJOR DEPRESSIVE DISORDER, IN REMISSION (H): ICD-10-CM

## 2023-12-05 DIAGNOSIS — E66.09 CLASS 2 OBESITY DUE TO EXCESS CALORIES WITHOUT SERIOUS COMORBIDITY WITH BODY MASS INDEX (BMI) OF 39.0 TO 39.9 IN ADULT: ICD-10-CM

## 2023-12-05 LAB
ALBUMIN SERPL BCG-MCNC: 4.1 G/DL (ref 3.5–5.2)
ALP SERPL-CCNC: 104 U/L (ref 40–150)
ALT SERPL W P-5'-P-CCNC: 12 U/L (ref 0–50)
ANION GAP SERPL CALCULATED.3IONS-SCNC: 10 MMOL/L (ref 7–15)
AST SERPL W P-5'-P-CCNC: 16 U/L (ref 0–45)
BILIRUB SERPL-MCNC: 0.4 MG/DL
BUN SERPL-MCNC: 8.7 MG/DL (ref 6–20)
CALCIUM SERPL-MCNC: 8.8 MG/DL (ref 8.6–10)
CHLORIDE SERPL-SCNC: 106 MMOL/L (ref 98–107)
CHOLEST SERPL-MCNC: 300 MG/DL
CREAT SERPL-MCNC: 0.58 MG/DL (ref 0.51–0.95)
DEPRECATED HCO3 PLAS-SCNC: 22 MMOL/L (ref 22–29)
EGFRCR SERPLBLD CKD-EPI 2021: >90 ML/MIN/1.73M2
GLUCOSE SERPL-MCNC: 92 MG/DL (ref 70–99)
HDLC SERPL-MCNC: 46 MG/DL
LDLC SERPL CALC-MCNC: 233 MG/DL
NONHDLC SERPL-MCNC: 254 MG/DL
POTASSIUM SERPL-SCNC: 4.1 MMOL/L (ref 3.4–5.3)
PROT SERPL-MCNC: 7.1 G/DL (ref 6.4–8.3)
SODIUM SERPL-SCNC: 138 MMOL/L (ref 135–145)
TRIGL SERPL-MCNC: 104 MG/DL
TSH SERPL DL<=0.005 MIU/L-ACNC: 4.14 UIU/ML (ref 0.3–4.2)

## 2023-12-05 PROCEDURE — 80061 LIPID PANEL: CPT

## 2023-12-05 PROCEDURE — 84443 ASSAY THYROID STIM HORMONE: CPT

## 2023-12-05 PROCEDURE — 36415 COLL VENOUS BLD VENIPUNCTURE: CPT

## 2023-12-05 PROCEDURE — 90832 PSYTX W PT 30 MINUTES: CPT | Mod: VID | Performed by: SOCIAL WORKER

## 2023-12-05 PROCEDURE — 80053 COMPREHEN METABOLIC PANEL: CPT

## 2023-12-05 NOTE — PROGRESS NOTES
[facility] Primary Care: Integrated Behavioral Health  December 5, 2023        Behavioral Health Clinician Progress Note    Patient Name: Mirna Carl           Service Type:  Individual      Service Location:   [facility] / Email (patient reached)      Session Start Time: 11:00a   Session End Time:  11:35a      Session Length: 16 - 37      Attendees: Client     Service Modality:  Video Visit:      Provider verified identity through the following two step process.  Patient provided:  Patient is known previously to provider    Telemedicine Visit: The patient's condition can be safely assessed and treated via synchronous audio and visual telemedicine encounter.      Reason for Telemedicine Visit: Patient has requested telehealth visit    Originating Site (Patient Location): Patient's home    Distant Site (Provider Location): Provider Remote Setting- Home Office    Consent:  The patient/guardian has verbally consented to: the potential risks and benefits of telemedicine (video visit) versus in person care; bill my insurance or make self-payment for services provided; and responsibility for payment of non-covered services.     Patient would like the video invitation sent by:  My Chart    Mode of Communication:  Video Conference via [facility]    Distant Location (Provider):  Off-site    As the provider I attest to compliance with applicable laws and regulations related to telemedicine.    Visit Activities (Refresh list every visit): [facility] Only    Diagnostic Assessment Date: 12/14/22- will update DA at next visit.  Treatment Plan Review Date: 1/24/24  See Flowsheets for today's PHQ-9 and MATT-7 results  Previous PHQ-9:       10/23/2023     2:58 PM 11/1/2023    11:29 AM 12/5/2023    10:58 AM   PHQ-9 SCORE   PHQ-9 Total Score MyChart 0 0 0   PHQ-9 Total Score 0 0 0     Previous MATT-7:       12/14/2022    11:09 AM 7/12/2023     6:44 AM 11/1/2023    11:29 AM   MATT-7 SCORE   Total Score 0 (minimal  anxiety) 0 (minimal anxiety) 0 (minimal anxiety)   Total Score 0 0 0       DATA  Extended Session (60+ minutes): No  Interactive Complexity: No  Crisis: No  [facility] Patient: No    Treatment Objective(s) Addressed in This Session:  Target Behavior(s):  anxiety    Anxiety: will experience a reduction in anxiety    Current Stressors / Issues:  Pt and parents got COVID over the past 3 weeks and shares that everyone is doing well.  Had a week off of school for Thanksgiving break.  Only 3 more weeks of this semester. Finals in 3 weeks and then has 1 class during J term.  A little stress currently with having a school payment coming up and dad is asking for car payment as well.  Continues to have some issues with her relationship with dad.  Is buying a car from parents and owes parents monthly for a car payment.  Pt shares that father gets upset if she spends money on anything else.  Talked about communication patterns with her father.  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        Progress on Treatment Objective(s) / Homework:  Stable - ACTION (Actively working towards change); Intervened by reinforcing change plan / affirming steps taken    Motivational Interviewing    MI Intervention: Expressed Empathy/Understanding, Permission to raise concern or advise, Open-ended questions and Reflections: simple and complex     Change Talk Expressed by the Patient: Desire to change Committment to change    Provider Response to Change Talk: E - Evoked more info from patient about behavior change, A - Affirmed patient's thoughts, decisions, or attempts at behavior change, R - Reflected patient's change talk and S - Summarized patient's change talk statements      Care Plan review completed: Yes    Medication Review:  Changes to psychiatric medications, see  updated Medication List in EPIC.      Medication Compliance:  Yes    Changes in Health Issues:   None reported    Chemical Use Review:   Substance Use: Chemical use reviewed, no active concerns identified      Tobacco Use: No current tobacco use.      Assessment: Current Emotional / Mental Status (status of significant symptoms):  Risk status (Self / Other harm or suicidal ideation)  Patient denies a history of suicidal ideation, suicide attempts, self-injurious behavior, homicidal ideation, homicidal behavior and and other safety concerns  Patient denies current fears or concerns for personal safety.  Patient denies current or recent suicidal ideation or behaviors.  Patient denies current or recent homicidal ideation or behaviors.  Patient denies current or recent self injurious behavior or ideation.  Patient denies other safety concerns.  A safety and risk management plan has not been developed at this time, however patient was encouraged to call [place] [phone] should there be a change in any of these risk factors.    Appearance:   Appropriate   Eye Contact:   Good   Psychomotor Behavior: Normal   Attitude:   Cooperative   Orientation:   All  Speech   Rate / Production: Normal/ Responsive Normal    Volume:  Normal   Mood:    Normal  Affect:    Appropriate   Thought Content:  Clear   Thought Form:  Coherent  Logical   Insight:    Good     Diagnoses:  1. Anxiety disorder, unspecified type    2. Recurrent major depressive disorder, in remission (H24)        Collateral Reports Completed:  Not Applicable    Plan: (Homework, other):  Patient was given information about behavioral services and encouraged to schedule a follow up appointment with the clinic [facility] in 2 weeks.  She was also given information about mental health symptoms and treatment options .  CD Recommendations: No indications of CD issues.   Ligia Garcia, KVNG, [facility]                                              Individual Treatment Plan    Patient's  Name: Mirna Carl  YOB: 2005    Date of Creation: 1/11/23   Date Treatment Plan Last Reviewed/Revised: 10/24/23    DSM5 Diagnoses: 296.30 (F33.9) Major Depressive Disorder, Recurrent Episode, Unspecified _ and With anxious distress or 300.00 (F41.9) Unspecified Anxiety Disorder  Psychosocial / Contextual Factors: school stress, finding an internship, relationship with father  PROMIS (reviewed every 90 days):   PROMIS-10 Scores        3/22/2023    11:08 AM 7/10/2023     7:56 PM 9/18/2023     9:03 AM   PROMIS-10 Total Score w/o Sub Scores   PROMIS TOTAL - SUBSCORES 32 31 38        Referral / Collaboration:  Referral to another professional/service is not indicated at this time.    Anticipated number of session for this episode of care: 6-9 sessions  Anticipation frequency of session: Monthly  Anticipated Duration of each session: 38-52 minutes  Treatment plan will be reviewed in 90 days or when goals have been changed.       MeasurableTreatment Goal(s) related to diagnosis / functional impairment(s)  Goal 1: Patient will experience a reduction in anxious symptoms, along with a corresponding increase in relaxed emotional state.    I will know I've met my goal when anxious worries/fears decrease.      Objective #A (Patient Action)    Patient will use cognitive strategies identified in therapy to challenge anxious thoughts.  Status: Continued - Date(s):10/24/23     Intervention(s)  Therapist will utilize CBT and ACT to help pt challenge anxious thoughts and reduce intensity/duration of anxious distress.      Patient has reviewed and agreed to the above plan.        Ligia Garcia, ESVIN, [facility], [facility]  December 5, 2023

## 2023-12-06 DIAGNOSIS — E78.5 DYSLIPIDEMIA WITH ELEVATED LOW DENSITY LIPOPROTEIN (LDL) CHOLESTEROL AND ABNORMALLY LOW HIGH DENSITY LIPOPROTEIN (HDL) CHOLESTEROL: Primary | ICD-10-CM

## 2023-12-19 ENCOUNTER — VIRTUAL VISIT (OUTPATIENT)
Dept: BEHAVIORAL HEALTH | Facility: CLINIC | Age: 18
End: 2023-12-19
Payer: COMMERCIAL

## 2023-12-19 DIAGNOSIS — F33.40 RECURRENT MAJOR DEPRESSIVE DISORDER, IN REMISSION (H): ICD-10-CM

## 2023-12-19 DIAGNOSIS — F41.9 ANXIETY DISORDER, UNSPECIFIED TYPE: Primary | ICD-10-CM

## 2023-12-19 PROCEDURE — 90791 PSYCH DIAGNOSTIC EVALUATION: CPT | Mod: VID | Performed by: SOCIAL WORKER

## 2023-12-19 ASSESSMENT — COLUMBIA-SUICIDE SEVERITY RATING SCALE - C-SSRS
ATTEMPT SINCE LAST CONTACT: NO
TOTAL  NUMBER OF INTERRUPTED ATTEMPTS SINCE LAST CONTACT: NO
6. HAVE YOU EVER DONE ANYTHING, STARTED TO DO ANYTHING, OR PREPARED TO DO ANYTHING TO END YOUR LIFE?: NO
2. HAVE YOU ACTUALLY HAD ANY THOUGHTS OF KILLING YOURSELF?: NO
1. SINCE LAST CONTACT, HAVE YOU WISHED YOU WERE DEAD OR WISHED YOU COULD GO TO SLEEP AND NOT WAKE UP?: NO
SUICIDE, SINCE LAST CONTACT: NO
TOTAL  NUMBER OF ABORTED OR SELF INTERRUPTED ATTEMPTS SINCE LAST CONTACT: NO

## 2023-12-19 NOTE — PROGRESS NOTES
"    [facility] Primary Care: Integrated Behavioral Health      PATIENT'S NAME: Mirna Carl  PREFERRED NAME: Paolo  PRONOUNS:       MRN: [id number]  : 2005  ADDRESS: [facility] [id number]  ACCT. NUMBER:  [id number]  DATE OF SERVICE: 23  START TIME: 12:00p  END TIME: 12:49p  PREFERRED PHONE: [phone]  May we leave a program related message: Yes  EMERGENCY CONTACT: was obtained  .  SERVICE MODALITY:  Video Visit:      Provider verified identity through the following two step process.  Patient provided:  Patient is known previously to provider    Telemedicine Visit: The patient's condition can be safely assessed and treated via synchronous audio and visual telemedicine encounter.      Reason for Telemedicine Visit: Patient has requested telehealth visit    Originating Site (Patient Location): Patient's home    Distant Site (Provider Location): [facility]    Consent:  The patient/guardian has verbally consented to: the potential risks and benefits of telemedicine (video visit) versus in person care; bill my insurance or make self-payment for services provided; and responsibility for payment of non-covered services.     Patient would like the video invitation sent by:  My Chart    Mode of Communication:  Video Conference via Amwell    Distant Location (Provider):  On-site    As the provider I attest to compliance with applicable laws and regulations related to telemedicine.    UNIVERSAL ADULT Mental Health DIAGNOSTIC ASSESSMENT    Identifying Information:  Patient is a 18 year old,  individual.  Patient was referred for an assessment by self and primary care providerself.  Patient attended the session alone.  Going out gives her a little anxiety.  Not worried about what others will think.  More of the motivation.      Chief Complaint:   The reason for seeking services at this time is: \" on-going anxiety and mood " "symptoms \"   The problem(s) began in High School and have occurred off and on Patient has attempted to resolve these concerns in the past through ,medications and brief therapy .  Pt shares that it is finals week and has had 1 final so far and 4 more between Wed/Thurs.    Grandparents come over for [place] and spend time with pt and family otherwise pt reports that it is quiet and that they don't go anywhere.   Found out has high cholesterol and was referred to a cardiologist.  Has an appt set for later in January.    Pt shares that she would like to find ways to not cry so easily.  [facility] and pt talked about preparing herself if she has to talk to someone about something that is difficult to talk about.  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 Encouraged pt to talk to intern supervisor if she feels comfortable.      Social/Family History:  Patient reported they grew up in [facility] in [place] and moved a lot.  Spent a few years in [place], [place] and past 7 years in [place], [place].  They were raised by biological mother and step father.  Parents were never  and broke up when pt was around 2.  Pt shares she had little contact with her bio father while growing up.  She shares that he will send a card every now and then.  Mother remarried when pt was around 4-5 years old and she considers her step-father her father.  Bio father is  and has a family although pt has not met them.  Pt has 1 step-sister who is 13 and pt lives with.    Patient reported that their childhood was good overall.  Patient described their current relationships with family of origin as good with mother and sister and fair with step-father.      The patient describes their cultural background as maternal grandparents are Anabaptism and not very open minded.  Pt shares that her parents are liberal and open " minded.  Cultural influences and impact on patient's life structure, values, norms, and healthcare: n/a.  Contextual influences on patient's health include: Contextual Factors: Individual Factors: in last year of college, working, living with parents, etc.  Cultural, Contextual, and socioeconomic factors do not affect the patient's access to services.  These factors will be addressed in the Preliminary Treatment plan.  Patient identified their preferred language to be English. Patient reported they do not  need the assistance of an  or other support involved in therapy.     Patient reported had no significant delays in developmental tasks.   Patient's highest education level was some college. Patient identified the following learning problems: attention.  Modifications will not be used to assist communication in therapy.   Patient reports they are  able to understand written materials.    Patient reported the following relationship history single.  Patient's current relationship status is single .   Patient identified their sexual orientation as homosexual.  Patient reported having zero child(moustapha). Patient identified mother, siblings, pets, and friends as part of their support system.  Patient identified the quality of these relationships as good.     Patient's current living/housing situation involves staying with parents .  They live with mother, step-father, sister and they report that housing is stable.     Patient is currently employed part time and reports they are able to function appropriately at work..  Patient reports their finances are obtained through employment and parents.  Patient does identify finances as a current stressor.      Patient reported that they have not been involved with the legal system.   Patient denies being on probation / parole / under the jurisdiction of the court.    Patient's Strengths and Limitations:  Patient identified the following strengths or resources that will  help them succeed in treatment: commitment to health and well being, friends / good social support, family support, intelligence, positive school connection, and positive work environment. Things that may interfere with the patient's success in treatment include: financial hardship.     Assessments:  The following assessments were completed by patient for this visit:  PHQ9:       3/7/2023    12:20 PM 4/12/2023     9:55 AM 7/12/2023     6:41 AM 9/18/2023     8:57 AM 10/23/2023     2:58 PM 11/1/2023    11:29 AM 12/5/2023    10:58 AM   PHQ-9 SCORE   PHQ-9 Total Score MyChart 0 0 0 0 0 0 0   PHQ-9 Total Score 0 0 0    0 0 0 0 0     GAD2:       3/22/2023    11:07 AM 4/29/2023     2:42 PM 7/10/2023     7:52 PM 9/18/2023     9:02 AM 10/19/2023    10:37 AM 11/23/2023     3:24 PM   MATT-2   Feeling nervous, anxious, or on edge 0 0 0 0 0 0   Not being able to stop or control worrying 0 0 0 0 0 0   MATT-2 Total Score 0 0 0 0 0 0     GAD7:       10/28/2022     4:19 PM 12/14/2022    11:09 AM 7/12/2023     6:44 AM 11/1/2023    11:29 AM   MATT-7 SCORE   Total Score  0 (minimal anxiety) 0 (minimal anxiety) 0 (minimal anxiety)   Total Score 13 0 0 0     CAGE-AID:       9/18/2023     9:03 AM   CAGE-AID Total Score   Total Score 0   Total Score MyChart 0 (A total score of 2 or greater is considered clinically significant)     PROMIS 10-Global Health (only subscores and total score):       12/14/2022    11:10 AM 3/22/2023    11:08 AM 7/10/2023     7:56 PM 9/18/2023     9:03 AM 12/18/2023     2:16 PM   PROMIS-10 Scores Only   Global Mental Health Score 20 14 13 18 14   Global Physical Health Score 20 18 18 20 18   PROMIS TOTAL - SUBSCORES 40 32 31 38 32     Leopold Suicide Severity Rating Scale (Lifetime/Recent)      12/14/2022    11:48 AM   Leopold Suicide Severity Rating (Lifetime/Recent)   Q1 Wish to be Dead (Lifetime) Y   1. Wish to be Dead (Past 1 Month) N   Q2 Non-Specific Active Suicidal Thoughts (Lifetime) Y   2. Non-Specific  Active Suicidal Thoughts (Past 1 Month) N   3. Active Suicidal Ideation with any Methods (Not Plan) Without Intent to Act (Lifetime) Y   Q3 Active Suicidal Ideation with any Methods (Not Plan) Without Intent to Act (Past 1 Month) N   Q4 Active Suicidal Ideation with Some Intent to Act, Without Specific Plan (Lifetime) Y   4. Active Suicidal Ideation with Some Intent to Act, Without Specific Plan (Past 1 Month) N   Q5 Active Suicidal Ideation with Specific Plan and Intent (Lifetime) Y   5. Active Suicidal Ideation with Specific Plan and Intent (Past 1 Month) N   Most Severe Ideation Rating (Lifetime) 5   Frequency (Lifetime) 2   Duration (Lifetime) 1   Controllability (Lifetime) 1   Reasons for Ideation (Lifetime) 4   Reasons for Ideation (Past 1 Month) 0   Actual Attempt (Lifetime) Y   Total Number of Actual Attempts (Lifetime) 1   Actual Attempt Description (Lifetime) benedryl overdose around 2018   Actual Attempt (Past 3 Months) N   Has subject engaged in non-suicidal self-injurious behavior? (Lifetime) Y   Has subject engaged in non-suicidal self-injurious behavior? (Past 3 Months) N   Interrupted Attempts (Lifetime) N   Aborted or Self-Interrupted Attempt (Lifetime) N   Preparatory Acts or Behavior (Lifetime) N   Actual Lethality/Medical Damage Code (Most Recent Attempt) 2   Potential Lethality Code (Most Recent Attempt) 1   Calculated C-SSRS Risk Score (Lifetime/Recent) Moderate Risk       Harrison Suicide Severity Rating Scale (Short Version)      12/19/2023    12:27 PM   Harrison Suicide Severity Rating (Short Version)   1. Wish to be Dead (Since Last Contact) N   2. Non-Specific Active Suicidal Thoughts (Since Last Contact) N   Actual Attempt (Since Last Contact) N   Has subject engaged in non-suicidal self-injurious behavior? (Since Last Contact) N   Interrupted Attempts (Since Last Contact) N   Aborted or Self-Interrupted Attempt (Since Last Contact) N   Preparatory Acts or Behavior (Since Last Contact) N    Suicide (Since Last Contact) N   Calculated C-SSRS Risk Score (Since Last Contact) No Risk Indicated       Personal and Family Medical History:  Patient does not report a family history of mental health concerns.  Patient reports family history includes Alcoholism in her maternal grandmother; Anxiety Disorder in her mother; Cerebrovascular Disease in her mother; Hyperlipidemia in her maternal grandmother and mother..     Patient does report Mental Health Diagnosis and/or Treatment.  Patient Patient reported the following previous diagnoses which include(s): ADHD and an Anxiety Disorder.  Patient reported symptoms began several years ago.   Patient has received mental health services in the past: primary care provider at [facility]..  Psychiatric Hospitalizations: None.  Patient denies a history of civil commitment.  Patient is receiving other mental health services.  These include Behavioral Health Clinician and PCP as well as nutrition provider .       Patient has had a physical exam to rule out medical causes for current symptoms.  Date of last physical exam was within the past year. Client was encouraged to follow up with PCP if symptoms were to develop. The patient has a [place] Primary Care Provider, who is named Kaylynn Ferraro..  Patient reports no current medical concerns.  Patient denies any issues with pain..   There are not significant appetite / nutritional concerns / weight changes. These may include: hx of binge eating. Patient reports the following sleep concerns:  No concerns.   Patient does not report a history of head injury / trauma / cognitive impairment.      Patient reports current meds as:   No outpatient medications have been marked as taking for the 12/19/23 encounter (Appointment) with Ligia Garcia MSW.     Current Outpatient Medications   Medication    cetirizine (ZYRTEC) 10 MG tablet    FLUoxetine (PROZAC) 40 MG capsule    levonorgestrel-ethinyl estradiol (AVIANE)  0.1-20 MG-MCG tablet    lisdexamfetamine (VYVANSE) 30 MG capsule     No current facility-administered medications for this visit.         Medication Adherence:  Patient reports taking prescribed medications as prescribed.    Patient Allergies:    Allergies   Allergen Reactions    Horse Protein Cough, Difficulty breathing, Fatigue, Headache, Hives, Itching and Shortness Of Breath       Medical History:    Past Medical History:   Diagnosis Date    Anxiety disorder 03/28/2022    Depressive disorder     Hashimoto's thyroiditis 03/28/2022    Moderate episode of recurrent major depressive disorder (H) 03/28/2022    Seasonal allergic rhinitis 03/28/2022    Uncomplicated asthma          Current Mental Status Exam:   Appearance:  Appropriate    Eye Contact:  Good   Psychomotor:  Normal       Gait / station:  no problem  Attitude / Demeanor: Cooperative   Speech      Rate / Production: Normal/ Responsive      Volume:  Normal  volume      Language:  intact  Mood:   Normal  Affect:   Appropriate    Thought Content: Clear   Thought Process: Coherent       Associations: No loosening of associations  Insight:   Good   Judgment:  Intact   Orientation:  All  Attention/concentration: Good    Substance Use:   Patient did report a family history of substance use concerns; see medical history section for details.  Maternal Grandmother with alcohol abuse. Patient has not received chemical dependency treatment in the past.  Patient has not ever been to detox.      Patient is not currently receiving any chemical dependency treatment. Patient reported the following problems as a result of their substance use:   n/a .    Patient denies using alcohol.  Patient denies using tobacco.  Patient denies using cannabis.- will occasionally have a THC gummie  Patient reports using caffeine 1 times per day and drinks 1 at a time. Patient started using caffeine at age teen.  Patient reports using/abusing the following substance(s). Patient reported no  other substance use.     Substance Use: No symptoms    Based on the negative CAGE score and clinical interview there  are not indications of drug or alcohol abuse.    Significant Losses / Trauma / Abuse / Neglect Issues:   Patient did not  serve in the .  There are indications or report of significant loss, trauma, abuse or neglect issues related to: are no indications and client denies any losses, trauma, abuse, or neglect concerns.  Concerns for possible neglect are not present.     Safety Assessment:   Patient denies current homicidal ideation and behaviors.  Patient denies current self-injurious ideation and behaviors.    Patient denied risk behaviors associated with substance use.   Patient denies any high risk behaviors associated with mental health symptoms.  Patient reports the following current concerns for their personal safety: None.  Patient reports there are not firearms in the house.       There are no firearms in the home..    History of Safety Concerns:  Patient denied a history of homicidal ideation.     Patient denied a history of personal safety concerns.    Patient denied a history of assaultive behaviors.    Patient denied a history of sexual assault behaviors.     Patient denied a history of risk behaviors associated with substance use.  Patient denies any history of high risk behaviors associated with mental health symptoms.  Patient reports the following protective factors: safe and stable environment    Risk Plan:  See Recommendations for Safety and Risk Management Plan    Review of Symptoms per patient report:   Depression: No symptoms  Naida:  No Symptoms  Psychosis: No Symptoms  Anxiety: Excessive worry and Social anxiety  Panic:  No symptoms  Post Traumatic Stress Disorder:  No Symptoms   Eating Disorder: No Symptoms  ADD / ADHD:  No symptoms  Conduct Disorder: No symptoms  Autism Spectrum Disorder: No symptoms  Obsessive Compulsive Disorder: No Symptoms    Patient reports the  following compulsive behaviors and treatment history:  denies .      Diagnostic Criteria:   Unspecified Anxiety Disorder , Symptoms characteristic of an anxiety disorder that caused clinically significant distress or impairment in social, occupational, or other important areas of functioning predominate but do not meet the full criteria for any of the disorders of the anxiety disorders diagnostic class.    Functional Status:  Patient reports the following functional impairments:  social interactions.     Nonprogrammatic care:  Patient is requesting basic services to address current mental health concerns.    Clinical Summary:  1. Psychosocial, Cultural and Contextual Factors: school, living with parents, starting internship  .  2. Principal DSM5 Diagnoses  (Sustained by DSM5 Criteria Listed Above):   300.00 (F41.9) Unspecified Anxiety Disorder.  3. Other Diagnoses that is relevant to services:   296.35 (F33.41)  Major Depressive Disorder, Recurrent Episode, In partial remission _.  4. Provisional Diagnosis:  n/a  5. Prognosis: Relieve Acute Symptoms.  6. Likely consequences of symptoms if not treated: increased anxiety symtoms.  7. Client strengths include:  caring, educated, employed, insightful, intelligent, and support of family, friends and providers .     Recommendations:     1. Plan for Safety and Risk Management:   Safety and Risk: Recommended that patient call 911 or go to the local ED should there be a change in any of these risk factors..          Report to child / adult protection services was NA.     2. Patient's identified mental health concerns with a cultural influence will be addressed by continued discussion between [facility] and patient .     3. Initial Treatment will focus on:    Anxiety - some social anxiety symptoms and situational .     4. Resources/Service Plan:    services are not indicated.   Modifications to assist communication are not indicated.   Additional disability accommodations  are not indicated.      5. Collaboration:   Collaboration / coordination of treatment will be initiated with the following  support professionals:  PCP if needed .      6.  Referrals:   The following referral(s) will be initiated:  none at this time .       A Release of Information has been obtained for the following:  n/a .     Clinical Substantiation/medical necessity for the above recommendations:  Pt has continued to find short-term/As needed therapy beneficial.    7. QIANA:    QIANA:  Discussed the general effects of drugs and alcohol on health and well-being. Provider gave patient printed information about the  effects of chemical use on their health and well being. Recommendations:  n/a .     8. Records:   These were reviewed at time of assessment.   Information in this assessment was obtained from the medical record and  provided by patient who is a good historian.    Patient will have open access to their mental health medical record.    9.   Interactive Complexity: No    10. Safety Plan:  Patient denied any current/recent/lifetime history of suicidal ideation and/or behaviors.  No safety plan indicated at this time.     Provider Name/ Credentials:  KVNG Torres, [facility]   December 19, 2023

## 2024-01-04 ENCOUNTER — VIRTUAL VISIT (OUTPATIENT)
Dept: BEHAVIORAL HEALTH | Facility: CLINIC | Age: 19
End: 2024-01-04
Payer: COMMERCIAL

## 2024-01-04 DIAGNOSIS — F33.40 RECURRENT MAJOR DEPRESSIVE DISORDER, IN REMISSION (H): ICD-10-CM

## 2024-01-04 DIAGNOSIS — F41.9 ANXIETY DISORDER, UNSPECIFIED TYPE: Primary | ICD-10-CM

## 2024-01-04 PROCEDURE — 90834 PSYTX W PT 45 MINUTES: CPT | Mod: 95 | Performed by: SOCIAL WORKER

## 2024-01-04 NOTE — PROGRESS NOTES
[facility] Primary Care: Integrated Behavioral Health  January 4, 2024        Behavioral Health Clinician Progress Note    Patient Name: Mirna Carl           Service Type:  Individual      [facility] (patient reached)      Session Start Time: 10:03a   Session End Time:  10:47a      Session Length: 38 - 52      Attendees: Client     Service Modality:  Video Visit:      Provider verified identity through the following two step process.  Patient provided:  Patient is known previously to provider    Telemedicine Visit: The patient's condition can be safely assessed and treated via synchronous audio and visual telemedicine encounter.      Reason for Telemedicine Visit: Patient has requested telehealth visit    Originating Site (Patient Location): Patient's home    Distant Site (Provider Location): Provider Remote Setting- Home Office    Consent:  The patient/guardian has verbally consented to: the potential risks and benefits of telemedicine (video visit) versus in person care; bill my insurance or make self-payment for services provided; and responsibility for payment of non-covered services.     Patient would like the video invitation sent by:  My Chart    Mode of Communication:  Video Conference via [facility]    Distant Location (Provider):  Off-site    As the provider I attest to compliance with applicable laws and regulations related to telemedicine.    Visit Activities (Refresh list every visit): [facility] Only    Diagnostic Assessment Date: 12/19/23  Treatment Plan Review Date: 1/24/24  See Flowsheets for today's PHQ-9 and MATT-7 results  Previous PHQ-9:       10/23/2023     2:58 PM 11/1/2023    11:29 AM 12/5/2023    10:58 AM   PHQ-9 SCORE   PHQ-9 Total Score MyChart 0 0 0   PHQ-9 Total Score 0 0 0     Previous MATT-7:       12/14/2022    11:09 AM 7/12/2023     6:44 AM 11/1/2023    11:29 AM   MATT-7 SCORE   Total Score 0 (minimal anxiety) 0 (minimal anxiety) 0  (minimal anxiety)   Total Score 0 0 0       DATA  Extended Session (60+ minutes): No  Interactive Complexity: No  Crisis: No  [facility] Patient: No    Treatment Objective(s) Addressed in This Session:  Target Behavior(s):  anxiety    Anxiety: will experience a reduction in anxiety    Current Stressors / Issues:  Pt shares that she is doing well. Started her J term course and feels it is going well so far.  Likes to have just one class to focus on.    Is working as much as she can over break however there isn't a lot of options available.  Pt reports that finances are her biggest stressor right now.  Has car insurance and school fees due.  Pt and [facility] discussed ways she might be able to get bills paid without suffering consequences.    Internship date is unknown at this time.         Progress on Treatment Objective(s) / Homework:  Stable - ACTION (Actively working towards change); Intervened by reinforcing change plan / affirming steps taken    Motivational Interviewing    MI Intervention: Expressed Empathy/Understanding, Permission to raise concern or advise, Open-ended questions and Reflections: simple and complex     Change Talk Expressed by the Patient: Desire to change Committment to change    Provider Response to Change Talk: E - Evoked more info from patient about behavior change, A - Affirmed patient's thoughts, decisions, or attempts at behavior change, R - Reflected patient's change talk and S - Summarized patient's change talk statements      Care Plan review completed: Yes    Medication Review:  Changes to psychiatric medications, see updated Medication List in EPIC.      Medication Compliance:  Yes    Changes in Health Issues:   None reported    Chemical Use Review:   Substance Use: Chemical use reviewed, no active concerns identified      Tobacco Use: No current tobacco use.      Assessment: Current Emotional / Mental Status (status of significant symptoms):  Risk status (Self / Other harm or suicidal  ideation)  Patient denies a history of suicidal ideation, suicide attempts, self-injurious behavior, homicidal ideation, homicidal behavior and and other safety concerns  Patient denies current fears or concerns for personal safety.  Patient denies current or recent suicidal ideation or behaviors.  Patient denies current or recent homicidal ideation or behaviors.  Patient denies current or recent self injurious behavior or ideation.  Patient denies other safety concerns.  A safety and risk management plan has not been developed at this time, however patient was encouraged to call [place] [phone] should there be a change in any of these risk factors.    Appearance:   Appropriate   Eye Contact:   Good   Psychomotor Behavior: Normal   Attitude:   Cooperative   Orientation:   All  Speech   Rate / Production: Normal/ Responsive Normal    Volume:  Normal   Mood:    Normal  Affect:    Appropriate   Thought Content:  Clear   Thought Form:  Coherent  Logical   Insight:    Good     Diagnoses:  1. Anxiety disorder, unspecified type    2. Recurrent major depressive disorder, in remission (H24)        Collateral Reports Completed:  Not Applicable    Plan: (Homework, other):  Patient was given information about behavioral services and encouraged to schedule a follow up appointment with the clinic [facility] in 2 weeks.  She was also given information about mental health symptoms and treatment options .  CD Recommendations: No indications of CD issues.   Ligia Garcia, [facility], [facility]                                              Individual Treatment Plan    Patient's Name: Mirna Carl  YOB: 2005    Date of Creation: 1/11/23   Date Treatment Plan Last Reviewed/Revised: 10/24/23    DSM5 Diagnoses: 296.30 (F33.9) Major Depressive Disorder, Recurrent Episode, Unspecified _ and With anxious distress or 300.00 (F41.9) Unspecified Anxiety Disorder  Psychosocial / Contextual Factors: school stress, finding an  internship, relationship with father, finances  PROMIS (reviewed every 90 days):   PROMIS-10 Scores        7/10/2023     7:56 PM 9/18/2023     9:03 AM 12/18/2023     2:16 PM   PROMIS-10 Total Score w/o Sub Scores   PROMIS TOTAL - SUBSCORES 31 38 32        Referral / Collaboration:  Referral to another professional/service is not indicated at this time.    Anticipated number of session for this episode of care: 6-9 sessions  Anticipation frequency of session: Monthly  Anticipated Duration of each session: 38-52 minutes  Treatment plan will be reviewed in 90 days or when goals have been changed.       MeasurableTreatment Goal(s) related to diagnosis / functional impairment(s)  Goal 1: Patient will experience a reduction in anxious symptoms, along with a corresponding increase in relaxed emotional state.    I will know I've met my goal when anxious worries/fears decrease.      Objective #A (Patient Action)    Patient will use cognitive strategies identified in therapy to challenge anxious thoughts.  Status: Continued - Date(s):10/24/23     Intervention(s)  Therapist will utilize CBT and ACT to help pt challenge anxious thoughts and reduce intensity/duration of anxious distress.      Patient has reviewed and agreed to the above plan.        Ligia Garcia, ESVIN, LICSW, [facility]  January 4, 2024

## 2024-01-08 ENCOUNTER — MYC MEDICAL ADVICE (OUTPATIENT)
Dept: FAMILY MEDICINE | Facility: CLINIC | Age: 19
End: 2024-01-08
Payer: COMMERCIAL

## 2024-01-08 DIAGNOSIS — Z30.41 ENCOUNTER FOR SURVEILLANCE OF CONTRACEPTIVE PILLS: ICD-10-CM

## 2024-01-08 DIAGNOSIS — F50.819 BINGE EATING DISORDER: Primary | ICD-10-CM

## 2024-01-08 DIAGNOSIS — F90.2 ATTENTION DEFICIT HYPERACTIVITY DISORDER (ADHD), COMBINED TYPE: ICD-10-CM

## 2024-01-08 RX ORDER — LEVONORGESTREL/ETHIN.ESTRADIOL 0.1-0.02MG
TABLET ORAL
Qty: 84 TABLET | Refills: 1 | Status: SHIPPED | OUTPATIENT
Start: 2024-01-08 | End: 2024-06-24

## 2024-01-08 RX ORDER — LISDEXAMFETAMINE DIMESYLATE 30 MG/1
30 CAPSULE ORAL EVERY MORNING
Qty: 30 CAPSULE | Refills: 0 | Status: SHIPPED | OUTPATIENT
Start: 2024-01-08 | End: 2024-04-30

## 2024-01-08 NOTE — TELEPHONE ENCOUNTER
Routing to provider. Patient requesting that Vyvanse 30 mg sent to [facility] as [facility] in [place] no longer carries. Pended for review.

## 2024-01-09 ENCOUNTER — NURSE TRIAGE (OUTPATIENT)
Dept: NURSING | Facility: CLINIC | Age: 19
End: 2024-01-09

## 2024-01-09 ENCOUNTER — E-VISIT (OUTPATIENT)
Dept: FAMILY MEDICINE | Facility: CLINIC | Age: 19
End: 2024-01-09
Payer: COMMERCIAL

## 2024-01-09 DIAGNOSIS — J11.1 INFLUENZA-LIKE ILLNESS: Primary | ICD-10-CM

## 2024-01-09 PROCEDURE — 99207 PR NON-BILLABLE SERV PER CHARTING: CPT | Performed by: PEDIATRICS

## 2024-01-09 NOTE — TELEPHONE ENCOUNTER
Pt has sibling being seen by Dr. Ferraro and pt is interested in just getting Tx due to household testing pos for Influenza A.  Pt is submitting E-Visit for provider to address.

## 2024-01-09 NOTE — TELEPHONE ENCOUNTER
Pt calling with concerns about;    Exposed to flu -   Step dad just tested positive - lives with Pt   Symptoms began PM of1/8/24    Cough  Wheezing  Dizziness  Body aches    Denies;  Fever  Difficulty breathing/SOB  Too weak to stand  Nasal congestion  Signs/sx of dehydration    According to the protocol, patient should speak with a Provider today.  Care advice given. Patient verbalizes understanding and agrees with plan of care. Transferred to scheduling for virtual visit today    Karol Sheffield RN, Nurse Advisor 3:05 PM 1/9/2024  Reason for Disposition   Patient requests antiviral medicine for influenza and flu symptoms present < 48 hours    Additional Information   Negative: SEVERE difficulty breathing (e.g., struggling for each breath, speaks in single words)   Negative: Bluish (or gray) lips or face   Negative: Shock suspected (e.g., cold/pale/clammy skin, too weak to stand, low BP, rapid pulse)   Negative: Sounds like a life-threatening emergency to the triager   Negative: Symptoms of COVID-19 (e.g., cough, fever, SOB, or others) and lives in an area with community spread   Negative: Sounds like a cold and there is no fever   Negative: Cough and there is no fever   Negative: Severe cough   Negative: Throat pain and there is no fever   Negative: Severe sore throat   Negative: Influenza vaccine reaction is suspected   Negative: Headache and stiff neck (can't touch chin to chest)   Negative: Chest pain  (Exception: MILD central chest pain, present only when coughing.)   Negative: Difficulty breathing that is not severe and not relieved by cleaning out the nose   Negative: Patient sounds very sick or weak to the triager   Negative: Fever > 104 F (40 C)   Negative: Fever > 101 F (38.3 C) and over 60 years of age   Negative: Fever > 100.0 F (37.8 C) and diabetes mellitus or weak immune system (e.g., HIV positive, cancer chemo, splenectomy, organ transplant, chronic steroids)   Negative: Fever > 100.0 F (37.8 C) and  bedridden (e.g., CVA, chronic illness, recovering from surgery)   Negative: Using nasal washes and pain medicine > 24 hours and sinus pain (lower forehead, cheekbone, or eye) persists   Negative: Fever present > 3 days (72 hours)   Negative: Fever returns after gone for over 24 hours and symptoms worse (or not improved)   Negative: Earache   Negative: Patient wants to be seen   Negative: HIGH RISK (e.g., age > 64 years, pregnant, HIV+, chronic medical condition) and flu symptoms    Protocols used: Influenza (Flu) - Seasonal-A-[place]

## 2024-01-10 NOTE — TELEPHONE ENCOUNTER
Provider E-Visit time total (minutes): 5 minutes    Patient initiated E visit but did not complete.     Kaylynn Ferraro MD on 1/11/2024 at 1:28 PM

## 2024-01-18 ENCOUNTER — VIRTUAL VISIT (OUTPATIENT)
Dept: BEHAVIORAL HEALTH | Facility: CLINIC | Age: 19
End: 2024-01-18
Payer: COMMERCIAL

## 2024-01-18 DIAGNOSIS — F41.9 ANXIETY DISORDER, UNSPECIFIED TYPE: Primary | ICD-10-CM

## 2024-01-18 DIAGNOSIS — F33.40 RECURRENT MAJOR DEPRESSIVE DISORDER, IN REMISSION (H): ICD-10-CM

## 2024-01-18 PROCEDURE — 90834 PSYTX W PT 45 MINUTES: CPT | Mod: 95 | Performed by: SOCIAL WORKER

## 2024-01-22 ENCOUNTER — PATIENT OUTREACH (OUTPATIENT)
Dept: CARE COORDINATION | Facility: CLINIC | Age: 19
End: 2024-01-22
Payer: COMMERCIAL

## 2024-01-23 NOTE — PROGRESS NOTES
[facility] Primary Care: Integrated Behavioral Health  January 18, 2024        Behavioral Health Clinician Progress Note    Patient Name: Mirna Carl           Service Type:  Individual      [facility] ([facility] reached[facility]      Session Start Time: 12:00p   Session End Time:  12:45p      Session Length: 38 - 52      Attendees: Client     Service Modality:  Video Visit:      Provider verified identity through the following two step process.  Patient provided:  Patient is known previously to provider    Telemedicine Visit: The patient's condition can be safely assessed and treated via synchronous audio and visual telemedicine encounter.      Reason for Telemedicine Visit: Patient has requested telehealth visit    Originating Site (Patient Location): Patient's home    Distant Site (Provider Location): Provider Remote Setting- Home Office    Consent:  The patient/guardian has verbally consented to: the potential risks and benefits of telemedicine (video visit) versus in person care; bill my insurance or make self-payment for services provided; and responsibility for payment of non-covered services.     Patient would like the video invitation sent by:  My Chart    Mode of Communication:  Video Conference via [facility]    Distant Location (Provider):  Off-site    As the provider I attest to compliance with applicable laws and regulations related to telemedicine.    Visit Activities (Refresh list every visit): [facility] Only    Diagnostic Assessment Date: 12/19/23  Treatment Plan Review Date: 1/24/24  See Flowsheets for today's PHQ-9 and MATT-7 results  Previous PHQ-9:       11/1/2023    11:29 AM 12/5/2023    10:58 AM 1/18/2024    11:59 AM   PHQ-9 SCORE   PHQ-9 Total Score MyChart 0 0 0   PHQ-9 Total Score 0 0 0     Previous MATT-7:       12/14/2022    11:09 AM 7/12/2023     6:44 AM 11/1/2023    11:29 AM   MATT-7 SCORE   Total Score 0 (minimal anxiety) 0 (minimal anxiety) 0  (minimal anxiety)   Total Score 0 0 0       DATA  Extended Session (60+ minutes): No  Interactive Complexity: No  Crisis: No  [facility] Patient: No    Treatment Objective(s) Addressed in This Session:  Target Behavior(s):  anxiety    Anxiety: will experience a reduction in anxiety    Current Stressors / Issues:  Pt reports that she is doing ok.  Most of her family had the flu last week.  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  Continues to worry a little about finances though was able to get car insurance paid off.  Is planning to talk with mother today to see if she can possibly borrow money to pay for J-term class and pt will pay them back.  Pt shares that if she doesn't pay for J-term class she can't add any classes for Spring semester and there is a class she really needs to take.    Had a meeting with boss to talk about possible job options for summer.  Is looking forward to internship and Spring semester though has some excited anxiety.      Progress on Treatment Objective(s) / Homework:  Stable - ACTION (Actively working towards change); Intervened by reinforcing change plan / affirming steps taken    Motivational Interviewing    MI Intervention: Expressed Empathy/Understanding, Permission to raise concern or advise, Open-ended questions and Reflections: simple and complex     Change Talk Expressed by the Patient: Desire to change Committment to change    Provider Response to Change Talk: E - Evoked more info from patient about behavior change, A - Affirmed patient's thoughts, decisions, or attempts at behavior change, R - Reflected patient's change talk and S - Summarized patient's change talk statements      Care Plan review completed: Yes    Medication Review:  Changes to psychiatric medications, see updated Medication List in EPIC.      Medication Compliance:  Yes    Changes in Health Issues:   None reported    Chemical Use Review:   Substance Use:  Chemical use reviewed, no active concerns identified      Tobacco Use: No current tobacco use.      Assessment: Current Emotional / Mental Status (status of significant symptoms):  Risk status (Self / Other harm or suicidal ideation)  Patient denies a history of suicidal ideation, suicide attempts, self-injurious behavior, homicidal ideation, homicidal behavior and and other safety concerns  Patient denies current fears or concerns for personal safety.  Patient denies current or recent suicidal ideation or behaviors.  Patient denies current or recent homicidal ideation or behaviors.  Patient denies current or recent self injurious behavior or ideation.  Patient denies other safety concerns.  A safety and risk management plan has not been developed at this time, however patient was encouraged to call [place] [phone] should there be a change in any of these risk factors.    Appearance:   Appropriate   Eye Contact:   Good   Psychomotor Behavior: Normal   Attitude:   Cooperative   Orientation:   All  Speech   Rate / Production: Normal/ Responsive Normal    Volume:  Normal   Mood:    Normal  Affect:    Appropriate   Thought Content:  Clear   Thought Form:  Coherent  Logical   Insight:    Good     Diagnoses:  1. Anxiety disorder, unspecified type    2. Recurrent major depressive disorder, in remission (H24)        Collateral Reports Completed:  Not Applicable    Plan: (Homework, other):  Patient was given information about behavioral services and encouraged to schedule a follow up appointment with the clinic [facility] in 2 weeks.  She was also given information about mental health symptoms and treatment options .  CD Recommendations: No indications of CD issues.   KVNG Torres, [facility]                                              Individual Treatment Plan    Patient's Name: Mirna Carl  YOB: 2005    Date of Creation: 1/11/23   Date Treatment Plan Last Reviewed/Revised: 10/24/23    DSM5  Diagnoses: 296.30 (F33.9) Major Depressive Disorder, Recurrent Episode, Unspecified _ and With anxious distress or 300.00 (F41.9) Unspecified Anxiety Disorder  Psychosocial / Contextual Factors: school stress, finding an internship, relationship with father, finances  PROMIS (reviewed every 90 days):   PROMIS-10 Scores        7/10/2023     7:56 PM 9/18/2023     9:03 AM 12/18/2023     2:16 PM   PROMIS-10 Total Score w/o Sub Scores   PROMIS TOTAL - SUBSCORES 31 38 32        Referral / Collaboration:  Referral to another professional/service is not indicated at this time.    Anticipated number of session for this episode of care: 6-9 sessions  Anticipation frequency of session: Monthly  Anticipated Duration of each session: 38-52 minutes  Treatment plan will be reviewed in 90 days or when goals have been changed.       MeasurableTreatment Goal(s) related to diagnosis / functional impairment(s)  Goal 1: Patient will experience a reduction in anxious symptoms, along with a corresponding increase in relaxed emotional state.    I will know I've met my goal when anxious worries/fears decrease.      Objective #A (Patient Action)    Patient will use cognitive strategies identified in therapy to challenge anxious thoughts.  Status: Continued - Date(s):10/24/23     Intervention(s)  Therapist will utilize CBT and ACT to help pt challenge anxious thoughts and reduce intensity/duration of anxious distress.      Patient has reviewed and agreed to the above plan.        Ligia Garcia, ESVIN, LICSW, [facility]  January 18, 2024

## 2024-01-29 PROBLEM — E78.01 FAMILIAL HYPERCHOLESTEREMIA: Status: ACTIVE | Noted: 2024-01-29

## 2024-01-29 NOTE — PROGRESS NOTES
HEART CARE ENCOUNTER CONSULTATON NOTE      [facility]  [phone]      Assessment/Recommendations   Assessment:   Severe hyperlipidemia, possible heterozygotes familiar hyperlipidemia.  TC: 300, LDL:233, HDL: 46.  No corneal arcus. No xanthomata.   2.  Family history CVA, Mother <56 yo.   3.   Obesity: 42.     Plan:   Recommend intensive lipid lowering given likely diagnosis of heterozygous FH.   2.  Start Crestor 40 mg daily, check lipids in 3 months, if not at goal LDL <70, then Zetia 10 mg daily.    3.  Pregnancy and statins discussed would hold statins if she decides to proceed with childbirth in the future  4.  Discussed dietary modifications  5.  Discussed indications for PCSK9 inhibitors         History of Present Illness/Subjective    HPI: Mirna Carl is a 18 year old female longstanding history of elevation in her lipids dating back to age of 13 who presents to cardiology clinic in consultation for hyperlipidemia.    Patient was diagnosed with hyperlipidemia at age 13.  She does not have a formal diagnosis of familial hypercholesterolemia but likely has this diagnosis based on her lipid profile and family history.  Her mother had a CVA and has severe hyperlipidemia.  He had cerebrovascular disease at the age of less than 55.  Mother is on statin therapy.  She does not know her father's history or her siblings history.    Currently she denies any anginal chest pain, dyspnea on exertion.  She has no history of xanthomas.    Reviewed serial lipids with the patient.  Reviewed indications for treatment of hyperlipidemia.  Her LDL is greater than 190.  Would target LDL less than 70 given her risk factor given likely heterozygous familial hypercholesterolemia       Physical Examination  Review of Systems   Vitals: There were no vitals taken for this visit.  BMI= There is no height or weight on file to calculate BMI.  Wt Readings from Last 3 Encounters:   11/02/23 98.9 kg (218 lb)    07/14/23 98 kg (216 lb 1.6 oz)   07/12/23 97.5 kg (215 lb)        Pleasant, obese   ENT/Mouth: membranes moist, no oral lesions or bleeding gums.      EYES:  no scleral icterus, normal conjunctivae.  Cornea normal.  No evidence of hyperlipidemia       Chest/Lungs:   lungs are clear to auscultation, no rales or wheezing, no sternal scar, equal chest wall expansion    Cardiovascular:   Regular. Normal first and second heart sounds with no murmurs, rubs, or gallops; the carotid, radial and posterior tibial pulses are intact, Jugular venous pressure normal, no edema bilaterally    Abdomen:  no tenderness; bowel sounds are present   Extremities: no cyanosis or clubbing   Skin: no xanthelasma on Achilles or eyelids, warm.    Neurologic: normal  bilateral, no tremors     Psychiatric: alert and oriented x3, calm        Please refer above for cardiac ROS details.        Medical History  Surgical History Family History Social History   Past Medical History:   Diagnosis Date    Anxiety disorder 03/28/2022    Depressive disorder     Hashimoto's thyroiditis 03/28/2022    Moderate episode of recurrent major depressive disorder (H) 03/28/2022    Seasonal allergic rhinitis 03/28/2022    Uncomplicated asthma      No past surgical history on file.  Family History   Problem Relation Age of Onset    Hyperlipidemia Mother     Cerebrovascular Disease Mother     Anxiety Disorder Mother     Alcoholism Maternal Grandmother     Hyperlipidemia Maternal Grandmother         Yeni        Social History     Socioeconomic History    Marital status: Single     Spouse name: Not on file    Number of children: Not on file    Years of education: Not on file    Highest education level: Not on file   Occupational History    Not on file   Tobacco Use    Smoking status: Never     Passive exposure: Never    Smokeless tobacco: Never   Vaping Use    Vaping Use: Never used   Substance and Sexual Activity    Alcohol use: Never     Comment: very rare     Drug use: Not Currently     Comment: gummies occasionally    Sexual activity: Not Currently     Partners: Female, Male     Birth control/protection: Condom, Pill   Other Topics Concern    Parent/sibling w/ CABG, MI or angioplasty before 65F 55M? No   Social History Narrative    Not on file     Social Determinants of Health     Financial Resource Strain: Low Risk  (10/26/2023)    Financial Resource Strain     Within the past 12 months, have you or your family members you live with been unable to get utilities (heat, electricity) when it was really needed?: No   Food Insecurity: Low Risk  (10/26/2023)    Food Insecurity     Within the past 12 months, did you worry that your food would run out before you got money to buy more?: No     Within the past 12 months, did the food you bought just not last and you didn t have money to get more?: No   Transportation Needs: Low Risk  (10/26/2023)    Transportation Needs     Within the past 12 months, has lack of transportation kept you from medical appointments, getting your medicines, non-medical meetings or appointments, work, or from getting things that you need?: No   Physical Activity: Not on file   Stress: Not on file   Social Connections: Not on file   Interpersonal Safety: Not on file   Housing Stability: Low Risk  (10/26/2023)    Housing Stability     Do you have housing? : Yes     Are you worried about losing your housing?: No           Medications  Allergies   Current Outpatient Medications   Medication Sig Dispense Refill    cetirizine (ZYRTEC) 10 MG tablet Take 1 tablet (10 mg) by mouth daily 90 tablet 3    FLUoxetine (PROZAC) 40 MG capsule Take 1 capsule (40 mg) by mouth daily 90 capsule 3    levonorgestrel-ethinyl estradiol (AVIANE) 0.1-20 MG-MCG tablet , TAKE 1 TABLET BY MOUTH DAILY 84 tablet 1    lisdexamfetamine (VYVANSE) 30 MG capsule Take 1 capsule (30 mg) by mouth every morning 30 capsule 0       Allergies   Allergen Reactions    Horse Protein Cough,  "Difficulty breathing, Fatigue, Headache, Hives, Itching and Shortness Of Breath          Lab Results    Chemistry/lipid CBC Cardiac Enzymes/BNP/TSH/INR   Recent Labs   Lab Test 12/05/23  0938 06/20/22  0913 06/14/18  0822   CHOL 300*   < > 223*   HDL 46*   < > 51   *   < > 156*   TRIG 104   < > 61   CHOLHDLRATIO  --   --  4.4    < > = values in this interval not displayed.     Recent Labs   Lab Test 12/05/23  0938 02/21/23  1436 06/20/22  0913   * 269* 226*     Recent Labs   Lab Test 12/05/23  0938      POTASSIUM 4.1   CHLORIDE 106   CO2 22   GLC 92   BUN 8.7   CR 0.58   GFRESTIMATED >90   MANUELA 8.8     Recent Labs   Lab Test 12/05/23  0938 06/14/18  0822   CR 0.58 0.60     Recent Labs   Lab Test 02/21/23  1436 06/20/22  0913   A1C 5.2 5.4          Recent Labs   Lab Test 07/03/20  0932   WBC 5.8   HGB 12.9   HCT 38.7   MCV 86.8        Recent Labs   Lab Test 07/03/20  0932   HGB 12.9    No results for input(s): \"TROPONINI\" in the last 93861 hours.  No results for input(s): \"BNP\", \"NTBNPI\", \"NTBNP\" in the last 20022 hours.  Recent Labs   Lab Test 12/05/23  0938   TSH 4.14     No results for input(s): \"INR\" in the last 26672 hours.     Scott Alonso DO    Patient will require longitudinal management of her hyperlipidemia.  Will repeat lipids in 3 months.  Will work with medications to obtain LDL less than 70.                                  "

## 2024-01-30 ENCOUNTER — OFFICE VISIT (OUTPATIENT)
Dept: CARDIOLOGY | Facility: CLINIC | Age: 19
End: 2024-01-30
Payer: COMMERCIAL

## 2024-01-30 VITALS
WEIGHT: 230.9 LBS | RESPIRATION RATE: 16 BRPM | DIASTOLIC BLOOD PRESSURE: 74 MMHG | SYSTOLIC BLOOD PRESSURE: 112 MMHG | HEIGHT: 62 IN | HEART RATE: 68 BPM | BODY MASS INDEX: 42.49 KG/M2

## 2024-01-30 DIAGNOSIS — E78.5 DYSLIPIDEMIA WITH ELEVATED LOW DENSITY LIPOPROTEIN (LDL) CHOLESTEROL AND ABNORMALLY LOW HIGH DENSITY LIPOPROTEIN (HDL) CHOLESTEROL: ICD-10-CM

## 2024-01-30 DIAGNOSIS — Z82.3 FAMILY HISTORY OF CEREBROVASCULAR ACCIDENT (CVA) IN MOTHER: ICD-10-CM

## 2024-01-30 DIAGNOSIS — E78.01 FAMILIAL HYPERCHOLESTEREMIA: Primary | ICD-10-CM

## 2024-01-30 PROCEDURE — G2211 COMPLEX E/M VISIT ADD ON: HCPCS | Performed by: INTERNAL MEDICINE

## 2024-01-30 PROCEDURE — 99204 OFFICE O/P NEW MOD 45 MIN: CPT | Performed by: INTERNAL MEDICINE

## 2024-01-30 RX ORDER — ROSUVASTATIN CALCIUM 40 MG/1
40 TABLET, COATED ORAL DAILY
Qty: 90 TABLET | Refills: 3 | Status: SHIPPED | OUTPATIENT
Start: 2024-01-30

## 2024-01-30 NOTE — LETTER
1/30/2024    Kaylynn Ferraro MD  [address] [facility] [address]    RE: Mirna Carl       Dear Colleague,     I had the pleasure of seeing Mirna Carl in the [facility].    HEART CARE ENCOUNTER CONSULTATON NOTE      [facility]  [phone]      Assessment/Recommendations   Assessment:   Severe hyperlipidemia, possible heterozygotes familiar hyperlipidemia.  TC: 300, LDL:233, HDL: 46.  No corneal arcus. No xanthomata.   2.  Family history CVA, Mother <54 yo.   3.   Obesity: 42.     Plan:   Recommend intensive lipid lowering given likely diagnosis of heterozygous FH.   2.  Start Crestor 40 mg daily, check lipids in 3 months, if not at goal LDL <70, then Zetia 10 mg daily.    3.  Pregnancy and statins discussed would hold statins if she decides to proceed with childbirth in the future  4.  Discussed dietary modifications  5.  Discussed indications for PCSK9 inhibitors         History of Present Illness/Subjective    HPI: Mirna Carl is a 18 year old female longstanding history of elevation in her lipids dating back to age of 13 who presents to cardiology clinic in consultation for hyperlipidemia.    Patient was diagnosed with hyperlipidemia at age 13.  She does not have a formal diagnosis of familial hypercholesterolemia but likely has this diagnosis based on her lipid profile and family history.  Her mother had a CVA and has severe hyperlipidemia.  He had cerebrovascular disease at the age of less than 55.  Mother is on statin therapy.  She does not know her father's history or her siblings history.    Currently she denies any anginal chest pain, dyspnea on exertion.  She has no history of xanthomas.    Reviewed serial lipids with the patient.  Reviewed indications for treatment of hyperlipidemia.  Her LDL is greater than 190.  Would target LDL less than 70 given her risk factor given likely heterozygous familial hypercholesterolemia        Physical Examination  Review of Systems   Vitals: There were no vitals taken for this visit.  BMI= There is no height or weight on file to calculate BMI.  Wt Readings from Last 3 Encounters:   11/02/23 98.9 kg (218 lb)   07/14/23 98 kg (216 lb 1.6 oz)   07/12/23 97.5 kg (215 lb)        Pleasant, obese   ENT/Mouth: membranes moist, no oral lesions or bleeding gums.      EYES:  no scleral icterus, normal conjunctivae.  Cornea normal.  No evidence of hyperlipidemia       Chest/Lungs:   lungs are clear to auscultation, no rales or wheezing, no sternal scar, equal chest wall expansion    Cardiovascular:   Regular. Normal first and second heart sounds with no murmurs, rubs, or gallops; the carotid, radial and posterior tibial pulses are intact, Jugular venous pressure normal, no edema bilaterally    Abdomen:  no tenderness; bowel sounds are present   Extremities: no cyanosis or clubbing   Skin: no xanthelasma on Achilles or eyelids, warm.    Neurologic: normal  bilateral, no tremors     Psychiatric: alert and oriented x3, calm        Please refer above for cardiac ROS details.        Medical History  Surgical History Family History Social History   Past Medical History:   Diagnosis Date    Anxiety disorder 03/28/2022    Depressive disorder     Hashimoto's thyroiditis 03/28/2022    Moderate episode of recurrent major depressive disorder (H) 03/28/2022    Seasonal allergic rhinitis 03/28/2022    Uncomplicated asthma      No past surgical history on file.  Family History   Problem Relation Age of Onset    Hyperlipidemia Mother     Cerebrovascular Disease Mother     Anxiety Disorder Mother     Alcoholism Maternal Grandmother     Hyperlipidemia Maternal Grandmother         Yeni        Social History     Socioeconomic History    Marital status: Single     Spouse name: Not on file    Number of children: Not on file    Years of education: Not on file    Highest education level: Not on file   Occupational History     Not on file   Tobacco Use    Smoking status: Never     Passive exposure: Never    Smokeless tobacco: Never   Vaping Use    Vaping Use: Never used   Substance and Sexual Activity    Alcohol use: Never     Comment: very rare    Drug use: Not Currently     Comment: gummies occasionally    Sexual activity: Not Currently     Partners: Female, Male     Birth control/protection: Condom, Pill   Other Topics Concern    Parent/sibling w/ CABG, MI or angioplasty before 65F 55M? No   Social History Narrative    Not on file     Social Determinants of Health     Financial Resource Strain: Low Risk  (10/26/2023)    Financial Resource Strain     Within the past 12 months, have you or your family members you live with been unable to get utilities (heat, electricity) when it was really needed?: No   Food Insecurity: Low Risk  (10/26/2023)    Food Insecurity     Within the past 12 months, did you worry that your food would run out before you got money to buy more?: No     Within the past 12 months, did the food you bought just not last and you didn t have money to get more?: No   Transportation Needs: Low Risk  (10/26/2023)    Transportation Needs     Within the past 12 months, has lack of transportation kept you from medical appointments, getting your medicines, non-medical meetings or appointments, work, or from getting things that you need?: No   Physical Activity: Not on file   Stress: Not on file   Social Connections: Not on file   Interpersonal Safety: Not on file   Housing Stability: Low Risk  (10/26/2023)    Housing Stability     Do you have housing? : Yes     Are you worried about losing your housing?: No           Medications  Allergies   Current Outpatient Medications   Medication Sig Dispense Refill    cetirizine (ZYRTEC) 10 MG tablet Take 1 tablet (10 mg) by mouth daily 90 tablet 3    FLUoxetine (PROZAC) 40 MG capsule Take 1 capsule (40 mg) by mouth daily 90 capsule 3    levonorgestrel-ethinyl estradiol (AVIANE) 0.1-20  "MG-MCG tablet , TAKE 1 TABLET BY MOUTH DAILY 84 tablet 1    lisdexamfetamine (VYVANSE) 30 MG capsule Take 1 capsule (30 mg) by mouth every morning 30 capsule 0       Allergies   Allergen Reactions    Horse Protein Cough, Difficulty breathing, Fatigue, Headache, Hives, Itching and Shortness Of Breath          Lab Results    Chemistry/lipid CBC Cardiac Enzymes/BNP/TSH/INR   Recent Labs   Lab Test 12/05/23  0938 06/20/22  0913 06/14/18  0822   CHOL 300*   < > 223*   HDL 46*   < > 51   *   < > 156*   TRIG 104   < > 61   CHOLHDLRATIO  --   --  4.4    < > = values in this interval not displayed.     Recent Labs   Lab Test 12/05/23  0938 02/21/23  1436 06/20/22  0913   * 269* 226*     Recent Labs   Lab Test 12/05/23 0938      POTASSIUM 4.1   CHLORIDE 106   CO2 22   GLC 92   BUN 8.7   CR 0.58   GFRESTIMATED >90   MANUELA 8.8     Recent Labs   Lab Test 12/05/23  0938 06/14/18  0822   CR 0.58 0.60     Recent Labs   Lab Test 02/21/23  1436 06/20/22  0913   A1C 5.2 5.4          Recent Labs   Lab Test 07/03/20  0932   WBC 5.8   HGB 12.9   HCT 38.7   MCV 86.8        Recent Labs   Lab Test 07/03/20  0932   HGB 12.9    No results for input(s): \"TROPONINI\" in the last 53085 hours.  No results for input(s): \"BNP\", \"NTBNPI\", \"NTBNP\" in the last 43168 hours.  Recent Labs   Lab Test 12/05/23  0938   TSH 4.14     No results for input(s): \"INR\" in the last 06667 hours.     Scott Alonso DO    Patient will require longitudinal management of her hyperlipidemia.  Will repeat lipids in 3 months.  Will work with medications to obtain LDL less than 70.      Thank you for allowing me to participate in the care of your patient.      Sincerely,     Scott Alonso DO     [facility]  cc:   Kaylynn Ferraro MD  [address] [place] [address]      "

## 2024-01-30 NOTE — PATIENT INSTRUCTIONS
Please contact direct nurses line Monday through Friday 8 AM to 5 PM @ [phone]    After-hours contact cardiology office at [phone].    Dx: Likely Heterozygous familiar hypercholesterolemia.      Plan:  Start rosuvastatin 40 mg daily  Check lipids in 3 months  Follow-up in clinic in 3 months.

## 2024-02-01 ENCOUNTER — VIRTUAL VISIT (OUTPATIENT)
Dept: BEHAVIORAL HEALTH | Facility: CLINIC | Age: 19
End: 2024-02-01
Payer: COMMERCIAL

## 2024-02-01 DIAGNOSIS — F41.9 ANXIETY DISORDER, UNSPECIFIED TYPE: Primary | ICD-10-CM

## 2024-02-01 DIAGNOSIS — F33.40 RECURRENT MAJOR DEPRESSIVE DISORDER, IN REMISSION (H): ICD-10-CM

## 2024-02-01 PROCEDURE — 90832 PSYTX W PT 30 MINUTES: CPT | Mod: 95 | Performed by: SOCIAL WORKER

## 2024-02-02 NOTE — PROGRESS NOTES
Patient:   LESLY ECKERT            MRN: [id number]            FIN: [id number]               Age:   12 years     Sex:  Female     :  2005   Associated Diagnoses:   Chronic cough   Author:   Kaylynn Ferraro MD      Chief Complaint   2017 12:54 PM CST  Pt here today w/ mom for cough and wheezing. Allergies are getting worse. Symptoms started few weeks.        History of Present Illness   Chief complaint and symptoms as noted above and confirmed with patient.  Here today with mom.  Two weeks of cold symptoms.  Always seems to have a cough. Has heard her wheezing at rest.  Right now thinks she has a cold on top of it but the breathing problems are a concern.  Is uncomfortable.  No fever.  Has headaches.  Cough is the worst after she sits up from laying in bed.    Always has a cough.  Comes and goes.  Mom is concerned it is allergy related.  Allergy med doesn't seem to help.  When she goes out to the barn horses will make her sneeze and cough up a storm.  Does cough at night twice per week. Has not been using her nasal steroid unless she is going to the barn- but does not seem to be helping much.   Bio dad has allergies.  No known asthma.        Review of Systems   All other systems are negative      Health Status   Allergies:    Allergic Reactions (Selected)  No known allergies   Medications:  (Selected)   Prescriptions  Prescribed  FLUoxetine 20 mg oral capsule: 1 cap(s) ( 20 mg ), po, daily, # 30 cap(s), 3 Refill(s), Type: Soft Stop, Pharmacy: [id number], Due for visit, 1 cap(s) po daily  Documented Medications  Documented  ZyrTEC 10 mg oral tablet: 1 tab(s) ( 10 mg ), po, daily, 0 Refill(s), Type: Maintenance  multivitamin with minerals (w/ Iron): 0 Refill(s), Type: Maintenance   Problem list:    All Problems  Obesity / [id number] / Probable      Histories   Past Medical History:    No active or resolved past medical history items have been selected or recorded.   Family History:     Alcohol abuse  Grandmother (M)  Hypercholesterolemia  Mother  Grandparent     Procedure history:    No active procedure history items have been selected or recorded.   Social History:        Alcohol Assessment            Never      Tobacco Assessment            Household tobacco concerns: No.      Home and Environment Assessment            Lives with Mother, Siblings, stepfather.  Risks in environment: Gun in the home..        Physical Examination   Vital Signs   11/28/2017 12:54 PM CST Temperature Tympanic 99.3 DegF    Peripheral Pulse Rate 99 bpm  HI    HR Method Manual    Systolic Blood Pressure 102 mmHg    Diastolic Blood Pressure 72 mmHg    Mean Arterial Pressure 82 mmHg    BP Site Right arm    BP Method Manual    Oxygen Saturation 96 %      Measurements from flowsheet : Measurements   11/28/2017 12:54 PM CST Height Measured - Metric 155.4 cm    Weight Measured - Metric 76.1 kg    BSA - Metric 1.81 m2    Body Mass Index - Metric 31.51 kg/m2    Body Mass Index Percentile 97.88      Vital signs as noted above   General:  Alert and oriented.    Eye:  Pupils are equal, round and reactive to light, Extraocular movements are intact.    HENT:  Tympanic membranes are clear, Oral mucosa is moist, No pharyngeal erythema.    Neck:  Few anterior nodes..    Respiratory:    Fair air movement with end expiratory wheezes bilaterally.  No retractions.  .    Cardiovascular:  S1 and S2 with regular rate and rhythm.  No murmurs.  Pulses 2+ in all four extremities.  Brisk capillary refill.  .       Impression and Plan   Diagnosis     Chronic cough ([id number]).     Plan:  Prednisone 40 mg today followed by 20 mg daily ×4 more days.  Begin Qvar 2 puffs twice daily with spacer.  Albuterol 4-6 puffs every 4 hours as needed for coughing or wheeze.  Discussed ongoing use of nasal steroid.  Return to clinic in 2 weeks for allergy testing and spirometry.  Monitor closely for respiratory distress or other changes.  Should return to  clinic or ER if worsening in any way.  .    Orders     Orders (Selected)   Prescriptions  Prescribed  Qvar with Dose Counter 40 mcg/inh inhalation aerosol: 2 puff(s), inh, bid, # 1 EA, 0 Refill(s), Type: Maintenance, Pharmacy: [facility] [id number], 2 puff(s) inh bid  albuterol 90 mcg/inh inhalation aerosol: See Instructions, Instructions: 4-6 puffs with chamber every 4 hours as needed for cough or wheeze, # 1 EA, 0 Refill(s), Type: Maintenance, Pharmacy: [facility] [id number], do not fill unless mom requests, 4-6 puffs with chamber every 4 hours as...  predniSONE 20 mg oral tablet: See Instructions, Instructions: 2 tabs by mouth today, then 1 tab by mouth daily x 4 days., # 6 tab(s), 0 Refill(s), Type: Maintenance, Pharmacy: [facility] [id number], 2 tabs by mouth today, then 1 tab by mouth daily x 4 days..   never

## 2024-02-02 NOTE — PROGRESS NOTES
[facility] Primary Care: Integrated Behavioral Health  February 1, 2024          Behavioral Health Clinician Progress Note    Patient Name: Mirna Carl           Service Type:  Individual      Service Location:   MyChart / Email (patient reached)      Session Start Time: 12:30p   Session End Time:  1:02p      Session Length: 16 - 37      Attendees: Client     Service Modality:  Video Visit:      Provider verified identity through the following two step process.  Patient provided:  Patient is known previously to provider    Telemedicine Visit: The patient's condition can be safely assessed and treated via synchronous audio and visual telemedicine encounter.      Reason for Telemedicine Visit: Patient has requested telehealth visit    Originating Site (Patient Location): Patient's home    Distant Site (Provider Location): [facility]    Consent:  The patient/guardian has verbally consented to: the potential risks and benefits of telemedicine (video visit) versus in person care; bill my insurance or make self-payment for services provided; and responsibility for payment of non-covered services.     Patient would like the video invitation sent by:  My Chart    Mode of Communication:  Video Conference via [facility]    Distant Location (Provider):  On-site    As the provider I attest to compliance with applicable laws and regulations related to telemedicine.    Visit Activities (Refresh list every visit): [facility] Only    Diagnostic Assessment Date: 12/19/23  Treatment Plan Review Date: 5/1/24  See Flowsheets for today's PHQ-9 and MATT-7 results  Previous PHQ-9:       11/1/2023    11:29 AM 12/5/2023    10:58 AM 1/18/2024    11:59 AM   PHQ-9 SCORE   PHQ-9 Total Score MyChart 0 0 0   PHQ-9 Total Score 0 0 0     Previous MATT-7:       12/14/2022    11:09 AM 7/12/2023     6:44 AM 11/1/2023    11:29 AM   MATT-7 SCORE   Total Score 0 (minimal anxiety) 0 (minimal anxiety) 0  "(minimal anxiety)   Total Score 0 0 0       DATA  Extended Session (60+ minutes): No  Interactive Complexity: No  Crisis: No  [facility] Patient: No    Treatment Objective(s) Addressed in This Session:  Target Behavior(s):  anxiety    Anxiety: will experience a reduction in anxiety    Current Stressors / Issues:  Pt shares that she is doing quite well.  Started Spring semester and feels that so far it is going pretty good.  Does feel that her classes are going to be interesting.  Started internship as well with [employer] and feels that she will learn a lot though is fairly certain she doesn't want to be a .  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    Pt shares that she is feeling a bit better in regards to finances.  Did get some money back for taxes and she was able to register for all the classes she needed to and will make payments to the school for her J-term class.  Will mainly work weekends this Spring while in school and will work full time hours this summer and have opportunity to save money.    Did have a cardiologist appointment and feels it went well.  She was happy that the provider didn't make her feel that the cholesterol was \"all her fault\".  She shares that the provider feels it is highly a familial component.  Pt was started on medication and will follow up in a few months.  Reports that mood has been stable and denies specific mood symptoms or concerns at this time.      Progress on Treatment Objective(s) / Homework:  Stable - ACTION (Actively working towards change); Intervened by reinforcing change plan / affirming steps taken    Motivational Interviewing    MI Intervention: Expressed Empathy/Understanding, Permission to raise concern or advise, Open-ended questions and Reflections: simple and complex     Change Talk Expressed by the Patient: Desire to change Committment to change    Provider Response to Change Talk: E " - Evoked more info from patient about behavior change, A - Affirmed patient's thoughts, decisions, or attempts at behavior change, R - Reflected patient's change talk and S - Summarized patient's change talk statements      Care Plan review completed: Yes    Medication Review:  Changes to psychiatric medications, see updated Medication List in EPIC.      Medication Compliance:  Yes    Changes in Health Issues:   None reported    Chemical Use Review:   Substance Use: Chemical use reviewed, no active concerns identified      Tobacco Use: No current tobacco use.      Assessment: Current Emotional / Mental Status (status of significant symptoms):  Risk status (Self / Other harm or suicidal ideation)  Patient denies a history of suicidal ideation, suicide attempts, self-injurious behavior, homicidal ideation, homicidal behavior and and other safety concerns  Patient denies current fears or concerns for personal safety.  Patient denies current or recent suicidal ideation or behaviors.  Patient denies current or recent homicidal ideation or behaviors.  Patient denies current or recent self injurious behavior or ideation.  Patient denies other safety concerns.  A safety and risk management plan has not been developed at this time, however patient was encouraged to call [place] [phone] should there be a change in any of these risk factors.    Appearance:   Appropriate   Eye Contact:   Good   Psychomotor Behavior: Normal   Attitude:   Cooperative   Orientation:   All  Speech   Rate / Production: Normal/ Responsive Normal    Volume:  Normal   Mood:    Normal  Affect:    Appropriate   Thought Content:  Clear   Thought Form:  Coherent  Logical   Insight:    Good     Diagnoses:  1. Anxiety disorder, unspecified type    2. Recurrent major depressive disorder, in remission (H24)          Collateral Reports Completed:  Not Applicable    Plan: (Homework, other):  Patient was given information about behavioral services and  encouraged to schedule a follow up appointment with the clinic [facility] in 3 weeks.  She was also given information about mental health symptoms and treatment options .  CD Recommendations: No indications of CD issues.   KVNG Torres, [facility]                                              Individual Treatment Plan    Patient's Name: Mirna Carl  YOB: 2005    Date of Creation: 1/11/23   Date Treatment Plan Last Reviewed/Revised: 2/1/24    DSM5 Diagnoses: 296.30 (F33.9) Major Depressive Disorder, Recurrent Episode, Unspecified _ and With anxious distress or 300.00 (F41.9) Unspecified Anxiety Disorder  Psychosocial / Contextual Factors: school, relationship with father, finances  PROMIS (reviewed every 90 days):   PROMIS-10 Scores        7/10/2023     7:56 PM 9/18/2023     9:03 AM 12/18/2023     2:16 PM   PROMIS-10 Total Score w/o Sub Scores   PROMIS TOTAL - SUBSCORES 31 38 32        Referral / Collaboration:  Referral to another professional/service is not indicated at this time.    Anticipated number of session for this episode of care: 6-9 sessions  Anticipation frequency of session: Monthly  Anticipated Duration of each session: 38-52 minutes  Treatment plan will be reviewed in 90 days or when goals have been changed.       MeasurableTreatment Goal(s) related to diagnosis / functional impairment(s)  Goal 1: Patient will experience a reduction in anxious symptoms, along with a corresponding increase in relaxed emotional state.    I will know I've met my goal when anxious worries/fears decrease.      Objective #A (Patient Action)    Patient will use cognitive strategies identified in therapy to challenge anxious thoughts.  Status: Continued - Date(s):2/1/24     Intervention(s)  Therapist will utilize CBT and ACT to help pt challenge anxious thoughts and reduce intensity/duration of anxious distress.      Patient has reviewed and agreed to the above plan.        ESVIN Dailey,  KVNG, [facility]  February 1, 2024

## 2024-02-05 ENCOUNTER — PATIENT OUTREACH (OUTPATIENT)
Dept: CARE COORDINATION | Facility: CLINIC | Age: 19
End: 2024-02-05
Payer: COMMERCIAL

## 2024-02-22 ENCOUNTER — VIRTUAL VISIT (OUTPATIENT)
Dept: BEHAVIORAL HEALTH | Facility: CLINIC | Age: 19
End: 2024-02-22
Payer: COMMERCIAL

## 2024-02-22 DIAGNOSIS — F41.9 ANXIETY DISORDER, UNSPECIFIED TYPE: Primary | ICD-10-CM

## 2024-02-22 DIAGNOSIS — F33.40 RECURRENT MAJOR DEPRESSIVE DISORDER, IN REMISSION (H): ICD-10-CM

## 2024-02-22 PROCEDURE — 90832 PSYTX W PT 30 MINUTES: CPT | Mod: 95 | Performed by: SOCIAL WORKER

## 2024-02-22 NOTE — PROGRESS NOTES
[facility] Primary Care: Integrated Behavioral Health  February 22, 2024    Behavioral Health Clinician Progress Note    Patient Name: Mirna Carl           Service Type:  Individual      Service Location:   MyChart / Email (patient reached)      Session Start Time: 12:31p   Session End Time:  1:02p      Session Length: 16 - 37      Attendees: Client     Service Modality:  Video Visit:      Provider verified identity through the following two step process.  Patient provided:  Patient is known previously to provider    Telemedicine Visit: The patient's condition can be safely assessed and treated via synchronous audio and visual telemedicine encounter.      Reason for Telemedicine Visit: Patient has requested telehealth visit    Originating Site (Patient Location): Patient's home    Distant Site (Provider Location): [facility]    Consent:  The patient/guardian has verbally consented to: the potential risks and benefits of telemedicine (video visit) versus in person care; bill my insurance or make self-payment for services provided; and responsibility for payment of non-covered services.     Patient would like the video invitation sent by:  My Chart    Mode of Communication:  Video Conference via [facility]    Distant Location (Provider):  On-site    As the provider I attest to compliance with applicable laws and regulations related to telemedicine.    Visit Activities (Refresh list every visit): [facility] Only    Diagnostic Assessment Date: 12/19/23  Treatment Plan Review Date: 5/1/24  See Flowsheets for today's PHQ-9 and MATT-7 results  Previous PHQ-9:       12/5/2023    10:58 AM 1/18/2024    11:59 AM 2/21/2024    10:02 PM   PHQ-9 SCORE   PHQ-9 Total Score MyChart 0 0 0   PHQ-9 Total Score 0 0 0     Previous MATT-7:       12/14/2022    11:09 AM 7/12/2023     6:44 AM 11/1/2023    11:29 AM   MATT-7 SCORE   Total Score 0 (minimal anxiety) 0 (minimal anxiety) 0 (minimal  anxiety)   Total Score 0 0 0       DATA  Extended Session (60+ minutes): No  Interactive Complexity: No  Crisis: No  [facility] Patient: No    Treatment Objective(s) Addressed in This Session:  Target Behavior(s):  anxiety    Anxiety: will experience a reduction in anxiety    Current Stressors / Issues:  Pt shares that she is doing well.  Did drop a history class due to the professor.  Will take another history option next year.  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  Is a little frustrated as she hoped to get an office position and does plan to talk to her supervisor about it.    Back on Vyvanse as she had been off a few months due to difficulty getting it.  Does feel that it helps with motivation quite a bit.  Pharmacies are having a hard time getting the dose that pt is on so pt might talk with PCP about possibly changing the dosing.  Has had a few days where mood has been down though she shares it doesn't usually last long and can stay on top of school work, etc.         Progress on Treatment Objective(s) / Homework:  Stable - ACTION (Actively working towards change); Intervened by reinforcing change plan / affirming steps taken    Motivational Interviewing    MI Intervention: Expressed Empathy/Understanding, Permission to raise concern or advise, Open-ended questions and Reflections: simple and complex     Change Talk Expressed by the Patient: Desire to change Committment to change    Provider Response to Change Talk: E - Evoked more info from patient about behavior change, A - Affirmed patient's thoughts, decisions, or attempts at behavior change, R - Reflected patient's change talk and S - Summarized patient's change talk statements      Care Plan review completed: Yes    Medication Review:  Changes to psychiatric medications, see updated Medication List in EPIC.      Medication Compliance:  Yes    Changes in Health Issues:   None reported    Chemical Use Review:   Substance Use: Chemical  use reviewed, no active concerns identified      Tobacco Use: No current tobacco use.      Assessment: Current Emotional / Mental Status (status of significant symptoms):  Risk status (Self / Other harm or suicidal ideation)  Patient denies a history of suicidal ideation, suicide attempts, self-injurious behavior, homicidal ideation, homicidal behavior and and other safety concerns  Patient denies current fears or concerns for personal safety.  Patient denies current or recent suicidal ideation or behaviors.  Patient denies current or recent homicidal ideation or behaviors.  Patient denies current or recent self injurious behavior or ideation.  Patient denies other safety concerns.  A safety and risk management plan has not been developed at this time, however patient was encouraged to call [place] [phone] should there be a change in any of these risk factors.    Appearance:   Appropriate   Eye Contact:   Good   Psychomotor Behavior: Normal   Attitude:   Cooperative   Orientation:   All  Speech   Rate / Production: Normal/ Responsive Normal    Volume:  Normal   Mood:    Normal  Affect:    Appropriate   Thought Content:  Clear   Thought Form:  Coherent  Logical   Insight:    Good     Diagnoses:  1. Anxiety disorder, unspecified type    2. Recurrent major depressive disorder, in remission (H24)        Collateral Reports Completed:  Not Applicable    Plan: (Homework, other):  Patient was given information about behavioral services and encouraged to schedule a follow up appointment with the clinic [facility] in 3 weeks.  She was also given information about mental health symptoms and treatment options .  CD Recommendations: No indications of CD issues.   KVNG Torres, [facility]                                              Individual Treatment Plan    Patient's Name: Mirna Carl  YOB: 2005    Date of Creation: 1/11/23   Date Treatment Plan Last Reviewed/Revised: 2/1/24    DSM5 Diagnoses: 296.30  (F33.9) Major Depressive Disorder, Recurrent Episode, Unspecified _ and With anxious distress or 300.00 (F41.9) Unspecified Anxiety Disorder  Psychosocial / Contextual Factors: school, relationship with father, finances  PROMIS (reviewed every 90 days):   PROMIS-10 Scores        7/10/2023     7:56 PM 9/18/2023     9:03 AM 12/18/2023     2:16 PM   PROMIS-10 Total Score w/o Sub Scores   PROMIS TOTAL - SUBSCORES 31 38 32        Referral / Collaboration:  Referral to another professional/service is not indicated at this time.    Anticipated number of session for this episode of care:  12+  Anticipation frequency of session: Monthly  Anticipated Duration of each session: 38-52 minutes  Treatment plan will be reviewed in 90 days or when goals have been changed.       MeasurableTreatment Goal(s) related to diagnosis / functional impairment(s)  Goal 1: Patient will experience a reduction in anxious symptoms, along with a corresponding increase in relaxed emotional state.    I will know I've met my goal when anxious worries/fears decrease.      Objective #A (Patient Action)    Patient will use cognitive strategies identified in therapy to challenge anxious thoughts.  Status: Continued - Date(s):2/1/24     Intervention(s)  Therapist will utilize CBT and ACT to help pt challenge anxious thoughts and reduce intensity/duration of anxious distress.      Patient has reviewed and agreed to the above plan.        Ligia Garcia, ESVIN, LICSW, [facility]  February 22, 2024

## 2024-03-07 ENCOUNTER — VIRTUAL VISIT (OUTPATIENT)
Dept: BEHAVIORAL HEALTH | Facility: CLINIC | Age: 19
End: 2024-03-07
Payer: COMMERCIAL

## 2024-03-07 DIAGNOSIS — F41.9 ANXIETY DISORDER, UNSPECIFIED TYPE: Primary | ICD-10-CM

## 2024-03-07 DIAGNOSIS — F33.40 RECURRENT MAJOR DEPRESSIVE DISORDER, IN REMISSION (H): ICD-10-CM

## 2024-03-07 PROCEDURE — 90834 PSYTX W PT 45 MINUTES: CPT | Mod: 95 | Performed by: SOCIAL WORKER

## 2024-03-07 NOTE — PROGRESS NOTES
[facility] Primary Care: Integrated Behavioral Health  March 7, 2024      Behavioral Health Clinician Progress Note    Patient Name: Mirna Carl           Service Type:  Individual      [facility] ([facility] reached)      Session Start Time: 11:32a   Session End Time:  12:17p      Session Length: 38 - 52      Attendees: Client     Service Modality:  Video Visit:      Provider verified identity through the following two step process.  Patient provided:  Patient is known previously to provider    Telemedicine Visit: The patient's condition can be safely assessed and treated via synchronous audio and visual telemedicine encounter.      Reason for Telemedicine Visit: Patient has requested telehealth visit    Originating Site (Patient Location): Patient's home    Distant Site (Provider Location): Provider Remote Setting- Home Office    Consent:  The patient/guardian has verbally consented to: the potential risks and benefits of telemedicine (video visit) versus in person care; bill my insurance or make self-payment for services provided; and responsibility for payment of non-covered services.     Patient would like the video invitation sent by:  My Chart    Mode of Communication:  Video Conference via [facility]    Distant Location (Provider):  Off-site    As the provider I attest to compliance with applicable laws and regulations related to telemedicine.    Visit Activities (Refresh list every visit): [facility] Only    Diagnostic Assessment Date: 12/19/23  Treatment Plan Review Date: 5/1/24  See Flowsheets for today's PHQ-9 and MATT-7 results  Previous PHQ-9:       12/5/2023    10:58 AM 1/18/2024    11:59 AM 2/21/2024    10:02 PM   PHQ-9 SCORE   PHQ-9 Total Score MyChart 0 0 0   PHQ-9 Total Score 0 0 0     Previous MATT-7:       12/14/2022    11:09 AM 7/12/2023     6:44 AM 11/1/2023    11:29 AM   MATT-7 SCORE   Total Score 0 (minimal anxiety) 0 (minimal anxiety) 0 (minimal  "anxiety)   Total Score 0 0 0       DATA  Extended Session (60+ minutes): No  Interactive Complexity: No  Crisis: No  [facility] Patient: No    Treatment Objective(s) Addressed in This Session:  Target Behavior(s):  anxiety    Anxiety: will experience a reduction in anxiety    Current Stressors / Issues:  Shares that she is doing fine though is \"bored\".  3 days a week has classes that don't start until 1:00pm and finds it hard to get self motivated to go when has the whole morning off.  Did connect with a friend that has a class with and they are trying to hold each other accountable to go to class.  Has Spring Break coming up in 2 weeks.  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    Plans to work on vyvanse dosage with PCP as she isn't able to get her current mg dosage.    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  Did attend a workshop/training for [facility] active shooter scenarios.        Progress on Treatment Objective(s) / Homework:  Stable - ACTION (Actively working towards change); Intervened by reinforcing change plan / affirming steps taken    Motivational Interviewing    MI Intervention: Expressed Empathy/Understanding, Permission to raise concern or advise, Open-ended questions and Reflections: simple and complex     Change Talk Expressed by the Patient: Desire to change Committment to change    Provider Response to Change Talk: E - Evoked more info from patient about behavior change, A - Affirmed patient's thoughts, decisions, or attempts at behavior change, R - Reflected patient's change talk and S - Summarized patient's change talk statements      Care Plan review completed: Yes    Medication Review:  Changes to psychiatric medications, see updated Medication List in EPIC.      Medication Compliance:  Yes    Changes in Health Issues:   None reported    Chemical Use Review:   Substance Use: Chemical use reviewed, no active concerns identified      Tobacco Use: No current " tobacco use.      Assessment: Current Emotional / Mental Status (status of significant symptoms):  Risk status (Self / Other harm or suicidal ideation)  Patient denies a history of suicidal ideation, suicide attempts, self-injurious behavior, homicidal ideation, homicidal behavior and and other safety concerns  Patient denies current fears or concerns for personal safety.  Patient denies current or recent suicidal ideation or behaviors.  Patient denies current or recent homicidal ideation or behaviors.  Patient denies current or recent self injurious behavior or ideation.  Patient denies other safety concerns.  A safety and risk management plan has not been developed at this time, however patient was encouraged to call [place] [phone] should there be a change in any of these risk factors.    Appearance:   Appropriate   Eye Contact:   Good   Psychomotor Behavior: Normal   Attitude:   Cooperative   Orientation:   All  Speech   Rate / Production: Normal/ Responsive Normal    Volume:  Normal   Mood:    Normal  Affect:    Appropriate   Thought Content:  Clear   Thought Form:  Coherent  Logical   Insight:    Good     Diagnoses:  1. Anxiety disorder, unspecified type    2. Recurrent major depressive disorder, in remission (H24)          Collateral Reports Completed:  Not Applicable    Plan: (Homework, other):  Patient was given information about behavioral services and encouraged to schedule a follow up appointment with the clinic [facility] in 3 weeks.  She was also given information about mental health symptoms and treatment options .  CD Recommendations: No indications of CD issues.   KVNG Torres, [facility]                                              Individual Treatment Plan    Patient's Name: Mirna Carl  YOB: 2005    Date of Creation: 1/11/23   Date Treatment Plan Last Reviewed/Revised: 2/1/24    DSM5 Diagnoses: 296.30 (F33.9) Major Depressive Disorder, Recurrent Episode, Unspecified _  and With anxious distress or 300.00 (F41.9) Unspecified Anxiety Disorder  Psychosocial / Contextual Factors: school, relationship with father, finances  PROMIS (reviewed every 90 days):   PROMIS-10 Scores        7/10/2023     7:56 PM 9/18/2023     9:03 AM 12/18/2023     2:16 PM   PROMIS-10 Total Score w/o Sub Scores   PROMIS TOTAL - SUBSCORES 31 38 32        Referral / Collaboration:  Referral to another professional/service is not indicated at this time.    Anticipated number of session for this episode of care:  12+  Anticipation frequency of session: Monthly  Anticipated Duration of each session: 38-52 minutes  Treatment plan will be reviewed in 90 days or when goals have been changed.       MeasurableTreatment Goal(s) related to diagnosis / functional impairment(s)  Goal 1: Patient will experience a reduction in anxious symptoms, along with a corresponding increase in relaxed emotional state.    I will know I've met my goal when anxious worries/fears decrease.      Objective #A (Patient Action)    Patient will use cognitive strategies identified in therapy to challenge anxious thoughts.  Status: Continued - Date(s):2/1/24     Intervention(s)  Therapist will utilize CBT and ACT to help pt challenge anxious thoughts and reduce intensity/duration of anxious distress.      Patient has reviewed and agreed to the above plan.        Ligia Garcia, ESVIN, LICSW, [facility]  March 7, 2024

## 2024-03-13 ENCOUNTER — LAB (OUTPATIENT)
Dept: LAB | Facility: CLINIC | Age: 19
End: 2024-03-13
Payer: COMMERCIAL

## 2024-03-13 DIAGNOSIS — E06.3 HASHIMOTO'S THYROIDITIS: ICD-10-CM

## 2024-03-13 PROCEDURE — 36415 COLL VENOUS BLD VENIPUNCTURE: CPT

## 2024-03-13 PROCEDURE — 84443 ASSAY THYROID STIM HORMONE: CPT

## 2024-03-14 LAB — TSH SERPL DL<=0.005 MIU/L-ACNC: 2.42 UIU/ML (ref 0.3–4.2)

## 2024-03-15 ENCOUNTER — OFFICE VISIT (OUTPATIENT)
Dept: ENDOCRINOLOGY | Facility: CLINIC | Age: 19
End: 2024-03-15
Payer: COMMERCIAL

## 2024-03-15 VITALS
OXYGEN SATURATION: 96 % | DIASTOLIC BLOOD PRESSURE: 77 MMHG | BODY MASS INDEX: 42.32 KG/M2 | SYSTOLIC BLOOD PRESSURE: 112 MMHG | WEIGHT: 231.4 LBS | HEART RATE: 104 BPM

## 2024-03-15 DIAGNOSIS — E06.3 HASHIMOTO'S THYROIDITIS: Primary | ICD-10-CM

## 2024-03-15 DIAGNOSIS — E04.2 MULTIPLE THYROID NODULES: ICD-10-CM

## 2024-03-15 PROCEDURE — 99214 OFFICE O/P EST MOD 30 MIN: CPT | Performed by: INTERNAL MEDICINE

## 2024-03-15 NOTE — PROGRESS NOTES
ENDOCRINOLOGY FOLLOW-UP        HISTORY OF PRESENT ILLNESS    Mirna Phelpsprefers to be called Paolo) SALOMÓN Carl is seen in follow-up.    Paolo had thyroid ultrasound performed after our visit in 3/2023.    The study was completed in the [facility] system at [facility] on 3/27/2023.  It showed an overall heterogenous appearing thyroid.  There were no discrete nodules noted.    In the interim since last visit, Paolo was seen in cardiology for evaluation of severe hyperlipidemia suspected to be due to familial hyperlipidemia.  Crestor was prescribed.  She is anticipating follow-up with Dr. Alonso in 4/2024.    Has overall been doing had overall been doing well in the interim since last visit but over the past few weeks has had more low energy.  Had COVID-19 infection and influenza in recent months and has been slowly recovering.    Bowel movements are regular.    Menstrual cycles are occurring regularly every month on OCP (takes placebo pills every month).    She has not noted a change in the prominence of her thyroid or anterior neck discomfort.  No dysphagia or hoarseness.    Pertinent endocrine and related history:  1.  Hashimoto's thyroiditis.  Positive thyroid peroxidase antibody in 2020.  Normal thyroid function tests.  2.  History of thyroid nodules.  Discovered on physical exam while she was in eighth grade, for which she was referred to endocrinology. Previous thyroid ultrasounds revealed small nodules and heterogenous echotexture of the thyroid.  -9/28/2018, [facility]: Moderately heterogenous gland. 6 x 6 x 4 mm hypoechoic nodular area in the inferior right lobe, no vascularity or calcifications. 7 x 3 x 4 mm hypoechoic nodular area in the left mid thyroid, no vascularity or calcifications.  -10/16/2020, [facility]: Heterogenous echotexture of thyroid. 6 x 6 x 3 mm right mid thyroid nodule, hypoechoic, solid.  Previously seen left thyroid nodule was not present on this exam.  3.  Obesity.   History of consistent weight gain since early childhood, for which she has been referred to weight management clinic. She notes she had a 15lb weight gain since 01/2022 despite having an active job as a  and making dietary changes. Patient diagnosed with binge eating disorder and started on Vyvanse in 08/2022, following which she experienced a 4 lb weight loss. She was referred to [facility] and underwent an intake, and was recommended to join their intensive day program. This is not compatible with her school schedule, so she has not followed up further. Patient has been on combined OCP since age 12 for heavy menstrual cycles. Notes her menstrual cycles have been regular, except one missed period 2 months ago. She had a normal cycle the following month.     Pertinent Social History: Patient is a college student at [employer]. She works as a  at the [employer] and [employer]. Patient does not consume alcohol or smoke cigarettes.     PAST MEDICAL HISTORY  Past Medical History:   Diagnosis Date    Anxiety disorder 03/28/2022    Depressive disorder     Hashimoto's thyroiditis 03/28/2022    Moderate episode of recurrent major depressive disorder (H) 03/28/2022    Seasonal allergic rhinitis 03/28/2022    Uncomplicated asthma        MEDICATIONS  Current Outpatient Medications   Medication Sig Dispense Refill    cetirizine (ZYRTEC) 10 MG tablet Take 1 tablet (10 mg) by mouth daily 90 tablet 3    FLUoxetine (PROZAC) 40 MG capsule Take 1 capsule (40 mg) by mouth daily 90 capsule 3    levonorgestrel-ethinyl estradiol (AVIANE) 0.1-20 MG-MCG tablet , TAKE 1 TABLET BY MOUTH DAILY 84 tablet 1    lisdexamfetamine (VYVANSE) 30 MG capsule Take 1 capsule (30 mg) by mouth every morning 30 capsule 0    rosuvastatin (CRESTOR) 40 MG tablet Take 1 tablet (40 mg) by mouth daily 90 tablet 3       Allergies, family, and social history were reviewed and documented as needed in EHR.     REVIEW OF SYSTEMS  A focused  ROS was performed, with pertinent positives and negatives as noted in the HPI.      PHYSICAL EXAM  /77 (BP Location: Right arm, Patient Position: Sitting, Cuff Size: Adult Large)   Pulse 104   Wt 105 kg (231 lb 6.4 oz)   SpO2 96%   BMI 42.32 kg/m    Body mass index is 42.32 kg/m .  Constitutional: Vital signs reviewed, as recorded above. Patient is alert, oriented and appears in no acute distress.  Eyes: PER, EOMI, no stare, lid lag, or retraction; no conjunctival injection.  Neck: Neck supple, thyroid is palpable, no nodules on exam, thyroid at upper limit of normal size and left lobe more prominent, some tenderness of anterior neck on exam.  Lymphatic: No cervical or supraclavicular LAD.  Cardiovascular: RRR, normal S1/S2, no audible murmurs, rubs or gallops; No LE edema.  Respiratory: CTAB, without wheezes, crackles or rhonchi; normal chest wall motion and respiratory effort.  MSK: No clubbing or cyanosis; normal muscle bulk and tone.  Skin: Normal skin color, temperature, turgor and texture.  Neurological: Alert and oriented times 3. Fine tremor.    DATA REVIEW  Each of the following laboratory and/or imaging studies were reviewed.          Impression of thyroid ultrasound as noted in HPI.    EXAM: US THYROID   LOCATION: [facility]   DATE/TIME: 3/27/2023 1:19 PM     INDICATION: Multiple thyroid nodules (hrc), hashimoto's thyroiditis (hrc)   COMPARISON: 10/16/2020.   TECHNIQUE: Routine. Nodules are characterized per   ACR Thyroid Imaging, Reporting and Data System (TI-RADS): White Paper of the ACR TI-RADS Committee   Feliciano Lane et al. Journal of the American College of Radiology 2017. Volume 14 (2017), Issue 5, 587-160.     FINDINGS:   RIGHT lobe: 3.9 x 1.5 x 1.2 cm. Heterogeneous echotexture. No discrete nodule.   Isthmus: 6 mm.   LEFT lobe: 4.8 x 1.6 x 1.2 cm. Heterogeneous echotexture. The technologist measures a 1.4 cm area in the mid left thyroid lobe which is favored to  represent heterogeneous echogenicity and is unchanged from previous exam. No discrete nodule.     NECK: No cervical lymphadenopathy.     IMPRESSION:   1. Heterogeneous thyroid gland similar to previous exam without discrete suspicious nodule.       ASSESSMENT  1.  Hashimoto's thyroiditis.  Based on positive TPO antibody.  Clinically appears euthyroid, although more fatigue in recent weeks.  Had TSH at the very upper limit of normal in 12/2023, normal on check prior to this visit.  Paolo wonders if there may be benefit to thyroid hormone supplementation.  If TSH remains in mid normal range, I would not start thyroid hormone supplementation.  However, if TSH trends toward the upper end of normal I would consider starting low-dose of levothyroxine especially given patient's age and coexisting dyslipidemia.  To this end, we will recheck thyroid function tests when she next has lab draw for cardiology.    2.  History of thyroid nodules.  Normal calcitonin in 9/2022.  I reviewed most recent thyroid ultrasound from 3/2023 with the patient: Thyroid gland appears heterogenous and there are subcentimeter nodular areas, suspect pseudo nodules; there is a more prominent left mid lobe nodularity, although this also looks pseudonodular.  In the absence of discrete nodules, would recommend no immediate intervention.  We will anticipate follow-up thyroid ultrasound 2 years after last study to follow-up on left-sided nodularity.  Have asked patient to update me sooner if she notes progressing discomfort in the anterior neck or symptoms suggestive of compression, which we reviewed.    3.  Binge eating disorder and obesity.  Was following in weight management clinic, has not had recent follow-up.    PLAN  -Lab draw on 4/22/2024; if thyroid function tests are borderline, we can consider starting thyroid hormone supplement  -Follow-up in 1 year, with thyroid ultrasound and labs before visit (we will fax orders for ultrasound to [facility])  -Contact me sooner if noticing persisting neck tenderness, prominence of the thyroid, changes in swallowing, voice or breathing   -We will communicate results via MyChart, or if needed by phone      I personally spent a total of 34 minutes on the date of encounter reviewing medical records, evaluating the patient, coordinating care and documenting in the EHR, as detailed above.    Kay Crawley MD   Division of Diabetes, Endocrinology and Metabolism  Department of Medicine      cc: Kaylynn Ferraro MD

## 2024-03-15 NOTE — LETTER
3/15/2024         RE: Mirna Carl  [address] [id number]        Dear Colleague,    Thank you for referring your patient, Mirna Carl, to the [facility]. Please see a copy of my visit note below.      ENDOCRINOLOGY FOLLOW-UP        HISTORY OF PRESENT ILLNESS    Mirna Phelpsprefers to be called Peace Carl is seen in follow-up.    Paolo had thyroid ultrasound performed after our visit in 3/2023.    The study was completed in the [facility] system at [facility] on 3/27/2023.  It showed an overall heterogenous appearing thyroid.  There were no discrete nodules noted.    In the interim since last visit, Paolo was seen in cardiology for evaluation of severe hyperlipidemia suspected to be due to familial hyperlipidemia.  Crestor was prescribed.  She is anticipating follow-up with Dr. Alonso in 4/2024.    Has overall been doing had overall been doing well in the interim since last visit but over the past few weeks has had more low energy.  Had COVID-19 infection and influenza in recent months and has been slowly recovering.    Bowel movements are regular.    Menstrual cycles are occurring regularly every month on OCP (takes placebo pills every month).    She has not noted a change in the prominence of her thyroid or anterior neck discomfort.  No dysphagia or hoarseness.    Pertinent endocrine and related history:  1.  Hashimoto's thyroiditis.  Positive thyroid peroxidase antibody in 2020.  Normal thyroid function tests.  2.  History of thyroid nodules.  Discovered on physical exam while she was in eighth grade, for which she was referred to endocrinology. Previous thyroid ultrasounds revealed small nodules and heterogenous echotexture of the thyroid.  -9/28/2018, [facility]: Moderately heterogenous gland. 6 x 6 x 4 mm hypoechoic nodular area in the inferior right lobe, no vascularity or calcifications. 7 x 3 x 4 mm hypoechoic nodular area  in the left mid thyroid, no vascularity or calcifications.  -10/16/2020, [facility]: Heterogenous echotexture of thyroid. 6 x 6 x 3 mm right mid thyroid nodule, hypoechoic, solid.  Previously seen left thyroid nodule was not present on this exam.  3.  Obesity.  History of consistent weight gain since early childhood, for which she has been referred to weight management clinic. She notes she had a 15lb weight gain since 01/2022 despite having an active job as a  and making dietary changes. Patient diagnosed with binge eating disorder and started on Vyvanse in 08/2022, following which she experienced a 4 lb weight loss. She was referred to [facility] and underwent an intake, and was recommended to join their intensive day program. This is not compatible with her school schedule, so she has not followed up further. Patient has been on combined OCP since age 12 for heavy menstrual cycles. Notes her menstrual cycles have been regular, except one missed period 2 months ago. She had a normal cycle the following month.     Pertinent Social History: Patient is a college student at [employer]. She works as a  at the [employer] and [employer]. Patient does not consume alcohol or smoke cigarettes.     PAST MEDICAL HISTORY  Past Medical History:   Diagnosis Date     Anxiety disorder 03/28/2022     Depressive disorder      Hashimoto's thyroiditis 03/28/2022     Moderate episode of recurrent major depressive disorder (H) 03/28/2022     Seasonal allergic rhinitis 03/28/2022     Uncomplicated asthma        MEDICATIONS  Current Outpatient Medications   Medication Sig Dispense Refill     cetirizine (ZYRTEC) 10 MG tablet Take 1 tablet (10 mg) by mouth daily 90 tablet 3     FLUoxetine (PROZAC) 40 MG capsule Take 1 capsule (40 mg) by mouth daily 90 capsule 3     levonorgestrel-ethinyl estradiol (AVIANE) 0.1-20 MG-MCG tablet , TAKE 1 TABLET BY MOUTH DAILY 84 tablet 1     lisdexamfetamine (VYVANSE) 30 MG  capsule Take 1 capsule (30 mg) by mouth every morning 30 capsule 0     rosuvastatin (CRESTOR) 40 MG tablet Take 1 tablet (40 mg) by mouth daily 90 tablet 3       Allergies, family, and social history were reviewed and documented as needed in EHR.     REVIEW OF SYSTEMS  A focused ROS was performed, with pertinent positives and negatives as noted in the HPI.      PHYSICAL EXAM  /77 (BP Location: Right arm, Patient Position: Sitting, Cuff Size: Adult Large)   Pulse 104   Wt 105 kg (231 lb 6.4 oz)   SpO2 96%   BMI 42.32 kg/m    Body mass index is 42.32 kg/m .  Constitutional: Vital signs reviewed, as recorded above. Patient is alert, oriented and appears in no acute distress.  Eyes: PER, EOMI, no stare, lid lag, or retraction; no conjunctival injection.  Neck: Neck supple, thyroid is palpable, no nodules on exam, thyroid at upper limit of normal size and left lobe more prominent, some tenderness of anterior neck on exam.  Lymphatic: No cervical or supraclavicular LAD.  Cardiovascular: RRR, normal S1/S2, no audible murmurs, rubs or gallops; No LE edema.  Respiratory: CTAB, without wheezes, crackles or rhonchi; normal chest wall motion and respiratory effort.  MSK: No clubbing or cyanosis; normal muscle bulk and tone.  Skin: Normal skin color, temperature, turgor and texture.  Neurological: Alert and oriented times 3. Fine tremor.    DATA REVIEW  Each of the following laboratory and/or imaging studies were reviewed.          Impression of thyroid ultrasound as noted in HPI.    EXAM: US THYROID   LOCATION: [facility]   DATE/TIME: 3/27/2023 1:19 PM     INDICATION: Multiple thyroid nodules (hrc), hashimoto's thyroiditis (hrc)   COMPARISON: 10/16/2020.   TECHNIQUE: Routine. Nodules are characterized per   ACR Thyroid Imaging, Reporting and Data System (TI-RADS): White Paper of the ACR TI-RADS Committee   Feliciano Lane et al. Journal of the American College of Radiology 2017. Volume 14 (2017),  Issue 5, 310-174.     FINDINGS:   RIGHT lobe: 3.9 x 1.5 x 1.2 cm. Heterogeneous echotexture. No discrete nodule.   Isthmus: 6 mm.   LEFT lobe: 4.8 x 1.6 x 1.2 cm. Heterogeneous echotexture. The technologist measures a 1.4 cm area in the mid left thyroid lobe which is favored to represent heterogeneous echogenicity and is unchanged from previous exam. No discrete nodule.     NECK: No cervical lymphadenopathy.     IMPRESSION:   1. Heterogeneous thyroid gland similar to previous exam without discrete suspicious nodule.       ASSESSMENT  1.  Hashimoto's thyroiditis.  Based on positive TPO antibody.  Clinically appears euthyroid, although more fatigue in recent weeks.  Had TSH at the very upper limit of normal in 12/2023, normal on check prior to this visit.  Paolo wonders if there may be benefit to thyroid hormone supplementation.  If TSH remains in mid normal range, I would not start thyroid hormone supplementation.  However, if TSH trends toward the upper end of normal I would consider starting low-dose of levothyroxine especially given patient's age and coexisting dyslipidemia.  To this end, we will recheck thyroid function tests when she next has lab draw for cardiology.    2.  History of thyroid nodules.  Normal calcitonin in 9/2022.  I reviewed most recent thyroid ultrasound from 3/2023 with the patient: Thyroid gland appears heterogenous and there are subcentimeter nodular areas, suspect pseudo nodules; there is a more prominent left mid lobe nodularity, although this also looks pseudonodular.  In the absence of discrete nodules, would recommend no immediate intervention.  We will anticipate follow-up thyroid ultrasound 2 years after last study to follow-up on left-sided nodularity.  Have asked patient to update me sooner if she notes progressing discomfort in the anterior neck or symptoms suggestive of compression, which we reviewed.    3.  Binge eating disorder and obesity.  Was following in weight management  clinic, has not had recent follow-up.    PLAN  -Lab draw on 4/22/2024; if thyroid function tests are borderline, we can consider starting thyroid hormone supplement  -Follow-up in 1 year, with thyroid ultrasound and labs before visit (we will fax orders for ultrasound to [facility])  -Contact me sooner if noticing persisting neck tenderness, prominence of the thyroid, changes in swallowing, voice or breathing   -We will communicate results via MyChart, or if needed by phone      I personally spent a total of 34 minutes on the date of encounter reviewing medical records, evaluating the patient, coordinating care and documenting in the EHR, as detailed above.    Kay Crawley MD   Division of Diabetes, Endocrinology and Metabolism  Department of Medicine      cc: Kaylynn Ferraro MD             Again, thank you for allowing me to participate in the care of your patient.        Sincerely,        Kay Crawley MD

## 2024-04-22 ENCOUNTER — LAB (OUTPATIENT)
Dept: LAB | Facility: CLINIC | Age: 19
End: 2024-04-22
Payer: COMMERCIAL

## 2024-04-22 DIAGNOSIS — E78.01 FAMILIAL HYPERCHOLESTEREMIA: ICD-10-CM

## 2024-04-22 DIAGNOSIS — E06.3 HASHIMOTO'S THYROIDITIS: ICD-10-CM

## 2024-04-22 DIAGNOSIS — E06.3 HASHIMOTO'S THYROIDITIS: Primary | ICD-10-CM

## 2024-04-22 DIAGNOSIS — E78.5 DYSLIPIDEMIA WITH ELEVATED LOW DENSITY LIPOPROTEIN (LDL) CHOLESTEROL AND ABNORMALLY LOW HIGH DENSITY LIPOPROTEIN (HDL) CHOLESTEROL: ICD-10-CM

## 2024-04-22 LAB
CHOLEST SERPL-MCNC: 143 MG/DL
FASTING STATUS PATIENT QL REPORTED: ABNORMAL
HDLC SERPL-MCNC: 49 MG/DL
LDLC SERPL CALC-MCNC: 82 MG/DL
NONHDLC SERPL-MCNC: 94 MG/DL
TRIGL SERPL-MCNC: 59 MG/DL
TSH SERPL DL<=0.005 MIU/L-ACNC: 2.55 UIU/ML (ref 0.3–4.2)

## 2024-04-22 PROCEDURE — 80061 LIPID PANEL: CPT

## 2024-04-22 PROCEDURE — 36415 COLL VENOUS BLD VENIPUNCTURE: CPT

## 2024-04-22 PROCEDURE — 84443 ASSAY THYROID STIM HORMONE: CPT

## 2024-04-30 ENCOUNTER — OFFICE VISIT (OUTPATIENT)
Dept: CARDIOLOGY | Facility: CLINIC | Age: 19
End: 2024-04-30
Attending: INTERNAL MEDICINE
Payer: COMMERCIAL

## 2024-04-30 VITALS
RESPIRATION RATE: 20 BRPM | SYSTOLIC BLOOD PRESSURE: 110 MMHG | BODY MASS INDEX: 43.37 KG/M2 | DIASTOLIC BLOOD PRESSURE: 74 MMHG | WEIGHT: 237.1 LBS | OXYGEN SATURATION: 97 % | HEART RATE: 94 BPM

## 2024-04-30 DIAGNOSIS — E66.813 CLASS 3 SEVERE OBESITY DUE TO EXCESS CALORIES WITH SERIOUS COMORBIDITY AND BODY MASS INDEX (BMI) OF 40.0 TO 44.9 IN ADULT (H): ICD-10-CM

## 2024-04-30 DIAGNOSIS — E66.01 CLASS 3 SEVERE OBESITY DUE TO EXCESS CALORIES WITH SERIOUS COMORBIDITY AND BODY MASS INDEX (BMI) OF 40.0 TO 44.9 IN ADULT (H): ICD-10-CM

## 2024-04-30 DIAGNOSIS — E78.01 FAMILIAL HYPERCHOLESTEREMIA: Primary | ICD-10-CM

## 2024-04-30 PROCEDURE — G2211 COMPLEX E/M VISIT ADD ON: HCPCS | Performed by: INTERNAL MEDICINE

## 2024-04-30 PROCEDURE — 99214 OFFICE O/P EST MOD 30 MIN: CPT | Performed by: INTERNAL MEDICINE

## 2024-04-30 NOTE — LETTER
4/30/2024    Kaylynn Ferraro MD  [address] [facility] [address]    RE: Mirna Carl       Dear Colleague,     I had the pleasure of seeing Mir[facility] the [facility].    HEART CARE ENCOUNTER CONSULTATON NOTE      [facility]  [phone]      Assessment/Recommendations   Assessment:   Severe hyperlipidemia, possible heterozygotes familiar hyperlipidemia.  TC: 300, LDL:233, HDL: 46.  No corneal arcus. No xanthomata.   Recent Labs   Lab Test 04/22/24  1040 12/05/23  0938 06/20/22  0913 06/14/18  0822   CHOL 143 300*   < > 223*   HDL 49* 46*   < > 51   LDL 82 233*   < > 156*   TRIG 59 104   < > 61   CHOLHDLRATIO  --   --   --  4.4    < > = values in this interval not displayed.     2.  Family history CVA, Mother <56 yo.   3.   Obesity: 43.    Plan:    Continue Crestor 40 mg daily, long-term LDL goal less than 70.  Patient like to work on weight loss management prior to starting Zetia.  3.  Pregnancy and statins discussed again would hold statins if she decides to proceed with childbirth in the future  4.  Discussed dietary modifications  5.  Discussed importance of exercise  6.  Discussed indications for PCSK9 inhibitors and inclisiran in the future  7.  Discussed importance of her siblings to be screened.  She has a teenage half sister  8.  Discussed pharmacologic weight loss medications.  Patient has been prescribed weight loss medication but has not been able to obtain drug due to drug shortages.     Follow-up in 1 year         History of Present Illness/Subjective    HPI: Mirna Carl is a 18 year old female longstanding history of elevation in her lipids dating back to age of 13 who presents to cardiology clinic in follow-up for hyperlipidemia.    Last clinical evaluation the patient has done well with starting rosuvastatin at 40 mg daily.  She denies any side effects of medication including myalgias or GI symptoms.  Recent lab work  demonstrated significant improvement in cholesterol levels.  She is tolerating medication well and plans to stay on medication long-term.  She is working on weight loss lowering as well but had difficulty with obtaining medications for weight loss.  She has active job which requires her to be outside throughout the day.      Discussed alternatives to statins.  Discussed importance of holding statins during family-planning and childbirth.       Physical Examination  Review of Systems   Vitals: /74 (BP Location: Right arm, Patient Position: Sitting, Cuff Size: Adult Large)   Pulse 94   Resp 20   Wt 107.5 kg (237 lb 1.6 oz)   SpO2 97%   BMI 43.37 kg/m    BMI= Body mass index is 43.37 kg/m .  Wt Readings from Last 3 Encounters:   04/30/24 107.5 kg (237 lb 1.6 oz)   03/15/24 105 kg (231 lb 6.4 oz)   01/30/24 104.7 kg (230 lb 14.4 oz)        Pleasant, obese   ENT/Mouth: membranes moist, no oral lesions or bleeding gums.      EYES:  no scleral icterus, normal conjunctivae.  Cornea normal.  No evidence of hyperlipidemia       Chest/Lungs:   lungs are clear to auscultation, no rales or wheezing, no sternal scar, equal chest wall expansion    Cardiovascular:   Regular. Normal first and second heart sounds with no murmurs, rubs, or gallops; the carotid, radial and posterior tibial pulses are intact, Jugular venous pressure normal, no edema bilaterally        Extremities: no cyanosis or clubbing   Skin: no xanthelasma on Achilles or eyelids, warm.    Neurologic: normal  bilateral, no tremors     Psychiatric: alert and oriented x3, calm        Please refer above for cardiac ROS details.        Medical History  Surgical History Family History Social History   Past Medical History:   Diagnosis Date    Anxiety disorder 03/28/2022    Depressive disorder     Hashimoto's thyroiditis 03/28/2022    Moderate episode of recurrent major depressive disorder (H) 03/28/2022    Seasonal allergic rhinitis 03/28/2022     Uncomplicated asthma      No past surgical history on file.  Family History   Problem Relation Age of Onset    Hyperlipidemia Mother     Cerebrovascular Disease Mother     Anxiety Disorder Mother     Alcoholism Maternal Grandmother     Hyperlipidemia Maternal Grandmother         Yeni        Social History     Socioeconomic History    Marital status: Single     Spouse name: Not on file    Number of children: Not on file    Years of education: Not on file    Highest education level: Not on file   Occupational History    Not on file   Tobacco Use    Smoking status: Never     Passive exposure: Never    Smokeless tobacco: Never   Vaping Use    Vaping status: Never Used   Substance and Sexual Activity    Alcohol use: Never     Comment: very rare    Drug use: Not Currently     Comment: gummies occasionally    Sexual activity: Not Currently     Partners: Female, Male     Birth control/protection: Condom, Pill   Other Topics Concern    Parent/sibling w/ CABG, MI or angioplasty before 65F 55M? No   Social History Narrative    Not on file     Social Determinants of Health     Financial Resource Strain: Low Risk  (10/26/2023)    Financial Resource Strain     Within the past 12 months, have you or your family members you live with been unable to get utilities (heat, electricity) when it was really needed?: No   Food Insecurity: Low Risk  (10/26/2023)    Food Insecurity     Within the past 12 months, did you worry that your food would run out before you got money to buy more?: No     Within the past 12 months, did the food you bought just not last and you didn t have money to get more?: No   Transportation Needs: Low Risk  (10/26/2023)    Transportation Needs     Within the past 12 months, has lack of transportation kept you from medical appointments, getting your medicines, non-medical meetings or appointments, work, or from getting things that you need?: No   Physical Activity: Not on file   Stress: Not on file   Social  "Connections: Not on file   Interpersonal Safety: Not on file   Housing Stability: Low Risk  (10/26/2023)    Housing Stability     Do you have housing? : Yes     Are you worried about losing your housing?: No           Medications  Allergies   Current Outpatient Medications   Medication Sig Dispense Refill    cetirizine (ZYRTEC) 10 MG tablet Take 1 tablet (10 mg) by mouth daily 90 tablet 3    FLUoxetine (PROZAC) 40 MG capsule Take 1 capsule (40 mg) by mouth daily 90 capsule 3    levonorgestrel-ethinyl estradiol (AVIANE) 0.1-20 MG-MCG tablet , TAKE 1 TABLET BY MOUTH DAILY 84 tablet 1    rosuvastatin (CRESTOR) 40 MG tablet Take 1 tablet (40 mg) by mouth daily 90 tablet 3       Allergies   Allergen Reactions    Horse Protein Cough, Difficulty breathing, Fatigue, Headache, Hives, Itching and Shortness Of Breath          Lab Results    Chemistry/lipid CBC Cardiac Enzymes/BNP/TSH/INR   Recent Labs   Lab Test 12/05/23 0938 06/20/22  0913 06/14/18  0822   CHOL 300*   < > 223*   HDL 46*   < > 51   *   < > 156*   TRIG 104   < > 61   CHOLHDLRATIO  --   --  4.4    < > = values in this interval not displayed.     Recent Labs   Lab Test 12/05/23 0938 02/21/23  1436 06/20/22  0913   * 269* 226*     Recent Labs   Lab Test 12/05/23 0938      POTASSIUM 4.1   CHLORIDE 106   CO2 22   GLC 92   BUN 8.7   CR 0.58   GFRESTIMATED >90   MANUELA 8.8     Recent Labs   Lab Test 12/05/23 0938 06/14/18  0822   CR 0.58 0.60     Recent Labs   Lab Test 02/21/23  1436 06/20/22  0913   A1C 5.2 5.4          Recent Labs   Lab Test 07/03/20 0932   WBC 5.8   HGB 12.9   HCT 38.7   MCV 86.8        Recent Labs   Lab Test 07/03/20 0932   HGB 12.9    No results for input(s): \"TROPONINI\" in the last 47532 hours.  No results for input(s): \"BNP\", \"NTBNPI\", \"NTBNP\" in the last 26005 hours.  Recent Labs   Lab Test 12/05/23 0938   TSH 4.14     No results for input(s): \"INR\" in the last 67986 hours.     Scott Alonso, DO      Thank " you for allowing me to participate in the care of your patient.      Sincerely,     Scott Alonso DO     [facility]  cc:   Scott Alonso DO  [address] [place] [address]

## 2024-04-30 NOTE — PROGRESS NOTES
HEART CARE ENCOUNTER CONSULTATON NOTE      [facility]  [phone]      Assessment/Recommendations   Assessment:   Severe hyperlipidemia, possible heterozygotes familiar hyperlipidemia.  TC: 300, LDL:233, HDL: 46.  No corneal arcus. No xanthomata.   Recent Labs   Lab Test 04/22/24  1040 12/05/23  0938 06/20/22  0913 06/14/18  0822   CHOL 143 300*   < > 223*   HDL 49* 46*   < > 51   LDL 82 233*   < > 156*   TRIG 59 104   < > 61   CHOLHDLRATIO  --   --   --  4.4    < > = values in this interval not displayed.     2.  Family history CVA, Mother <54 yo.   3.   Obesity: 43.    Plan:    Continue Crestor 40 mg daily, long-term LDL goal less than 70.  Patient like to work on weight loss management prior to starting Zetia.  3.  Pregnancy and statins discussed again would hold statins if she decides to proceed with childbirth in the future  4.  Discussed dietary modifications  5.  Discussed importance of exercise  6.  Discussed indications for PCSK9 inhibitors and inclisiran in the future  7.  Discussed importance of her siblings to be screened.  She has a teenage half sister  8.  Discussed pharmacologic weight loss medications.  Patient has been prescribed weight loss medication but has not been able to obtain drug due to drug shortages.     Follow-up in 1 year         History of Present Illness/Subjective    HPI: Mirna Carl is a 18 year old female longstanding history of elevation in her lipids dating back to age of 13 who presents to cardiology clinic in follow-up for hyperlipidemia.    Last clinical evaluation the patient has done well with starting rosuvastatin at 40 mg daily.  She denies any side effects of medication including myalgias or GI symptoms.  Recent lab work demonstrated significant improvement in cholesterol levels.  She is tolerating medication well and plans to stay on medication long-term.  She is working on weight loss lowering as well but had difficulty with obtaining  medications for weight loss.  She has active job which requires her to be outside throughout the day.      Discussed alternatives to statins.  Discussed importance of holding statins during family-planning and childbirth.       Physical Examination  Review of Systems   Vitals: /74 (BP Location: Right arm, Patient Position: Sitting, Cuff Size: Adult Large)   Pulse 94   Resp 20   Wt 107.5 kg (237 lb 1.6 oz)   SpO2 97%   BMI 43.37 kg/m    BMI= Body mass index is 43.37 kg/m .  Wt Readings from Last 3 Encounters:   04/30/24 107.5 kg (237 lb 1.6 oz)   03/15/24 105 kg (231 lb 6.4 oz)   01/30/24 104.7 kg (230 lb 14.4 oz)        Pleasant, obese   ENT/Mouth: membranes moist, no oral lesions or bleeding gums.      EYES:  no scleral icterus, normal conjunctivae.  Cornea normal.  No evidence of hyperlipidemia       Chest/Lungs:   lungs are clear to auscultation, no rales or wheezing, no sternal scar, equal chest wall expansion    Cardiovascular:   Regular. Normal first and second heart sounds with no murmurs, rubs, or gallops; the carotid, radial and posterior tibial pulses are intact, Jugular venous pressure normal, no edema bilaterally        Extremities: no cyanosis or clubbing   Skin: no xanthelasma on Achilles or eyelids, warm.    Neurologic: normal  bilateral, no tremors     Psychiatric: alert and oriented x3, calm        Please refer above for cardiac ROS details.        Medical History  Surgical History Family History Social History   Past Medical History:   Diagnosis Date    Anxiety disorder 03/28/2022    Depressive disorder     Hashimoto's thyroiditis 03/28/2022    Moderate episode of recurrent major depressive disorder (H) 03/28/2022    Seasonal allergic rhinitis 03/28/2022    Uncomplicated asthma      No past surgical history on file.  Family History   Problem Relation Age of Onset    Hyperlipidemia Mother     Cerebrovascular Disease Mother     Anxiety Disorder Mother     Alcoholism Maternal  Grandmother     Hyperlipidemia Maternal Grandmother         Yeni        Social History     Socioeconomic History    Marital status: Single     Spouse name: Not on file    Number of children: Not on file    Years of education: Not on file    Highest education level: Not on file   Occupational History    Not on file   Tobacco Use    Smoking status: Never     Passive exposure: Never    Smokeless tobacco: Never   Vaping Use    Vaping status: Never Used   Substance and Sexual Activity    Alcohol use: Never     Comment: very rare    Drug use: Not Currently     Comment: gummies occasionally    Sexual activity: Not Currently     Partners: Female, Male     Birth control/protection: Condom, Pill   Other Topics Concern    Parent/sibling w/ CABG, MI or angioplasty before 65F 55M? No   Social History Narrative    Not on file     Social Determinants of Health     Financial Resource Strain: Low Risk  (10/26/2023)    Financial Resource Strain     Within the past 12 months, have you or your family members you live with been unable to get utilities (heat, electricity) when it was really needed?: No   Food Insecurity: Low Risk  (10/26/2023)    Food Insecurity     Within the past 12 months, did you worry that your food would run out before you got money to buy more?: No     Within the past 12 months, did the food you bought just not last and you didn t have money to get more?: No   Transportation Needs: Low Risk  (10/26/2023)    Transportation Needs     Within the past 12 months, has lack of transportation kept you from medical appointments, getting your medicines, non-medical meetings or appointments, work, or from getting things that you need?: No   Physical Activity: Not on file   Stress: Not on file   Social Connections: Not on file   Interpersonal Safety: Not on file   Housing Stability: Low Risk  (10/26/2023)    Housing Stability     Do you have housing? : Yes     Are you worried about losing your housing?: No      "      Medications  Allergies   Current Outpatient Medications   Medication Sig Dispense Refill    cetirizine (ZYRTEC) 10 MG tablet Take 1 tablet (10 mg) by mouth daily 90 tablet 3    FLUoxetine (PROZAC) 40 MG capsule Take 1 capsule (40 mg) by mouth daily 90 capsule 3    levonorgestrel-ethinyl estradiol (AVIANE) 0.1-20 MG-MCG tablet , TAKE 1 TABLET BY MOUTH DAILY 84 tablet 1    rosuvastatin (CRESTOR) 40 MG tablet Take 1 tablet (40 mg) by mouth daily 90 tablet 3       Allergies   Allergen Reactions    Horse Protein Cough, Difficulty breathing, Fatigue, Headache, Hives, Itching and Shortness Of Breath          Lab Results    Chemistry/lipid CBC Cardiac Enzymes/BNP/TSH/INR   Recent Labs   Lab Test 12/05/23  0938 06/20/22  0913 06/14/18  0822   CHOL 300*   < > 223*   HDL 46*   < > 51   *   < > 156*   TRIG 104   < > 61   CHOLHDLRATIO  --   --  4.4    < > = values in this interval not displayed.     Recent Labs   Lab Test 12/05/23 0938 02/21/23  1436 06/20/22  0913   * 269* 226*     Recent Labs   Lab Test 12/05/23 0938      POTASSIUM 4.1   CHLORIDE 106   CO2 22   GLC 92   BUN 8.7   CR 0.58   GFRESTIMATED >90   MANUELA 8.8     Recent Labs   Lab Test 12/05/23  0938 06/14/18  0822   CR 0.58 0.60     Recent Labs   Lab Test 02/21/23  1436 06/20/22  0913   A1C 5.2 5.4          Recent Labs   Lab Test 07/03/20  0932   WBC 5.8   HGB 12.9   HCT 38.7   MCV 86.8        Recent Labs   Lab Test 07/03/20  0932   HGB 12.9    No results for input(s): \"TROPONINI\" in the last 71831 hours.  No results for input(s): \"BNP\", \"NTBNPI\", \"NTBNP\" in the last 02542 hours.  Recent Labs   Lab Test 12/05/23  0938   TSH 4.14     No results for input(s): \"INR\" in the last 46670 hours.     Scott Alonso, DO                            "

## 2024-05-19 ENCOUNTER — HEALTH MAINTENANCE LETTER (OUTPATIENT)
Age: 19
End: 2024-05-19

## 2024-06-21 SDOH — HEALTH STABILITY: PHYSICAL HEALTH: ON AVERAGE, HOW MANY MINUTES DO YOU ENGAGE IN EXERCISE AT THIS LEVEL?: 110 MIN

## 2024-06-21 SDOH — HEALTH STABILITY: PHYSICAL HEALTH: ON AVERAGE, HOW MANY DAYS PER WEEK DO YOU ENGAGE IN MODERATE TO STRENUOUS EXERCISE (LIKE A BRISK WALK)?: 5 DAYS

## 2024-06-21 ASSESSMENT — SOCIAL DETERMINANTS OF HEALTH (SDOH): HOW OFTEN DO YOU GET TOGETHER WITH FRIENDS OR RELATIVES?: NEVER

## 2024-06-21 NOTE — COMMUNITY RESOURCES LIST (ENGLISH)
June 21, 2024           YOUR PERSONALIZED LIST OF SERVICES & PROGRAMS           & SHELTER    Housing      [facility] [address]  [address] [place] [address] (Distance: 18.1 miles)  Phone: [phone]  Language: English  Fee: Free  Accessibility: Ada accessible      [facility] - Housing Stabilization Services  Phone: [phone]  Website: https://Cor[employer]ateWorld/Housing-Stabilization.html  Language: English  Hours: Mon 9:00 AM - 5:00 PM Tue 9:00 AM - 5:00 PM Wed 9:00 AM - 5:00 PM Thu 9:00 AM - 5:00 PM Fri 9:00 AM - 5:00 PM  Fee: Insurance  Accessibility: Deaf or hard of hearing, Translation services    Case Management      [facility] - Housing search assistance  [address] [place] [address] (Distance: 1.2 miles)  Phone: [phone]  Website: http://www.[employer].Sprinklr  Language: English  Fee: Free  Accessibility: Ada accessible, Deaf or hard of hearing, Blind accommodation      [facility] - Professional Housing Counseling  [address] [place] [address] (Distance: 1.2 miles)  Phone: [phone]  Website: http://www.[employer]ak[employer] Software  Language: English  Fee: Free  Accessibility: Ada accessible, Deaf or hard of hearing, Blind accommodation      [facility]  Phone: [phone]  Email: [email or web address]  Website: [email or web address]ARMA/  Language: English  Fee: Free, Insurance  Accessibility: Ada accessible, Blind accommodation, Deaf or hard of hearing, Translation services  Transportation Options: Free transportation    Drop-In Services      [facility]  [address] [place] [address] (Distance: 1.2 miles)  Phone: [phone]  Website: http://www.[employer] Software  Language: English  Fee: Free  Accessibility: Ada accessible, Deaf or hard of hearing, Blind accommodation      [facility]  [address] [place] [place] [address] (Distance: 17.7 miles)  Phone: [phone]  Website: https://www.allsaintscg.org/  Language: English, Iranian  Fee: Free  Accessibility: Blind accommodation, Deaf or hard of hearing      [employer] [facility]  Phone: [phone]  Website: http://www.spinalcordwi.org/services.html  Language: English               IMPORTANT NUMBERS & WEBSITES        Emergency Services  911  .   United Way  211 http://211unitedway.org  .   Poison Control  (893) 425-8742 http://[place]poison.org http://wis[place]poison.org  .     Suicide and Crisis Lifeline  988 http://988SureSpeakline.org  .   Child[employer] [facility] Child Abuse Hotline  690.859.5757 http://Childhelphotline.org   .   National Sexual Assault Hotline  (103) 607-7577 (HOPE) http://Bringrr.org   .     National Runaway Safeline  (500) 708-2973 (RUNAWAY) http://Violet.GigsJam  .   Pregnancy & Postpartum Support  Call/text 151-281-7622  [place]: http://ppsupportmn.org  [place]: http://Kadient.com/wi  .   Substance Abuse National Helpline ([facility])  895-482-HELP (8365) http://Findtreatment.gov   .                DISCLAIMER: These resources have been generated via the [employer] Platform. [employer] does not endorse any service providers mentioned in this resource list. [employer] does not guarantee that the services mentioned in this resource list will be available to you or will improve your health or wellness.    [facility]

## 2024-06-24 ENCOUNTER — OFFICE VISIT (OUTPATIENT)
Dept: FAMILY MEDICINE | Facility: CLINIC | Age: 19
End: 2024-06-24
Payer: COMMERCIAL

## 2024-06-24 VITALS
WEIGHT: 241 LBS | DIASTOLIC BLOOD PRESSURE: 80 MMHG | BODY MASS INDEX: 44.35 KG/M2 | OXYGEN SATURATION: 96 % | HEIGHT: 62 IN | SYSTOLIC BLOOD PRESSURE: 120 MMHG | HEART RATE: 86 BPM | TEMPERATURE: 98.4 F | RESPIRATION RATE: 16 BRPM

## 2024-06-24 DIAGNOSIS — F41.1 GENERALIZED ANXIETY DISORDER: ICD-10-CM

## 2024-06-24 DIAGNOSIS — Z00.00 ROUTINE GENERAL MEDICAL EXAMINATION AT A HEALTH CARE FACILITY: ICD-10-CM

## 2024-06-24 DIAGNOSIS — Z30.41 ENCOUNTER FOR SURVEILLANCE OF CONTRACEPTIVE PILLS: ICD-10-CM

## 2024-06-24 DIAGNOSIS — J39.2 THROAT IRRITATION: ICD-10-CM

## 2024-06-24 DIAGNOSIS — F33.1 MODERATE EPISODE OF RECURRENT MAJOR DEPRESSIVE DISORDER (H): ICD-10-CM

## 2024-06-24 DIAGNOSIS — Z11.3 SCREENING FOR STDS (SEXUALLY TRANSMITTED DISEASES): Primary | ICD-10-CM

## 2024-06-24 DIAGNOSIS — Z12.4 CERVICAL CANCER SCREENING: ICD-10-CM

## 2024-06-24 LAB
C TRACH DNA SPEC QL PROBE+SIG AMP: NEGATIVE
N GONORRHOEA DNA SPEC QL NAA+PROBE: NEGATIVE

## 2024-06-24 PROCEDURE — 99395 PREV VISIT EST AGE 18-39: CPT | Performed by: NURSE PRACTITIONER

## 2024-06-24 PROCEDURE — 99214 OFFICE O/P EST MOD 30 MIN: CPT | Mod: 25 | Performed by: NURSE PRACTITIONER

## 2024-06-24 PROCEDURE — 87591 N.GONORRHOEAE DNA AMP PROB: CPT | Performed by: NURSE PRACTITIONER

## 2024-06-24 PROCEDURE — 87491 CHLMYD TRACH DNA AMP PROBE: CPT | Performed by: NURSE PRACTITIONER

## 2024-06-24 PROCEDURE — G0145 SCR C/V CYTO,THINLAYER,RESCR: HCPCS | Performed by: NURSE PRACTITIONER

## 2024-06-24 RX ORDER — FLUOXETINE 40 MG/1
40 CAPSULE ORAL DAILY
Qty: 90 CAPSULE | Refills: 3 | Status: SHIPPED | OUTPATIENT
Start: 2024-06-24

## 2024-06-24 RX ORDER — LEVONORGESTREL/ETHIN.ESTRADIOL 0.1-0.02MG
TABLET ORAL
Qty: 84 TABLET | Refills: 4 | Status: SHIPPED | OUTPATIENT
Start: 2024-06-24

## 2024-06-24 NOTE — PROGRESS NOTES
"Preventive Care Visit  [facility] LUIS ALBERTO Banerjee CNP, Nurse Practitioner Primary Care  Jun 24, 2024      Assessment & Plan     Routine general medical examination at a health care facility  Routine screenings and immunizations reviewed.  She is up-to-date on vaccines.  Agreeable to Pap smear and chlamydia screening today.    Screening for STDs (sexually transmitted diseases)  - Chlamydia trachomatis/Neisseria gonorrhoeae by PCR- VAGINAL SELF-SWAB    Cervical cancer screening  - Pap Screen Only - Recommended Age 21 - 24 Years    Generalized anxiety disorder  Stable on fluoxetine, refill provided.  - FLUoxetine (PROZAC) 40 MG capsule; Take 1 capsule (40 mg) by mouth daily    Encounter for surveillance of contraceptive pills  Doing well on OCP.  No concerning side effects.  Has no contraindications.  - levonorgestrel-ethinyl estradiol (AVIANE) 0.1-20 MG-MCG tablet; , TAKE 1 TABLET BY MOUTH DAILY    Throat irritation  Likely due to to postnasal drip from allergies.  Recommend she start over-the-counter nasal spray such as fluticasone nasal spray-2 sprays in each nostril once daily for the next 2 to 4 weeks.  Follow-up if symptoms are not improving.  She can continue on cetirizine.  If she continues to have trouble with allergies, could consider Singulair.    Moderate episode of recurrent major depressive disorder (H)  Doing well on fluoxetine 40 mg daily.  Refill provided.    Recommend follow-up in 1 year, sooner if she is having new or concerning symptoms.    Patient has been advised of split billing requirements and indicates understanding: Yes        BMI  Estimated body mass index is 44.08 kg/m  as calculated from the following:    Height as of this encounter: 1.575 m (5' 2\").    Weight as of this encounter: 109.3 kg (241 lb).       Counseling  Appropriate preventive services were discussed with this patient, including applicable screening as appropriate for fall prevention, nutrition, " physical activity, Tobacco-use cessation, weight loss and cognition.  Checklist reviewing preventive services available has been given to the patient.  Reviewed patient's diet, addressing concerns and/or questions.   Patient is at risk for social isolation and has been provided with information about the benefit of social connection.           Subjective   Paolo is a 19 year old, presenting for the following:  Physical (Pt c/o something feels itchy or stuck or possible allergies. )        6/24/2024     7:50 AM   Additional Questions   Roomed by [facility] Care Directive  Patient does not have a Health Care Directive or Living Will: Discussed advance care planning with patient; however, patient declined at this time.    Paolo is a very pleasant 19-year-old female who presents today for for follow-up exam and for throat irritation, as well chronic conditions and associated medications.  Reports she has had a cold for few weeks.  Her voice is not quite back to normal.  She feels irritation in the back of her throat but cannot see that anything is wrong.  She takes Benadryl as needed but does cause drowsiness.  Taking   cetirizine daily.    She is on fluoxetine for anxiety and depression, states symptoms are well-controlled.  She is on 40 mg and feels stable.  States she has been on fluoxetine for several years.  No suicidal ideation.     On combined hormonal OCP for contraception management.  Tolerating well without any concerns.  Is due for Pap smear and is willing to have this done today.  LMP was 1 month ago.  No spotting or irregular menses.     She is followed by cardiology for familial hypercholesterolemia.  Tolerating statin without side effect.  Did review contraindication in pregnancy if she decides to start a family.                     6/21/2024   General Health   How would you rate your overall physical health? Good   Feel stress (tense, anxious, or unable to sleep) To some extent      (!) STRESS  CONCERN      6/21/2024   Nutrition   Three or more servings of calcium each day? (!) NO   Diet: Regular (no restrictions)   How many servings of fruit and vegetables per day? (!) 0-1   How many sweetened beverages each day? 0-1            6/21/2024   Exercise   Days per week of moderate/strenous exercise 5 days   Average minutes spent exercising at this level 110 min            6/21/2024   Social Factors   Frequency of gathering with friends or relatives Never   Worry food won't last until get money to buy more No   Food not last or not have enough money for food? No   Do you have housing? (Housing is defined as stable permanent housing and does not include staying ouside in a car, in a tent, in an abandoned building, in an overnight shelter, or couch-surfing.) No   Are you worried about losing your housing? No   Lack of transportation? No   Unable to get utilities (heat,electricity)? No   Want help with housing or utility concern? No      (!) HOUSING CONCERN PRESENT(!) SOCIAL CONNECTIONS CONCERN      6/21/2024   Dental   Dentist two times every year? Yes            6/21/2024   TB Screening   Were you born outside of the [place]? No            Today's PHQ-9 Score:       2/21/2024    10:02 PM   PHQ-9 SCORE   PHQ-9 Total Score MyChart 0   PHQ-9 Total Score 0           6/21/2024   Substance Use   Alcohol more than 3/day or more than 7/wk Not Applicable   Do you use any other substances recreationally? No        Social History     Tobacco Use    Smoking status: Never     Passive exposure: Never    Smokeless tobacco: Never   Vaping Use    Vaping status: Never Used   Substance Use Topics    Alcohol use: Never     Comment: very rare    Drug use: Never     Comment: gummies occasionally           6/21/2024   STI Screening   New sexual partner(s) since last STI/HIV test? No        History of abnormal Pap smear: No - age 21-29 PAP every 3 years recommended             6/21/2024   Contraception/Family Planning   Questions about  "contraception or family planning No           Reviewed and updated as needed this visit by Provider                          Review of Systems  CONSTITUTIONAL: NEGATIVE for fever, chills, change in weight  INTEGUMENTARY/SKIN: NEGATIVE for worrisome rashes, moles or lesions  EYES: NEGATIVE for vision changes or irritation  ENT/MOUTH: NEGATIVE for ear, mouth positive for throat problems  RESP: NEGATIVE for significant cough or SOB  BREAST: NEGATIVE for masses, tenderness or discharge  CV: NEGATIVE for chest pain, palpitations or peripheral edema  GI: NEGATIVE for nausea, abdominal pain, heartburn, or change in bowel habits  : NEGATIVE for frequency, dysuria, or hematuria  MUSCULOSKELETAL: NEGATIVE for significant arthralgias or myalgia  NEURO: NEGATIVE for weakness, dizziness or paresthesias  ENDOCRINE: NEGATIVE for temperature intolerance, skin/hair changes  HEME: NEGATIVE for bleeding problems  PSYCHIATRIC: NEGATIVE for changes in mood or affect     Objective    Exam  /80 (BP Location: Right arm, Patient Position: Sitting)   Pulse 86   Temp 98.4  F (36.9  C) (Tympanic)   Resp 16   Ht 1.575 m (5' 2\")   Wt 109.3 kg (241 lb)   LMP 05/26/2024 (Exact Date)   SpO2 96%   BMI 44.08 kg/m     Estimated body mass index is 44.08 kg/m  as calculated from the following:    Height as of this encounter: 1.575 m (5' 2\").    Weight as of this encounter: 109.3 kg (241 lb).    Physical Exam  GENERAL: alert and no distress  EYES: Eyes grossly normal to inspection, PERRL and conjunctivae and sclerae normal  HENT: ear canals and TM's normal, nose and mouth without ulcers or lesions  NECK: no adenopathy, no asymmetry, masses, or scars  RESP: lungs clear to auscultation - no rales, rhonchi or wheezes  BREAST: normal without masses, tenderness or nipple discharge and no palpable axillary masses or adenopathy  CV: regular rate and rhythm, normal S1 S2, no S3 or S4, no murmur, click or rub, no peripheral edema  ABDOMEN: soft, " nontender, no hepatosplenomegaly, no masses and bowel sounds normal   (female) w/bimanual: normal female external genitalia, normal urethral meatus, normal vaginal mucosa, and normal cervix/adnexa/uterus without masses or discharge  MS: no gross musculoskeletal defects noted, no edema  SKIN: no suspicious lesions or rashes  NEURO: Normal strength and tone, mentation intact and speech normal  PSYCH: mentation appears normal, affect normal/bright        Signed Electronically by: LUIS ALBERTO Faulkner CNP

## 2024-06-24 NOTE — PATIENT INSTRUCTIONS
"Recommend over the counter Fluticasone nasal spray (or similar alternative such as mometasone) 2 sprays in each nostril once daily X 2-4 weeks and then as needed.      Patient Education   Preventive Care Advice   This is general advice we often give to help people stay healthy. Your care team may have specific advice just for you. Please talk to your care team about your own preventive care needs.  Lifestyle  Exercise at least 150 minutes each week (30 minutes a day, 5 days a week).  Do muscle strengthening activities 2 days a week. These help control your weight and prevent disease.  No smoking.  Wear sunscreen to prevent skin cancer.  Have your home tested for radon every 2 to 5 years. Radon is a colorless, odorless gas that can harm your lungs. To learn more, go to www.health.[facility].mn.us and search for \"Radon in Homes.\"  Keep guns unloaded and locked up in a safe place like a safe or gun vault, or, use a gun lock and hide the keys. Always lock away bullets separately. To learn more, visit [facility].mn.gov and search for \"safe gun storage.\"  Nutrition  Eat 5 or more servings of fruits and vegetables each day.  Try wheat bread, brown rice and whole grain pasta (instead of white bread, rice, and pasta).  Get enough calcium and vitamin D. Check the label on foods and aim for 100% of the RDA (recommended daily allowance).  Regular exams  Have a dental exam and cleaning every 6 months.  See your health care team every year to talk about:  Any changes in your health.  Any medicines your care team has prescribed.  Preventive care, family planning, and ways to prevent chronic diseases.  Shots (vaccines)   HPV shots (up to age 26), if you've never had them before.  Hepatitis B shots (up to age 59), if you've never had them before.  COVID-19 shot: Get this shot when it's due.  Flu shot: Get a flu shot every year.  Tetanus shot: Get a tetanus shot every 10 years.  Pneumococcal, hepatitis A, and RSV shots: Ask your care team if you " need these based on your risk.  Shingles shot (for age 50 and up).  General health tests  Diabetes screening:  Starting at age 35, Get screened for diabetes at least every 3 years.  If you are younger than age 35, ask your care team if you should be screened for diabetes.  Cholesterol test: At age 39, start having a cholesterol test every 5 years, or more often if advised.  Bone density scan (DEXA): At age 50, ask your care team if you should have this scan for osteoporosis (brittle bones).  Hepatitis C: Get tested at least once in your life.  Abdominal aortic aneurysm screening: Talk to your doctor about having this screening if you:  Have ever smoked; and  Are biologically male; and  Are between the ages of 65 and 75.  STIs (sexually transmitted infections)  Before age 24: Ask your care team if you should be screened for STIs.  After age 24: Get screened for STIs if you're at risk. You are at risk for STIs (including HIV) if:  You are sexually active with more than one person.  You don't use condoms every time.  You or a partner was diagnosed with a sexually transmitted infection.  If you are at risk for HIV, ask about PrEP medicine to prevent HIV.  Get tested for HIV at least once in your life, whether you are at risk for HIV or not.  Cancer screening tests  Cervical cancer screening: If you have a cervix, begin getting regular cervical cancer screening tests at age 21. Most people who have regular screenings with normal results can stop after age 65. Talk about this with your provider.  Breast cancer scan (mammogram): If you've ever had breasts, begin having regular mammograms starting at age 40. This is a scan to check for breast cancer.  Colon cancer screening: It is important to start screening for colon cancer at age 45.  Have a colonoscopy test every 10 years (or more often if you're at risk) Or, ask your provider about stool tests like a FIT test every year or Cologuard test every 3 years.  To learn more  about your testing options, visit: www.[employer]/348802.pdf.  For help making a decision, visit: matthew.francis/xy19070.  Prostate cancer screening test: If you have a prostate and are age 55 to 69, ask your provider if you would benefit from a yearly prostate cancer screening test.  Lung cancer screening: If you are a current or former smoker age 50 to 80, ask your care team if ongoing lung cancer screenings are right for you.  For informational purposes only. Not to replace the advice of your health care provider. Copyright   2023 [employer] All rights reserved. Clinically reviewed by the [facility]. [employer] 828938 - REV 04/24.  Relationships for Good Health  Relationships are important for our health and happiness. Social isolation, loneliness and lack of support are bad for your health. Studies show that loneliness can harm health and limit your life span as much as high blood pressure and smoking.   Take some time to reflect on your relationships. Then answer these questions:  Are there people in your life that cause you stress or drain your energy? What can you do to set limits?  ________________________________________________________________________________________________________________________________________________________________________________________________________________________________________________________________________________________________________________________________________________  Who do you enjoy spending time with? Who can you go to for support?  ________________________________________________________________________________________________________________________________________________________________________________________________________________________________________________________________________________________________________________________________________________  What can you do to improve your relationships with  others?  __________________________________________________________________________________________________________________________________________________________________________________________________________________  ______________________________________________________________________________________________________________________________  What do you like most about your relationships with others?  ________________________________________________________________________________________________________________________________________________________________________________________________________________________________________________________________________________________________________________________________________________  My goal: ______________________________________________________________________  I will ______________________________________________________________________________________________________________________________________________________________________________________________    For informational purposes only. Not to replace the advice of your health care provider. Copyright   2018 [facility]. All rights reserved. Clinically reviewed by [facility]  Team. [employer] 930465 - Rev 04/21.

## 2024-06-27 LAB
BKR LAB AP GYN ADEQUACY: NORMAL
BKR LAB AP GYN INTERPRETATION: NORMAL
BKR LAB AP HPV REFLEX: NO
BKR LAB AP LMP: NORMAL
BKR LAB AP PREVIOUS ABNORMAL: NORMAL
PATH REPORT.COMMENTS IMP SPEC: NORMAL
PATH REPORT.COMMENTS IMP SPEC: NORMAL
PATH REPORT.RELEVANT HX SPEC: NORMAL

## 2024-08-18 DIAGNOSIS — J30.2 SEASONAL ALLERGIC RHINITIS, UNSPECIFIED TRIGGER: ICD-10-CM

## 2024-08-19 RX ORDER — CETIRIZINE HYDROCHLORIDE 10 MG/1
10 TABLET ORAL DAILY
Qty: 90 TABLET | Refills: 0 | Status: SHIPPED | OUTPATIENT
Start: 2024-08-19

## 2024-12-03 DIAGNOSIS — J30.2 SEASONAL ALLERGIC RHINITIS, UNSPECIFIED TRIGGER: ICD-10-CM

## 2024-12-04 RX ORDER — CETIRIZINE HYDROCHLORIDE 10 MG/1
10 TABLET ORAL DAILY
Qty: 90 TABLET | Refills: 1 | Status: SHIPPED | OUTPATIENT
Start: 2024-12-04

## 2024-12-28 NOTE — TELEPHONE ENCOUNTER
---------------------  From: Marsha Lombardi CMA (Phone Messages Pool ([id number]_[facility]))   To: ARM Message Pool ([facility]224[facility]);     Sent: 11/24/2020 3:01:37 PM CST  Subject: Phone Message     Phone Message    PCP:   ARM      Time of Call:  1448       Person Calling:  richi Berrios  Phone number:  [phone], LDM    Returned call at: _    Note:   Wonders if they were suppose to contact the dietician to schedule or if the dietician office would reach out to the family to schedule? Please advise it doesn't look like dietician referral was place. Yash would like this done.  Will have her see integrative medicine for further direction on dietary changes that may help with weight loss.     Last office visit and reason:  10/12/20 f/u thyroid ARM---------------------  From: Mirta Lopez (ARM Message Pool ([id number]_[facility]))   To: Kaylynn Ferraro MD;     Sent: 11/24/2020 3:52:39 PM CST  Subject: FW: Phone Message---------------------  From: Kaylynn Freraro MD   To: Appointment Pool ([id number]_[place]);     Cc: ARM Message Pool ([id number]_[place] [facility] [place]);      Sent: 11/24/2020 6:59:13 PM CST  Subject: RE: Phone Message     Please call family to schedule an appointment with Valeri Carbajal for integrative medicine consult.  Thanks!---------------------  From: Mirta Lopez (ARM Message Pool (32224_[facility]))   To: Appointment Pool ([id number]_[place]);     Sent: 11/24/2020 7:11:24 PM CST  Subject: FW: Phone Message     Please review ARM's response below. thanksLMX1 ON VM (PILAR)scheduled  
no

## 2025-01-24 NOTE — PATIENT INSTRUCTIONS
-Lab draw on 4/22/2024; if thyroid function tests are borderline, we can consider starting thyroid hormone supplement  -Follow-up in 1 year, with thyroid ultrasound and labs before visit (we will fax orders for ultrasound to [facility])  -Contact me sooner if noticing persisting neck tenderness, prominence of the thyroid, changes in swallowing, voice or breathing   -We will communicate results via MyChart, or if needed by phone  
equal bilaterally

## 2025-02-02 DIAGNOSIS — E78.01 FAMILIAL HYPERCHOLESTEREMIA: ICD-10-CM

## 2025-02-02 DIAGNOSIS — E78.5 DYSLIPIDEMIA WITH ELEVATED LOW DENSITY LIPOPROTEIN (LDL) CHOLESTEROL AND ABNORMALLY LOW HIGH DENSITY LIPOPROTEIN (HDL) CHOLESTEROL: ICD-10-CM

## 2025-02-06 RX ORDER — ROSUVASTATIN CALCIUM 40 MG/1
40 TABLET, COATED ORAL DAILY
Qty: 90 TABLET | Refills: 1 | Status: SHIPPED | OUTPATIENT
Start: 2025-02-06

## 2025-03-17 ENCOUNTER — LAB (OUTPATIENT)
Dept: LAB | Facility: CLINIC | Age: 20
End: 2025-03-17
Payer: COMMERCIAL

## 2025-03-17 DIAGNOSIS — E06.3 HASHIMOTO'S THYROIDITIS: ICD-10-CM

## 2025-03-17 PROCEDURE — 84443 ASSAY THYROID STIM HORMONE: CPT

## 2025-03-17 PROCEDURE — 84439 ASSAY OF FREE THYROXINE: CPT

## 2025-03-17 PROCEDURE — 36415 COLL VENOUS BLD VENIPUNCTURE: CPT

## 2025-03-18 LAB
T4 FREE SERPL-MCNC: 1.03 NG/DL (ref 0.9–1.7)
TSH SERPL DL<=0.005 MIU/L-ACNC: 4.76 UIU/ML (ref 0.3–4.2)

## 2025-03-19 ENCOUNTER — TELEPHONE (OUTPATIENT)
Dept: ENDOCRINOLOGY | Facility: CLINIC | Age: 20
End: 2025-03-19

## 2025-03-19 ENCOUNTER — OFFICE VISIT (OUTPATIENT)
Dept: ENDOCRINOLOGY | Facility: CLINIC | Age: 20
End: 2025-03-19
Payer: COMMERCIAL

## 2025-03-19 VITALS
BODY MASS INDEX: 44.81 KG/M2 | WEIGHT: 245 LBS | DIASTOLIC BLOOD PRESSURE: 84 MMHG | SYSTOLIC BLOOD PRESSURE: 116 MMHG | HEART RATE: 88 BPM

## 2025-03-19 DIAGNOSIS — E06.3 HASHIMOTO'S THYROIDITIS: Primary | ICD-10-CM

## 2025-03-19 DIAGNOSIS — E66.813 CLASS 3 SEVERE OBESITY WITH SERIOUS COMORBIDITY AND BODY MASS INDEX (BMI) OF 40.0 TO 44.9 IN ADULT, UNSPECIFIED OBESITY TYPE (H): ICD-10-CM

## 2025-03-19 DIAGNOSIS — E03.8 SUBCLINICAL HYPOTHYROIDISM: ICD-10-CM

## 2025-03-19 DIAGNOSIS — E66.01 CLASS 3 SEVERE OBESITY WITH SERIOUS COMORBIDITY AND BODY MASS INDEX (BMI) OF 40.0 TO 44.9 IN ADULT, UNSPECIFIED OBESITY TYPE (H): ICD-10-CM

## 2025-03-19 RX ORDER — LEVOTHYROXINE SODIUM 25 UG/1
25 TABLET ORAL
Qty: 30 TABLET | Refills: 4 | Status: SHIPPED | OUTPATIENT
Start: 2025-03-19

## 2025-03-19 NOTE — LETTER
3/19/2025      Mirna Carl  [address] [place] [address]      Dear Colleague,    Thank you for referring your patient, Mirna Carl, to the [facility]. Please see a copy of my visit note below.      ENDOCRINOLOGY FOLLOW-UP        HISTORY OF PRESENT ILLNESS    Mirna Phelpsprefers to be called Peace Carl is seen in follow-up.    Consent was obtained from the patient to use an AI scribe documentation tool in the creation of this note.    Paolo has noticed in the interim since last visit that a particular necklace tends to fit more tightly, although she is uncertain if it is because of the material the necklaces made of (plastic).  She has not noted overt dysphagia, hoarseness, or stridor.    She did not have a chance to complete thyroid ultrasound prior to our visit.    Reports low energy levels, but attributes it to being busy with school, work, and family events. Has experienced extremes in temperature sensitivity, feeling either very hot and sweating or freezing cold.    Paolo has had some concerns regarding weight management: She has gained about 15 pounds in the interim since last visit despite aiming for whole food diet and remaining regularly physically active (she is physically active at her work).  She previously saw Dr. Crowe and was prescribed Saxenda: This was not covered by insurance.  She was prescribed Vyvanse for binge eating disorder.  She is very much interested in medications for weight management.    No history of pancreatitis.  No personal or family history of medullary thyroid carcinoma.    Her mother has also raised the question of whether she may have celiac disease given intermittent digestive issues including lactose intolerance.  Patient wonders if we can screen for this.    Medication History  - Currently on oral contraceptives, with regular periods and no issues reported.Pertinent endocrine and related history:  1.  Hashimoto's  thyroiditis.  Positive thyroid peroxidase antibody in 2020.  Normal thyroid function tests.  2.  History of thyroid nodules.  Discovered on physical exam while she was in eighth grade, for which she was referred to endocrinology. Previous thyroid ultrasounds revealed small nodules and heterogenous echotexture of the thyroid.  -9/28/2018, [facility]: Moderately heterogenous gland. 6 x 6 x 4 mm hypoechoic nodular area in the inferior right lobe, no vascularity or calcifications. 7 x 3 x 4 mm hypoechoic nodular area in the left mid thyroid, no vascularity or calcifications.  -10/16/2020, [facility]: Heterogenous echotexture of thyroid. 6 x 6 x 3 mm right mid thyroid nodule, hypoechoic, solid.  Previously seen left thyroid nodule was not present on this exam.  3.  Obesity.  History of consistent weight gain since early childhood, for which she has been referred to weight management clinic. She notes she had a 15lb weight gain since 01/2022 despite having an active job as a  and making dietary changes. Patient diagnosed with binge eating disorder and started on Vyvanse in 08/2022, following which she experienced a 4 lb weight loss. [facility] was [facility] to [facility] and underwent an intake, and was recommended to join their intensive day program. This is not compatible with her school schedule, so she has not followed up further. Patient has been on combined OCP since age 12 for heavy menstrual cycles. Notes her menstrual cycles have been regular, except one missed period 2 months ago. She had a normal cycle the following month.   4. Hyperlipidemia. Evaluated in cardiology, suspected to be due to familial hyperlipidemia.  On Crestor.    Pertinent Social History: Patient is a college student at [employer] and will graduate in May 2025. She works as a  at [employer] or [employer] and also and camp [employer] in the summer.     PAST MEDICAL HISTORY  Past Medical History:   Diagnosis Date      Anxiety disorder 03/28/2022     Depressive disorder      Hashimoto's thyroiditis 03/28/2022     Moderate episode of recurrent major depressive disorder (H) 03/28/2022     Seasonal allergic rhinitis 03/28/2022     Uncomplicated asthma        MEDICATIONS  Current Outpatient Medications   Medication Sig Dispense Refill     cetirizine (ZYRTEC) 10 MG tablet TAKE 1 TABLET BY MOUTH DAILY 90 tablet 1     FLUoxetine (PROZAC) 40 MG capsule Take 1 capsule (40 mg) by mouth daily 90 capsule 3     levonorgestrel-ethinyl estradiol (AVIANE) 0.1-20 MG-MCG tablet , TAKE 1 TABLET BY MOUTH DAILY 84 tablet 4     levothyroxine (SYNTHROID/LEVOTHROID) 25 MCG tablet Take 1 tablet (25 mcg) by mouth every morning (before breakfast). 30 tablet 4     rosuvastatin (CRESTOR) 40 MG tablet TAKE 1 TABLET(40 MG) BY MOUTH DAILY 90 tablet 1     tirzepatide-Weight Management (ZEPBOUND) 2.5 MG/0.5ML prefilled pen Inject 0.5 mLs (2.5 mg) subcutaneously every 7 days. 2 mL 1       Allergies, family, and social history were reviewed and documented as needed in EHR.     REVIEW OF SYSTEMS  A focused ROS was performed, with pertinent positives and negatives as noted in the HPI.    PHYSICAL EXAM  /84 (BP Location: Right arm, Patient Position: Sitting, Cuff Size: Adult Large)   Pulse 88   Wt 111.1 kg (245 lb)   BMI 44.81 kg/m    Body mass index is 44.81 kg/m .  Constitutional: Vital signs reviewed, as recorded above. Patient is alert, oriented and appears in no acute distress.  Eyes: PER, EOMI, no stare, lid lag, or retraction; no conjunctival injection.  Neck: Neck supple, thyroid is palpable, no nodules on exam, slight tenderness with palpation over the right lobe of the thyroid.  Thyroid at upper limit of normal size and left lobe more prominent.  Lymphatic: No cervical or supraclavicular LAD.  Cardiovascular: RRR, normal S1/S2, no audible murmurs, rubs or gallops.  Respiratory: CTAB, without wheezes, crackles or rhonchi; normal chest wall motion and  respiratory effort.  MSK: No clubbing or cyanosis; normal muscle bulk and tone.  Skin: Normal skin color, temperature, turgor and texture.  Neurological: Alert and oriented times 3.     DATA REVIEW  Each of the following laboratory and/or imaging studies were reviewed.              ASSESSMENT  1.  Hashimoto's thyroiditis.  Based on positive TPO antibody and heterogenous appearance of thyroid on ultrasound.  Labs drawn prior to this visit are consistent with subclinical hypothyroidism.  Given coexisting autoimmune thyroid disease, would move forward with start of levothyroxine.  We reviewed how best to take medication to maximize its absorption.  We reviewed potential symptoms of over replacement.    2.  History of thyroid nodules.  Normal calcitonin in 9/2022.  Most recent thyroid ultrasound from 3/2023 shows heterogenous gland with subcentimeter nodular areas, suspect pseudo nodules; there is a more prominent left mid lobe nodularity, although this also looks pseudonodular.  In the absence of discrete nodules, I have recommended careful follow-up: Patient did not have a chance to complete thyroid ultrasound prior to this visit but will do so at her earliest convenience.    3.  Obesity.  BMI 44.8.  With coexisting hyperlipidemia.  Has been working on lifestyle changes for a few years without significant success with weight loss.  Interested in medical therapy to support weight loss: Would consider GLP-1/GIP receptor agonist therapy.  We reviewed benefits as well as potential side effects of this class of drugs; we also discussed potential for malabsorption of OCP (she takes OCP for PMDD and not for birth control).  I will refer to [facility] pharmacist for continued titration of medication.  In the meantime, patient will continue lifestyle changes.    PLAN  -Start levothyroxine 25 micrograms once a day; Take levothyroxine on an empty stomach first thing in the morning with water and separate from coffee by at least 30  minutes.  Please separate most other medications from levothyroxine by 30-60 minutes--any supplements that contain iron, magnesium or calcium (including multivitamins) should be  from levothyroxine by at least 4 hours.  -If you miss a dose of levothyroxine, you can take two pills the next day.  -Lab draw in 6 weeks to check thyroid function tests   -Start Zepbound 2.5 mg weekly (I demonstrated injection technique to patient in clinic today and will give her online resources for injection; she will update us if interested in nurse instruction)  -The following website has information on injection technique for Zepbound (also update our clinic if you would like to meet with nurse for injection teaching): [email or web address]-to-use  -I have placed referral to meet with [facility] pharmacist to continue to adjust Zepbound dose upward  -Absorption of oral contraceptive may be affected by Zepbound, especially during dosage adjustment  -Please schedule thyroid ultrasound when able  - Plan for a follow-up appointment in late summer or early fall 2025      Orders Placed This Encounter   Procedures     IgA     Tissue transglutaminase mary IgA and IgG     TSH with free T4 reflex     Med Therapy Management Referral       I personally spent a total of 50 minutes on the date of encounter reviewing medical records, evaluating the patient, coordinating care and documenting in the EHR, as detailed above.    Kay Crawley MD   Division of Diabetes, Endocrinology and Metabolism  Department of Medicine      cc: Kaylynn Ferraro MD             Again, thank you for allowing me to participate in the care of your patient.        Sincerely,        Kay Crawley MD    Electronically signed No pertinent past medical history

## 2025-03-19 NOTE — PATIENT INSTRUCTIONS
-Start levothyroxine 25 micrograms once a day; Take levothyroxine on an empty stomach first thing in the morning with water and separate from coffee by at least 30 minutes.  Please separate most other medications from levothyroxine by 30-60 minutes--any supplements that contain iron, magnesium or calcium (including multivitamins) should be  from levothyroxine by at least 4 hours.  -If you miss a dose of levothyroxine, you can take two pills the next day.  -Lab draw in 6 weeks to check thyroid function tests   -Start Zepbound 2.5 mg weekly  -The following website has information on injection technique for Zepbound (also update our clinic if you would like to meet with nurse for injection teaching): [email or web address]/how-to-use  -I have placed referral to meet with [facility] pharmacist to continue to adjust Zepbound dose upward  -Absorption of oral contraceptive may be affected by Zepbound, especially during dosage adjustment  -Please schedule thyroid ultrasound when able  - Plan for a follow-up appointment in late summer or early fall 2025

## 2025-03-19 NOTE — PROGRESS NOTES
ENDOCRINOLOGY FOLLOW-UP        HISTORY OF PRESENT ILLNESS    Mirna (prefers to be called Paolo) SALOMÓN Carl is seen in follow-up.    Consent was obtained from the patient to use an AI scribe documentation tool in the creation of this note.    Paolo has noticed in the interim since last visit that a particular necklace tends to fit more tightly, although she is uncertain if it is because of the material the necklaces made of (plastic).  She has not noted overt dysphagia, hoarseness, or stridor.    She did not have a chance to complete thyroid ultrasound prior to our visit.    Reports low energy levels, but attributes it to being busy with school, work, and family events. Has experienced extremes in temperature sensitivity, feeling either very hot and sweating or freezing cold.    Paolo has had some concerns regarding weight management: She has gained about 15 pounds in the interim since last visit despite aiming for whole food diet and remaining regularly physically active (she is physically active at her work).  She previously saw Dr. Crowe and was prescribed Saxenda: This was not covered by insurance.  She was prescribed Vyvanse for binge eating disorder.  She is very much interested in medications for weight management.    No history of pancreatitis.  No personal or family history of medullary thyroid carcinoma.    Her mother has also raised the question of whether she may have celiac disease given intermittent digestive issues including lactose intolerance.  Patient wonders if we can screen for this.    Medication History  - Currently on oral contraceptives, with regular periods and no issues reported.Pertinent endocrine and related history:  1.  Hashimoto's thyroiditis.  Positive thyroid peroxidase antibody in 2020.  Normal thyroid function tests.  2.  History of thyroid nodules.  Discovered on physical exam while she was in eighth grade, for which she was referred to endocrinology. Previous thyroid  ultrasounds revealed small nodules and heterogenous echotexture of the thyroid.  -9/28/2018, [facility]: Moderately heterogenous gland. 6 x 6 x 4 mm hypoechoic nodular area in the inferior right lobe, no vascularity or calcifications. 7 x 3 x 4 mm hypoechoic nodular area in the left mid thyroid, no vascularity or calcifications.  -10/16/2020, [facility]: Heterogenous echotexture of thyroid. 6 x 6 x 3 mm right mid thyroid nodule, hypoechoic, solid.  Previously seen left thyroid nodule was not present on this exam.  3.  Obesity.  History of consistent weight gain since early childhood, for which she has been referred to weight management clinic. She notes she had a 15lb weight gain since 01/2022 despite having an active job as a  and making dietary changes. Patient diagnosed with binge eating disorder and started on Vyvanse in 08/2022, following which she experienced a 4 lb weight loss. She was referred to [facility] and underwent an intake, and was recommended to join their intensive day program. This is not compatible with her school schedule, so she has not followed up further. Patient has been on combined OCP since age 12 for heavy menstrual cycles. Notes her menstrual cycles have been regular, except one missed period 2 months ago. She had a normal cycle the following month.   4. Hyperlipidemia. Evaluated in cardiology, suspected to be due to familial hyperlipidemia.  On Crestor.    Pertinent Social History: Patient is a college student at [employer] and will graduate in May 2025. She works as a  at [employer] or [employer] and also and camp [employer] in the summer.     PAST MEDICAL HISTORY  Past Medical History:   Diagnosis Date    Anxiety disorder 03/28/2022    Depressive disorder     Hashimoto's thyroiditis 03/28/2022    Moderate episode of recurrent major depressive disorder (H) 03/28/2022    Seasonal allergic rhinitis 03/28/2022    Uncomplicated asthma         MEDICATIONS  Current Outpatient Medications   Medication Sig Dispense Refill    cetirizine (ZYRTEC) 10 MG tablet TAKE 1 TABLET BY MOUTH DAILY 90 tablet 1    FLUoxetine (PROZAC) 40 MG capsule Take 1 capsule (40 mg) by mouth daily 90 capsule 3    levonorgestrel-ethinyl estradiol (AVIANE) 0.1-20 MG-MCG tablet , TAKE 1 TABLET BY MOUTH DAILY 84 tablet 4    levothyroxine (SYNTHROID/LEVOTHROID) 25 MCG tablet Take 1 tablet (25 mcg) by mouth every morning (before breakfast). 30 tablet 4    rosuvastatin (CRESTOR) 40 MG tablet TAKE 1 TABLET(40 MG) BY MOUTH DAILY 90 tablet 1    tirzepatide-Weight Management (ZEPBOUND) 2.5 MG/0.5ML prefilled pen Inject 0.5 mLs (2.5 mg) subcutaneously every 7 days. 2 mL 1       Allergies, family, and social history were reviewed and documented as needed in EHR.     REVIEW OF SYSTEMS  A focused ROS was performed, with pertinent positives and negatives as noted in the HPI.    PHYSICAL EXAM  /84 (BP Location: Right arm, Patient Position: Sitting, Cuff Size: Adult Large)   Pulse 88   Wt 111.1 kg (245 lb)   BMI 44.81 kg/m    Body mass index is 44.81 kg/m .  Constitutional: Vital signs reviewed, as recorded above. Patient is alert, oriented and appears in no acute distress.  Eyes: PER, EOMI, no stare, lid lag, or retraction; no conjunctival injection.  Neck: Neck supple, thyroid is palpable, no nodules on exam, slight tenderness with palpation over the right lobe of the thyroid.  Thyroid at upper limit of normal size and left lobe more prominent.  Lymphatic: No cervical or supraclavicular LAD.  Cardiovascular: RRR, normal S1/S2, no audible murmurs, rubs or gallops.  Respiratory: CTAB, without wheezes, crackles or rhonchi; normal chest wall motion and respiratory effort.  MSK: No clubbing or cyanosis; normal muscle bulk and tone.  Skin: Normal skin color, temperature, turgor and texture.  Neurological: Alert and oriented times 3.     DATA REVIEW  Each of the following laboratory and/or  imaging studies were reviewed.              ASSESSMENT  1.  Hashimoto's thyroiditis.  Based on positive TPO antibody and heterogenous appearance of thyroid on ultrasound.  Labs drawn prior to this visit are consistent with subclinical hypothyroidism.  Given coexisting autoimmune thyroid disease, would move forward with start of levothyroxine.  We reviewed how best to take medication to maximize its absorption.  We reviewed potential symptoms of over replacement.    2.  History of thyroid nodules.  Normal calcitonin in 9/2022.  Most recent thyroid ultrasound from 3/2023 shows heterogenous gland with subcentimeter nodular areas, suspect pseudo nodules; there is a more prominent left mid lobe nodularity, although this also looks pseudonodular.  In the absence of discrete nodules, I have recommended careful follow-up: Patient did not have a chance to complete thyroid ultrasound prior to this visit but will do so at her earliest convenience.    3.  Obesity.  BMI 44.8.  With coexisting hyperlipidemia.  Has been working on lifestyle changes for a few years without significant success with weight loss.  Interested in medical therapy to support weight loss: Would consider GLP-1/GIP receptor agonist therapy.  We reviewed benefits as well as potential side effects of this class of drugs; we also discussed potential for malabsorption of OCP (she takes OCP for PMDD and not for birth control).  I will refer to [facility] pharmacist for continued titration of medication.  In the meantime, patient will continue lifestyle changes.    PLAN  -Start levothyroxine 25 micrograms once a day; Take levothyroxine on an empty stomach first thing in the morning with water and separate from coffee by at least 30 minutes.  Please separate most other medications from levothyroxine by 30-60 minutes--any supplements that contain iron, magnesium or calcium (including multivitamins) should be  from levothyroxine by at least 4 hours.  -If you miss a  dose of levothyroxine, you can take two pills the next day.  -Lab draw in 6 weeks to check thyroid function tests   -Start Zepbound 2.5 mg weekly (I demonstrated injection technique to patient in clinic today and will give her online resources for injection; she will update us if interested in nurse instruction)  -The following website has information on injection technique for Zepbound (also update our clinic if you would like to meet with nurse for injection teaching): [email or web address]/weight/how-to-use  -I have placed referral to meet with [facility] pharmacist to continue to adjust Zepbound dose upward  -Absorption of oral contraceptive may be affected by Zepbound, especially during dosage adjustment  -Please schedule thyroid ultrasound when able  - Plan for a follow-up appointment in late summer or early fall 2025      Orders Placed This Encounter   Procedures    IgA    Tissue transglutaminase mary IgA and IgG    TSH with free T4 reflex    Med Therapy Management Referral       I personally spent a total of 50 minutes on the date of encounter reviewing medical records, evaluating the patient, coordinating care and documenting in the EHR, as detailed above.    Kay Crawley MD   Division of Diabetes, Endocrinology and Metabolism  Department of Medicine      cc: Kaylynn Ferraro MD

## 2025-03-19 NOTE — TELEPHONE ENCOUNTER
PA Initiation    Medication: ZEPBOUND 2.5 MG/0.5ML SC SOAJ  Insurance Company: [employer] - Phone [phone] Fax [phone]  Pharmacy Filling the Rx:    Filling Pharmacy Phone:    Filling Pharmacy Fax:    Start Date: 3/19/2025    [id number]

## 2025-04-15 ENCOUNTER — TELEPHONE (OUTPATIENT)
Dept: ENDOCRINOLOGY | Facility: CLINIC | Age: 20
End: 2025-04-15
Payer: COMMERCIAL

## 2025-04-15 NOTE — TELEPHONE ENCOUNTER
WM medications are excluded by the patient's plan. If she would like to discuss cash-pay options, please have patient call our schedulers back at [phone].    Options for continuing GLP1/GIP agonist in 2025:  Pay out of pocket for Wegovy or Zepbound pens (>$1000/month cash price without savings card)   Zepbound cost with Zepbound savings card (link below to sign up for this): $650/month with card  https://www.enrollment.[employer]/enroll/checkEnrollment  Wegovy cost with Wegovy savings card (link below to sign up for this): $650/month with card   https://www.[employer]/coverage-and-savings/save-on-wegovy.html    Zepbound Vials through [facility] CASH [facility]Y pharmacy - vials only available in 2.5 mg, 5 mg, 7.5mg, 10 mg doses  [email or web address]://[email or web address]/pharmacy/zepbound  $349/month for 2.5mg vials  $499/month for 5mg vials   7.5 mg and 10 mg vials now available with as well for $499/month with regular refills (cost increases if refills not consistently filled - see more info at [facility] website)  $5 per month for administrations supplies (syringe/needles, etc)     Liraglutide daily injections may be a more affordable option with GoodRx coupon (around $330/month at some pharmacies)    Quinn Giron, PharmD, [facility]  Endocrine & Diabetes MTM Pharmacist  [facility]  Diabetes & Endocrinology Clinic  [address] [place] [id number]    Contact information:   To schedule a MTM appointment: [phone]  For questions or concerns, please send a [employer] message (preferred) or call the clinic at [phone].    For more urgent concerns that do not require [phone], please call [phone] after hours/weekends and ask to speak with the Endocrinologist on call.

## 2025-04-15 NOTE — TELEPHONE ENCOUNTER
Attempted to contact patient to reschedule appt with Dr. Pollard. Voice message left for patient. Appts rescheduled, pt verified. Appt letter placed in mail.    Upcoming MTM appointment canceled via Mychart, we made one more attempt to reschedule.     Routing back to referring provider and MTM Pharmacist Team    Amanda Gonzalez CPhT  MTM

## 2025-04-30 ENCOUNTER — LAB (OUTPATIENT)
Dept: LAB | Facility: CLINIC | Age: 20
End: 2025-04-30
Payer: COMMERCIAL

## 2025-04-30 DIAGNOSIS — E06.3 HASHIMOTO'S THYROIDITIS: ICD-10-CM

## 2025-04-30 DIAGNOSIS — Z13.6 SCREENING FOR CARDIOVASCULAR CONDITION: Primary | ICD-10-CM

## 2025-04-30 DIAGNOSIS — E78.01 FAMILIAL HYPERCHOLESTEREMIA: ICD-10-CM

## 2025-04-30 PROCEDURE — 84443 ASSAY THYROID STIM HORMONE: CPT

## 2025-04-30 PROCEDURE — 86364 TISS TRNSGLTMNASE EA IG CLAS: CPT

## 2025-04-30 PROCEDURE — 82784 ASSAY IGA/IGD/IGG/IGM EACH: CPT

## 2025-04-30 PROCEDURE — 80061 LIPID PANEL: CPT

## 2025-04-30 PROCEDURE — 36415 COLL VENOUS BLD VENIPUNCTURE: CPT

## 2025-05-01 LAB
CHOLEST SERPL-MCNC: 159 MG/DL
FASTING STATUS PATIENT QL REPORTED: NO
HDLC SERPL-MCNC: 53 MG/DL
IGA SERPL-MCNC: 124 MG/DL (ref 84–499)
LDLC SERPL CALC-MCNC: 94 MG/DL
NONHDLC SERPL-MCNC: 106 MG/DL
TRIGL SERPL-MCNC: 60 MG/DL
TSH SERPL DL<=0.005 MIU/L-ACNC: 2.41 UIU/ML (ref 0.3–4.2)
TTG IGA SER-ACNC: 5.9 U/ML
TTG IGG SER-ACNC: 5.3 U/ML

## 2025-05-19 ENCOUNTER — RESULTS FOLLOW-UP (OUTPATIENT)
Dept: ENDOCRINOLOGY | Facility: CLINIC | Age: 20
End: 2025-05-19

## 2025-05-26 ENCOUNTER — PATIENT OUTREACH (OUTPATIENT)
Dept: CARE COORDINATION | Facility: CLINIC | Age: 20
End: 2025-05-26
Payer: COMMERCIAL

## 2025-05-31 ENCOUNTER — MYC MEDICAL ADVICE (OUTPATIENT)
Dept: ENDOCRINOLOGY | Facility: CLINIC | Age: 20
End: 2025-05-31
Payer: COMMERCIAL

## 2025-05-31 DIAGNOSIS — E04.2 MULTIPLE THYROID NODULES: ICD-10-CM

## 2025-05-31 DIAGNOSIS — E06.3 HASHIMOTO'S THYROIDITIS: Primary | ICD-10-CM

## 2025-06-01 DIAGNOSIS — J30.2 SEASONAL ALLERGIC RHINITIS, UNSPECIFIED TRIGGER: ICD-10-CM

## 2025-06-02 ENCOUNTER — PATIENT OUTREACH (OUTPATIENT)
Dept: CARE COORDINATION | Facility: CLINIC | Age: 20
End: 2025-06-02
Payer: COMMERCIAL

## 2025-06-02 RX ORDER — CETIRIZINE HYDROCHLORIDE 10 MG/1
10 TABLET ORAL DAILY
Qty: 90 TABLET | Refills: 1 | Status: SHIPPED | OUTPATIENT
Start: 2025-06-02

## 2025-06-19 SDOH — HEALTH STABILITY: PHYSICAL HEALTH: ON AVERAGE, HOW MANY MINUTES DO YOU ENGAGE IN EXERCISE AT THIS LEVEL?: 60 MIN

## 2025-06-19 SDOH — HEALTH STABILITY: PHYSICAL HEALTH: ON AVERAGE, HOW MANY DAYS PER WEEK DO YOU ENGAGE IN MODERATE TO STRENUOUS EXERCISE (LIKE A BRISK WALK)?: 5 DAYS

## 2025-06-19 ASSESSMENT — SOCIAL DETERMINANTS OF HEALTH (SDOH): HOW OFTEN DO YOU GET TOGETHER WITH FRIENDS OR RELATIVES?: ONCE A WEEK

## 2025-06-21 DIAGNOSIS — F41.1 GENERALIZED ANXIETY DISORDER: ICD-10-CM

## 2025-06-23 RX ORDER — FLUOXETINE HYDROCHLORIDE 40 MG/1
40 CAPSULE ORAL DAILY
Qty: 90 CAPSULE | Refills: 0 | Status: SHIPPED | OUTPATIENT
Start: 2025-06-23 | End: 2025-06-24

## 2025-06-24 ENCOUNTER — RESULTS FOLLOW-UP (OUTPATIENT)
Dept: FAMILY MEDICINE | Facility: CLINIC | Age: 20
End: 2025-06-24

## 2025-06-24 ENCOUNTER — OFFICE VISIT (OUTPATIENT)
Dept: FAMILY MEDICINE | Facility: CLINIC | Age: 20
End: 2025-06-24
Attending: NURSE PRACTITIONER
Payer: COMMERCIAL

## 2025-06-24 VITALS
HEIGHT: 62 IN | TEMPERATURE: 98.6 F | BODY MASS INDEX: 46.59 KG/M2 | HEART RATE: 91 BPM | OXYGEN SATURATION: 96 % | RESPIRATION RATE: 16 BRPM | SYSTOLIC BLOOD PRESSURE: 120 MMHG | DIASTOLIC BLOOD PRESSURE: 70 MMHG | WEIGHT: 253.2 LBS

## 2025-06-24 DIAGNOSIS — Z51.81 ENCOUNTER FOR THERAPEUTIC DRUG MONITORING: ICD-10-CM

## 2025-06-24 DIAGNOSIS — F90.2 ATTENTION DEFICIT HYPERACTIVITY DISORDER (ADHD), COMBINED TYPE: ICD-10-CM

## 2025-06-24 DIAGNOSIS — E78.01 FAMILIAL HYPERCHOLESTEREMIA: ICD-10-CM

## 2025-06-24 DIAGNOSIS — R89.9 ABNORMAL LABORATORY TEST: ICD-10-CM

## 2025-06-24 DIAGNOSIS — J30.2 SEASONAL ALLERGIC RHINITIS, UNSPECIFIED TRIGGER: ICD-10-CM

## 2025-06-24 DIAGNOSIS — E03.8 SUBCLINICAL HYPOTHYROIDISM: ICD-10-CM

## 2025-06-24 DIAGNOSIS — Z13.1 SCREENING FOR DIABETES MELLITUS: ICD-10-CM

## 2025-06-24 DIAGNOSIS — Z00.00 ROUTINE GENERAL MEDICAL EXAMINATION AT A HEALTH CARE FACILITY: Primary | ICD-10-CM

## 2025-06-24 DIAGNOSIS — Z23 NEED FOR VACCINATION: ICD-10-CM

## 2025-06-24 DIAGNOSIS — E66.01 MORBID OBESITY (H): ICD-10-CM

## 2025-06-24 DIAGNOSIS — Z11.3 SCREENING FOR STDS (SEXUALLY TRANSMITTED DISEASES): ICD-10-CM

## 2025-06-24 DIAGNOSIS — F41.1 GENERALIZED ANXIETY DISORDER: ICD-10-CM

## 2025-06-24 DIAGNOSIS — E78.5 DYSLIPIDEMIA WITH ELEVATED LOW DENSITY LIPOPROTEIN (LDL) CHOLESTEROL AND ABNORMALLY LOW HIGH DENSITY LIPOPROTEIN (HDL) CHOLESTEROL: ICD-10-CM

## 2025-06-24 DIAGNOSIS — Z30.41 ENCOUNTER FOR SURVEILLANCE OF CONTRACEPTIVE PILLS: ICD-10-CM

## 2025-06-24 PROBLEM — F33.1 MODERATE EPISODE OF RECURRENT MAJOR DEPRESSIVE DISORDER (H): Status: RESOLVED | Noted: 2022-03-28 | Resolved: 2025-06-24

## 2025-06-24 LAB
ALBUMIN SERPL BCG-MCNC: 4.1 G/DL (ref 3.5–5.2)
ALP SERPL-CCNC: 117 U/L (ref 40–150)
ALT SERPL W P-5'-P-CCNC: 24 U/L (ref 0–50)
AMPHETAMINES UR QL: NOT DETECTED
AST SERPL W P-5'-P-CCNC: 29 U/L (ref 0–45)
BARBITURATES UR QL SCN: NOT DETECTED
BASOPHILS # BLD AUTO: 0 10E3/UL (ref 0–0.2)
BASOPHILS NFR BLD AUTO: 0 %
BENZODIAZ UR QL SCN: DETECTED
BILIRUB SERPL-MCNC: 0.5 MG/DL
BILIRUBIN DIRECT (ROCHE PRO & PURE): 0.19 MG/DL (ref 0–0.45)
BUPRENORPHINE UR QL: NOT DETECTED
C TRACH DNA SPEC QL PROBE+SIG AMP: NEGATIVE
CANNABINOIDS UR QL: DETECTED
COCAINE UR QL SCN: NOT DETECTED
D-METHAMPHET UR QL: NOT DETECTED
EOSINOPHIL # BLD AUTO: 0.2 10E3/UL (ref 0–0.7)
EOSINOPHIL NFR BLD AUTO: 3 %
ERYTHROCYTE [DISTWIDTH] IN BLOOD BY AUTOMATED COUNT: 12.1 % (ref 10–15)
EST. AVERAGE GLUCOSE BLD GHB EST-MCNC: 105 MG/DL
HBA1C MFR BLD: 5.3 % (ref 0–5.6)
HCT VFR BLD AUTO: 38.4 % (ref 35–47)
HGB BLD-MCNC: 13.3 G/DL (ref 11.7–15.7)
IMM GRANULOCYTES # BLD: 0 10E3/UL
IMM GRANULOCYTES NFR BLD: 0 %
LYMPHOCYTES # BLD AUTO: 2.6 10E3/UL (ref 0.8–5.3)
LYMPHOCYTES NFR BLD AUTO: 28 %
MCH RBC QN AUTO: 29.4 PG (ref 26.5–33)
MCHC RBC AUTO-ENTMCNC: 34.6 G/DL (ref 31.5–36.5)
MCV RBC AUTO: 85 FL (ref 78–100)
METHADONE UR QL SCN: NOT DETECTED
MONOCYTES # BLD AUTO: 0.4 10E3/UL (ref 0–1.3)
MONOCYTES NFR BLD AUTO: 5 %
N GONORRHOEA DNA SPEC QL NAA+PROBE: NEGATIVE
NEUTROPHILS # BLD AUTO: 5.9 10E3/UL (ref 1.6–8.3)
NEUTROPHILS NFR BLD AUTO: 64 %
OPIATES UR QL SCN: NOT DETECTED
OXYCODONE UR QL SCN: NOT DETECTED
PCP UR QL SCN: NOT DETECTED
PLATELET # BLD AUTO: 339 10E3/UL (ref 150–450)
PROT SERPL-MCNC: 7 G/DL (ref 6.4–8.3)
RBC # BLD AUTO: 4.53 10E6/UL (ref 3.8–5.2)
SPECIMEN TYPE: NORMAL
TRICYCLICS UR QL SCN: NOT DETECTED
WBC # BLD AUTO: 9.2 10E3/UL (ref 4–11)

## 2025-06-24 RX ORDER — LISDEXAMFETAMINE DIMESYLATE 30 MG/1
30 CAPSULE ORAL EVERY MORNING
Qty: 30 CAPSULE | Refills: 0 | Status: SHIPPED | OUTPATIENT
Start: 2025-07-24 | End: 2025-08-23

## 2025-06-24 RX ORDER — FLUOXETINE HYDROCHLORIDE 40 MG/1
40 CAPSULE ORAL DAILY
Qty: 90 CAPSULE | Refills: 1 | Status: SHIPPED | OUTPATIENT
Start: 2025-06-24

## 2025-06-24 RX ORDER — LEVOTHYROXINE SODIUM 25 UG/1
25 TABLET ORAL
Qty: 90 TABLET | Refills: 3 | Status: SHIPPED | OUTPATIENT
Start: 2025-06-24

## 2025-06-24 RX ORDER — ROSUVASTATIN CALCIUM 40 MG/1
40 TABLET, COATED ORAL DAILY
Qty: 90 TABLET | Refills: 3 | Status: SHIPPED | OUTPATIENT
Start: 2025-06-24

## 2025-06-24 RX ORDER — LISDEXAMFETAMINE DIMESYLATE 30 MG/1
30 CAPSULE ORAL EVERY MORNING
Qty: 30 CAPSULE | Refills: 0 | Status: SHIPPED | OUTPATIENT
Start: 2025-06-24 | End: 2025-07-24

## 2025-06-24 RX ORDER — CETIRIZINE HYDROCHLORIDE 10 MG/1
10 TABLET ORAL DAILY
Qty: 110 TABLET | Refills: 1 | Status: SHIPPED | OUTPATIENT
Start: 2025-06-24

## 2025-06-24 RX ORDER — LISDEXAMFETAMINE DIMESYLATE 30 MG/1
30 CAPSULE ORAL EVERY MORNING
Qty: 30 CAPSULE | Refills: 0 | Status: SHIPPED | OUTPATIENT
Start: 2025-08-23 | End: 2025-09-22

## 2025-06-24 RX ORDER — LEVONORGESTREL/ETHIN.ESTRADIOL 0.1-0.02MG
TABLET ORAL
Qty: 84 TABLET | Refills: 4 | Status: SHIPPED | OUTPATIENT
Start: 2025-06-24

## 2025-06-24 NOTE — PROGRESS NOTES
Preventive Care Visit  [facility]  Tanya Barrios MD, Family Medicine  Jun 24, 2025      Assessment & Plan   Problem List Items Addressed This Visit       Anxiety disorder    Relevant Medications    FLUoxetine (PROZAC) 40 MG capsule    Seasonal allergic rhinitis    Relevant Medications    cetirizine (ZYRTEC) 10 MG tablet    Attention deficit hyperactivity disorder (ADHD), combined type    Relevant Medications    lisdexamfetamine (VYVANSE) 30 MG capsule    lisdexamfetamine (VYVANSE) 30 MG capsule (Start on 7/24/2025)    lisdexamfetamine (VYVANSE) 30 MG capsule (Start on 8/23/2025)    Other Relevant Orders    Urine Drug Screen Clinic (Completed)    Familial hypercholesteremia    Relevant Medications    rosuvastatin (CRESTOR) 40 MG tablet     Other Visit Diagnoses         Routine general medical examination at a health care facility    -  Primary      Screening for STDs (sexually transmitted diseases)        Relevant Orders    Chlamydia trachomatis/Neisseria gonorrhoeae by PCR- VAGINAL SELF-SWAB      Need for vaccination        Relevant Orders    COVID-19 12+ (PFIZER) (Completed)    TDAP 10-64Y (ADACEL,BOOSTRIX) (Completed)    MENINGOCOCCAL B (BEXSERO ) (Completed)      Screening for diabetes mellitus        Relevant Orders    Hemoglobin A1c (Completed)      Dyslipidemia with elevated low density lipoprotein (LDL) cholesterol and abnormally low high density lipoprotein (HDL) cholesterol        Relevant Medications    rosuvastatin (CRESTOR) 40 MG tablet    Other Relevant Orders    CBC with Platelets & Differential (Completed)    Hepatic panel (Albumin, ALT, AST, Bili, Alk Phos, TP)      Subclinical hypothyroidism        Relevant Medications    levothyroxine (SYNTHROID/LEVOTHROID) 25 MCG tablet      Encounter for surveillance of contraceptive pills        Relevant Medications    levonorgestrel-ethinyl estradiol (AVIANE) 0.1-20 MG-MCG tablet      Morbid obesity (H)        Relevant  Medications    lisdexamfetamine (VYVANSE) 30 MG capsule    lisdexamfetamine (VYVANSE) 30 MG capsule (Start on 7/24/2025)    lisdexamfetamine (VYVANSE) 30 MG capsule (Start on 8/23/2025)    Other Relevant Orders    CBC with Platelets & Differential (Completed)    Hepatic panel (Albumin, ALT, AST, Bili, Alk Phos, TP)                   Assessment & Plan  Screening for STDs:  - Urinalysis for gonorrhea and chlamydia.    Need for vaccination:  - Administer vaccines for COVID, Tdap, and meningitis B.    Screening for diabetes mellitus:  - Hemoglobin A1c test for diabetes screening.    Familial hypercholesteremia:  - Discussed indications for PCSK9 inhibitors and in glycerin in the future. Siblings should be screened.    Dyslipidemia with elevated LDL cholesterol and abnormally low HDL cholesterol:  - Renew Crestor prescription for 90 days with one refill.    Subclinical hypothyroidism:  - TSH within normal range, greater than one. Patient satisfied with current levothyroxine dosage.  - Renew levothyroxine prescription for 90 days with three refills.    Surveillance of contraceptive pills:  - Renew birth control pill prescription.    Generalized anxiety disorder:  - Renew fluoxetine prescription for 90 days with one refill.    Seasonal allergic rhinitis:  - Increase Zyrtec prescription to 110 pills for 90 days, with option to take up to 2 pills twice daily as needed.    ADHD, combined type: and binge eating disorder  - Prescribe Vyvanse 30 mg daily for ADHD and binge eating disorder. Controlled substance agreement updated.  Will get UDS    Morbid obesity:  - Weight management plan with therapeutic lifestyle changes. Follow-up with endocrinology.   Consent was obtained from the patient to use an AI documentation tool in the creation of  this note.  Voice recognition software was also used.  There may be associated transcribing errors.            BMI  Estimated body mass index is 46.31 kg/m  as calculated from the  "following:    Height as of this encounter: 1.575 m (5' 2\").    Weight as of this encounter: 114.9 kg (253 lb 3.2 oz).   Weight management plan: TLC and follow up with her Endocrinologist  Reviewed preventive health counseling, as reflected in patient instructions  Counseling  Appropriate preventive services were addressed with this patient via screening, questionnaire, or discussion as appropriate for fall prevention, nutrition, physical activity, Tobacco-use cessation, social engagement, weight loss and cognition.  Checklist reviewing preventive services available has been given to the patient.  Reviewed patient's diet, addressing concerns and/or questions.           Corky Boone is a 20 year old, presenting for the following:  Physical        6/24/2025     8:07 AM   Additional Questions   Roomed by Katharine Vital submitted by the patient for this visit:  Patient Health Questionnaire (Submitted on 6/24/2025)  If you checked off any problems, how difficult have these problems made it for you to do your work, take care of things at home, or get along with other people?: Not difficult at all  PHQ9 TOTAL SCORE: 0    - Paolo B Siddhartha, 20-year-old female  - Prescribed Zepbound, but insurance did not cover it; discussing options with endocrinologist  - Thyroid nodule biopsy performed recently; results consistent with Hashimoto's thyroiditis  - TSH levels checked in April, within normal range after taking levothyroxine  - Previously on Vyvanse for ADHD and appetite suppression; has been off it for a long time, interested in resuming  - Binge eating disorder noted, previously managed with Vyvanse 30 mg  - Works at a summer camp, active lifestyle, struggles with weight management despite healthy habits        Advance Care Planning    Discussed advance care planning with patient; informed AVS has link to [facility].        6/19/2025   General Health   How would you rate your overall physical health? " (!) FAIR   Feel stress (tense, anxious, or unable to sleep) Only a little   (!) STRESS CONCERN      6/19/2025   Nutrition   Three or more servings of calcium each day? (!) NO   Diet: Regular (no restrictions)   How many servings of fruit and vegetables per day? (!) 2-3   How many sweetened beverages each day? 0-1         6/19/2025   Exercise   Days per week of moderate/strenous exercise 5 days   Average minutes spent exercising at this level 60 min         6/19/2025   Social Factors   Frequency of gathering with friends or relatives Once a week   Worry food won't last until get money to buy more No   Food not last or not have enough money for food? No   Do you have housing? (Housing is defined as stable permanent housing and does not include staying outside in a car, in a tent, in an abandoned building, in an overnight shelter, or couch-surfing.) Yes   Are you worried about losing your housing? No   Lack of transportation? No   Unable to get utilities (heat,electricity)? No         6/19/2025   Dental   Dentist two times every year? Yes       Today's PHQ-9 Score:       6/24/2025     8:04 AM   PHQ-9 SCORE   PHQ-9 Total Score MyChart 0   PHQ-9 Total Score 0        Patient-reported         6/19/2025   Substance Use   Alcohol more than 3/day or more than 7/wk No   Do you use any other substances recreationally? (!) CANNABIS PRODUCTS     Social History     Tobacco Use    Smoking status: Never     Passive exposure: Never    Smokeless tobacco: Never   Vaping Use    Vaping status: Never Used   Substance Use Topics    Alcohol use: Never     Comment: very rare    Drug use: Never     Comment: gummies occasionally           6/19/2025   STI Screening   New sexual partner(s) since last STI/HIV test? No     History of abnormal Pap smear: No - age 21-29 PAP every 3 years recommended        6/24/2024     8:17 AM   PAP / HPV   PAP Negative for Intraepithelial Lesion or Malignancy (NILM)            6/19/2025   Contraception/Family  "Planning   Questions about contraception or family planning No   What are your periods like? Regular        Reviewed and updated as needed this visit by Provider                             Objective    Exam  /70 (BP Location: Right arm, Patient Position: Sitting)   Pulse 91   Temp 98.6  F (37  C) (Tympanic)   Resp 16   Ht 1.575 m (5' 2\")   Wt 114.9 kg (253 lb 3.2 oz)   LMP 06/13/2025   SpO2 96%   BMI 46.31 kg/m     Estimated body mass index is 46.31 kg/m  as calculated from the following:    Height as of this encounter: 1.575 m (5' 2\").    Weight as of this encounter: 114.9 kg (253 lb 3.2 oz).    Physical Exam        General: alert and oriented ×3 no acute distress.    HEENT: Normocephalic and atraumatic.   Eyes pupils are equal round and reactive to light     Hearing is grossly normal     Nares are patent there is no rhinorrhea.     Mucous membranes are moist and pink.    Chest: has bilateral rise with no increased work of breathing. clear to auscultation without wheezes, rhonchi, or rales.    Cardiovascular: normal perfusion and brisk capillary refill. S1S2 with regular rate and rhythm and no murmurs, gallops or rubs.    Musculoskeletal: no gross focal abnormalities and normal gait.    Neuro: no gross focal abnormalities and memory seems intact. CN 2-12 are grossly intact.    Psychiatric: speech is clear and coherent and fluent. Patient dressed appropriately for the weather. Mood is appropriate and affect is full.            Signed Electronically by: Tanya Barrios MD    "

## 2025-06-24 NOTE — PATIENT INSTRUCTIONS
Patient Education   Preventive Care Advice   This is general advice given by our system to help you stay healthy. However, your care team may have specific advice just for you. Please talk to your care team about your preventive care needs.  Nutrition  Eat 5 or more servings of fruits and vegetables each day.  Try wheat bread, brown rice and whole grain pasta (instead of white bread, rice, and pasta).  Get enough calcium and vitamin D. Check the label on foods and aim for 100% of the RDA (recommended daily allowance).  Lifestyle  Exercise at least 150 minutes each week  (30 minutes a day, 5 days a week).  Do muscle strengthening activities 2 days a week. These help control your weight and prevent disease.  No smoking.  Wear sunscreen to prevent skin cancer.  Have a dental exam and cleaning every 6 months.  Yearly exams  See your health care team every year to talk about:  Any changes in your health.  Any medicines your care team has prescribed.  Preventive care, family planning, and ways to prevent chronic diseases.  Shots (vaccines)   HPV shots (up to age 26), if you've never had them before.  Hepatitis B shots (up to age 59), if you've never had them before.  COVID-19 shot: Get this shot when it's due.  Flu shot: Get a flu shot every year.  Tetanus shot: Get a tetanus shot every 10 years.  Pneumococcal, hepatitis A, and RSV shots: Ask your care team if you need these based on your risk.  Shingles shot (for age 50 and up)  General health tests  Diabetes screening:  Starting at age 35, Get screened for diabetes at least every 3 years.  If you are younger than age 35, ask your care team if you should be screened for diabetes.  Cholesterol test: At age 39, start having a cholesterol test every 5 years, or more often if advised.  Bone density scan (DEXA): At age 50, ask your care team if you should have this scan for osteoporosis (brittle bones).  Hepatitis C: Get tested at least once in your life.  STIs (sexually  transmitted infections)  Before age 24: Ask your care team if you should be screened for STIs.  After age 24: Get screened for STIs if you're at risk. You are at risk for STIs (including HIV) if:  You are sexually active with more than one person.  You don't use condoms every time.  You or a partner was diagnosed with a sexually transmitted infection.  If you are at risk for HIV, ask about PrEP medicine to prevent HIV.  Get tested for HIV at least once in your life, whether you are at risk for HIV or not.  Cancer screening tests  Cervical cancer screening: If you have a cervix, begin getting regular cervical cancer screening tests starting at age 21.  Breast cancer scan (mammogram): If you've ever had breasts, begin having regular mammograms starting at age 40. This is a scan to check for breast cancer.  Colon cancer screening: It is important to start screening for colon cancer at age 45.  Have a colonoscopy test every 10 years (or more often if you're at risk) Or, ask your provider about stool tests like a FIT test every year or Cologuard test every 3 years.  To learn more about your testing options, visit:   .  For help making a decision, visit:   https://bit.ly/zj99739.  Prostate cancer screening test: If you have a prostate, ask your care team if a prostate cancer screening test (PSA) at age 55 is right for you.  Lung cancer screening: If you are a current or former smoker ages 50 to 80, ask your care team if ongoing lung cancer screenings are right for you.  For informational purposes only. Not to replace the advice of your health care provider. Copyright   2023 [facility]. All rights reserved. Clinically reviewed by the [facility]. I AM AT 237370 - REV 01/24.  Substance Use Disorder: Care Instructions  Overview     You can improve your life and health by stopping your use of alcohol or drugs. When you don't drink or use drugs, you may feel and sleep better. You may  get along better with your family, friends, and coworkers. There are medicines and programs that can help with substance use disorder.  How can you care for yourself at home?  Here are some ways to help you stay sober and prevent relapse.  If you have been given medicine to help keep you sober or reduce your cravings, be sure to take it exactly as prescribed.  Talk to your doctor about programs that can help you stop using drugs or drinking alcohol.  Do not keep alcohol or drugs in your home.  Plan ahead. Think about what you'll say if other people ask you to drink or use drugs. Try not to spend time with people who drink or use drugs.  Use the time and money spent on drinking or drugs to do something that's important to you.  Preventing a relapse  Have a plan to deal with relapse. Learn to recognize changes in your thinking that lead you to drink or use drugs. Get help before you start to drink or use drugs again.  Try to stay away from situations, friends, or places that may lead you to drink or use drugs.  If you feel the need to drink alcohol or use drugs again, seek help right away. Call a trusted friend or family member. Some people get support from organizations such as Narcotics Anonymous or [employer] or from treatment facilities.  If you relapse, get help as soon as you can. Some people make a plan with another person that outlines what they want that person to do for them if they relapse. The plan usually includes how to handle the relapse and who to notify in case of relapse.  Don't give up. Remember that a relapse doesn't mean that you have failed. Use the experience to learn the triggers that lead you to drink or use drugs. Then quit again. Recovery is a lifelong process. Many people have several relapses before they are able to quit for good.  Follow-up care is a key part of your treatment and safety. Be sure to make and go to all appointments, and call your doctor if you are having problems. It's  "also a good idea to know your test results and keep a list of the medicines you take.  When should you call for help?   Call 911  anytime you think you may need emergency care. For example, call if you or someone else:    Has overdosed or has withdrawal signs. Be sure to tell the emergency workers that you are or someone else is using or trying to quit using drugs. Overdose or withdrawal signs may include:  Losing consciousness.  Seizure.  Seeing or hearing things that aren't there (hallucinations).     Is thinking or talking about suicide or harming others.   Where to get help 24 hours a day, 7 days a week   If you or someone you know talks about suicide, self-harm, a mental health crisis, a substance use crisis, or any other kind of emotional distress, get help right away. You can:    Call the Suicide and Crisis Lifeline at 988.     Call 8-573-408-TALK (1-635.226.1226).     Text HOME to 906441 to access the Crisis Text Line.   Consider saving these numbers in your phone.  Go to [employer] for more information or to chat online.  Call your doctor now or seek immediate medical care if:    You are having withdrawal symptoms. These may include nausea or vomiting, sweating, shakiness, and anxiety.   Watch closely for changes in your health, and be sure to contact your doctor if:    You have a relapse.     You need more help or support to stop.   Where can you learn more?  Go to https://www.[employer].net/patiented  Enter H573 in the search box to learn more about \"Substance Use Disorder: Care Instructions.\"  Current as of: August 20, 2024  Content Version: 14.5 2024-2025 [employer].   Care instructions adapted under license by your healthcare professional. If you have questions about a medical condition or this instruction, always ask your healthcare professional. [employer] disclaims any warranty or liability for your use of this information.       "

## 2025-06-24 NOTE — LETTER
[facility] [place]  06/24/25  Patient: Mirna Carl  YOB: 2005  Medical Record Number: [id number]                                                                                  Non-Opioid Controlled Substance Agreement    This is an agreement between you and your provider regarding safe and appropriate use of controlled substances prescribed by your care team. Controlled substances are?medicines that can cause physical and mental dependence (abuse).     There are strict laws about having and using these medicines. We here at [facility] are  committed to working with you in your efforts to get better. To support you in this work, we'll help you schedule regular office appointments for medicine refills. If we must cancel or change your appointment for any reason, we'll make sure you have enough medicine to last until your next appointment.     As a Provider, I will:   Listen carefully to your concerns while treating you with respect.   Recommend a treatment plan that I believe is in your best interest and may involve therapies other than medicine.    Talk with you often about the possible benefits and the risk of harm of any medicine that we prescribe for you.  Assess the safety of this medicine and check how well it works.    Provide a plan on how to taper (discontinue or go off) using this medicine if the decision is made to stop its use.      ::  As a Patient, I understand controlled substances:     Are prescribed by my care provider to help me function or work and manage my condition(s).?  Are strong medicines and can cause serious side effects.     Need to be taken exactly as prescribed.?Combining controlled substances with certain medicines or chemicals (such as illegal drugs, alcohol, sedatives, sleeping pills, and benzodiazepines) can be dangerous or even fatal.? If I stop taking my medicines suddenly, I may have severe withdrawal symptoms.     The risks,  benefits, and side effects of these medicine(s) were explained to me. I agree that:    I will take part in other treatments as advised by my care team. This may be psychiatry or counseling, physical therapy, behavioral therapy, group treatment or a referral to specialist.    I will keep all my appointments and understand this is part of the monitoring of controlled substances.?My care team may require an office visit for EVERY controlled substance refill. If I miss appointments or don t follow instructions, my care team may stop my medicine    I will take my medicines as prescribed. I will not change the dose or schedule unless my care team tells me to. There will be no refills if I run out early.      I may be asked to come to the clinic and complete a urine drug test or complete a pill count. If I don t give a urine sample or participate in a pill count, the care team may stop my medicine.    I will only receive controlled substance prescriptions from this clinic. If I am treated by another provider, I will tell them that I am taking controlled substances and that I have a treatment agreement with this provider. I will inform my [facility] care team within one business day if I am given a prescription for any controlled substance by another healthcare provider. My [facility] care team can contact other providers and pharmacists about my use of any medicines.    It is up to me to make sure that I don't run out of my medicines on weekends or holidays.?If my care team is willing to refill my prescription without a visit, I must request refills only during office hours. Refills may take up to 3 business days to process. I will use one pharmacy to fill all my controlled substance prescriptions. I will notify the clinic about any changes to my insurance or medicine availability.    I am responsible for my prescriptions. If the medicine/prescription is lost, stolen or destroyed, it will not be replaced.?I  also agree not to share controlled substance medicines with anyone.     I am aware I should not use any illegal or recreational drugs. I agree not to drink alcohol unless my care team says I can.     If I enroll in the [place] [facility] Cann[facility] program, I will tell my care team before my next refill.    I will tell my care team right away if I become pregnant, have a new medical problem treated outside of my regular clinic, or have a change in my medicines.     I understand that this medicine can affect my thinking, judgment and reaction time.? Alcohol and drugs affect the brain and body, which can affect the safety of my driving. Being under the influence of alcohol or drugs can affect my decision-making, behaviors, personal safety and the safety of others. Driving while impaired (DWI) can occur if a person is driving, operating or in physical control of a car, motorcycle, boat, snowmobile, ATV, motorbike, off-road vehicle or any other motor vehicle ([place] Statute 169[place].20). I understand the risk if I choose to drive or operate any vehicle or machinery.    I understand that if I do not follow any of the conditions above, my prescriptions or treatment may be stopped or changed.   I agree that my provider, clinic care team and pharmacy may work with any city, state or federal law enforcement agency that investigates the misuse, sale or other diversion of my controlled medicine. I will allow my provider to discuss my care with, or share a copy of, this agreement with any other treating provider, pharmacy or emergency room where I receive care.     I have read this agreement and have asked questions about anything I did not understand.    ________________________________________________________  Patient Signature - Mirna Carl     ___________________                   Date     ________________________________________________________  Provider Signature - Tanya Barrios MD       ___________________                    Date     ________________________________________________________  Witness Signature (required if provider not present while patient signing)          ___________________                   Date

## 2025-06-28 LAB
1OH-MIDAZOLAM UR CFM-MCNC: <20 NG/ML
7AMINOCLONAZEPAM UR CFM-MCNC: <5 NG/ML
A-OH ALPRAZ UR CFM-MCNC: <5 NG/ML
ALPRAZ UR CFM-MCNC: <5 NG/ML
CHLORDIAZEP UR CFM-MCNC: <20 NG/ML
CLONAZEPAM UR CFM-MCNC: <5 NG/ML
DIAZEPAM UR CFM-MCNC: <20 NG/ML
LORAZEPAM UR CFM-MCNC: <20 NG/ML
MIDAZOLAM UR CFM-MCNC: <20 NG/ML
NORDIAZEPAM UR CFM-MCNC: <20 NG/ML
OXAZEPAM UR CFM-MCNC: <20 NG/ML
TEMAZEPAM UR CFM-MCNC: <20 NG/ML

## 2025-07-07 ENCOUNTER — MYC MEDICAL ADVICE (OUTPATIENT)
Dept: ENDOCRINOLOGY | Facility: CLINIC | Age: 20
End: 2025-07-07
Payer: COMMERCIAL

## 2025-07-07 DIAGNOSIS — E06.3 HASHIMOTO'S THYROIDITIS: Primary | ICD-10-CM

## 2025-08-25 ENCOUNTER — LAB (OUTPATIENT)
Dept: LAB | Facility: CLINIC | Age: 20
End: 2025-08-25
Payer: COMMERCIAL

## 2025-08-25 DIAGNOSIS — E06.3 HASHIMOTO'S THYROIDITIS: ICD-10-CM

## 2025-08-25 LAB — TSH SERPL DL<=0.005 MIU/L-ACNC: 2.62 UIU/ML (ref 0.3–4.2)

## 2025-08-25 PROCEDURE — 84443 ASSAY THYROID STIM HORMONE: CPT

## 2025-08-25 PROCEDURE — 36415 COLL VENOUS BLD VENIPUNCTURE: CPT

## 2025-08-27 ENCOUNTER — OFFICE VISIT (OUTPATIENT)
Dept: ENDOCRINOLOGY | Facility: CLINIC | Age: 20
End: 2025-08-27
Payer: COMMERCIAL

## 2025-08-27 ENCOUNTER — RESULTS FOLLOW-UP (OUTPATIENT)
Dept: ENDOCRINOLOGY | Facility: CLINIC | Age: 20
End: 2025-08-27

## 2025-08-27 VITALS
HEART RATE: 88 BPM | WEIGHT: 253 LBS | DIASTOLIC BLOOD PRESSURE: 68 MMHG | BODY MASS INDEX: 46.27 KG/M2 | SYSTOLIC BLOOD PRESSURE: 110 MMHG

## 2025-08-27 DIAGNOSIS — E03.8 SUBCLINICAL HYPOTHYROIDISM: ICD-10-CM

## 2025-08-27 DIAGNOSIS — E04.2 MULTIPLE THYROID NODULES: ICD-10-CM

## 2025-08-27 DIAGNOSIS — E06.3 HASHIMOTO'S THYROIDITIS: Primary | ICD-10-CM

## 2025-08-27 PROCEDURE — 3074F SYST BP LT 130 MM HG: CPT | Performed by: INTERNAL MEDICINE

## 2025-08-27 PROCEDURE — 99214 OFFICE O/P EST MOD 30 MIN: CPT | Performed by: INTERNAL MEDICINE

## 2025-08-27 PROCEDURE — 3078F DIAST BP <80 MM HG: CPT | Performed by: INTERNAL MEDICINE

## 2025-09-03 ENCOUNTER — PATIENT OUTREACH (OUTPATIENT)
Dept: CARE COORDINATION | Facility: CLINIC | Age: 20
End: 2025-09-03
Payer: COMMERCIAL